# Patient Record
Sex: FEMALE | Race: WHITE | HISPANIC OR LATINO | Employment: FULL TIME | URBAN - METROPOLITAN AREA
[De-identification: names, ages, dates, MRNs, and addresses within clinical notes are randomized per-mention and may not be internally consistent; named-entity substitution may affect disease eponyms.]

---

## 2017-03-04 ENCOUNTER — APPOINTMENT (OUTPATIENT)
Dept: CT IMAGING | Facility: HOSPITAL | Age: 32
End: 2017-03-04

## 2017-03-04 ENCOUNTER — HOSPITAL ENCOUNTER (INPATIENT)
Facility: HOSPITAL | Age: 32
LOS: 4 days | Discharge: HOME | End: 2017-03-08
Attending: EMERGENCY MEDICINE | Admitting: FAMILY MEDICINE

## 2017-03-04 ENCOUNTER — APPOINTMENT (OUTPATIENT)
Dept: ULTRASOUND IMAGING | Facility: HOSPITAL | Age: 32
End: 2017-03-04

## 2017-03-04 DIAGNOSIS — R11.2 NON-INTRACTABLE VOMITING WITH NAUSEA, UNSPECIFIED VOMITING TYPE: ICD-10-CM

## 2017-03-04 DIAGNOSIS — N12 PYELONEPHRITIS: Primary | ICD-10-CM

## 2017-03-04 PROBLEM — D72.825 BANDEMIA: Status: ACTIVE | Noted: 2017-03-04

## 2017-03-04 PROBLEM — R10.9 ACUTE FLANK PAIN: Status: ACTIVE | Noted: 2017-03-04

## 2017-03-04 LAB
ALBUMIN SERPL-MCNC: 4.1 G/DL (ref 3.2–4.8)
ALBUMIN/GLOB SERPL: 1.5 G/DL (ref 1.5–2.5)
ALP SERPL-CCNC: 69 U/L (ref 25–100)
ALT SERPL W P-5'-P-CCNC: 19 U/L (ref 7–40)
ANION GAP SERPL CALCULATED.3IONS-SCNC: 5 MMOL/L (ref 3–11)
AST SERPL-CCNC: 15 U/L (ref 0–33)
B-HCG UR QL: NEGATIVE
BACTERIA UR QL AUTO: ABNORMAL /HPF
BASOPHILS # BLD AUTO: 0.01 10*3/MM3 (ref 0–0.2)
BASOPHILS NFR BLD AUTO: 0.1 % (ref 0–1)
BILIRUB SERPL-MCNC: 0.8 MG/DL (ref 0.3–1.2)
BILIRUB UR QL STRIP: ABNORMAL
BUN BLD-MCNC: 9 MG/DL (ref 9–23)
BUN/CREAT SERPL: 15 (ref 7–25)
CALCIUM SPEC-SCNC: 8.9 MG/DL (ref 8.7–10.4)
CHLORIDE SERPL-SCNC: 104 MMOL/L (ref 99–109)
CLARITY UR: CLEAR
CO2 SERPL-SCNC: 26 MMOL/L (ref 20–31)
COLOR UR: YELLOW
CREAT BLD-MCNC: 0.6 MG/DL (ref 0.6–1.3)
DEPRECATED RDW RBC AUTO: 42.3 FL (ref 37–54)
EOSINOPHIL # BLD AUTO: 0.03 10*3/MM3 (ref 0.1–0.3)
EOSINOPHIL NFR BLD AUTO: 0.3 % (ref 0–3)
ERYTHROCYTE [DISTWIDTH] IN BLOOD BY AUTOMATED COUNT: 12.5 % (ref 11.3–14.5)
GFR SERPL CREATININE-BSD FRML MDRD: 117 ML/MIN/1.73
GLOBULIN UR ELPH-MCNC: 2.7 GM/DL
GLUCOSE BLD-MCNC: 120 MG/DL (ref 70–100)
GLUCOSE UR STRIP-MCNC: NEGATIVE MG/DL
HCT VFR BLD AUTO: 36.5 % (ref 34.5–44)
HGB BLD-MCNC: 12.3 G/DL (ref 11.5–15.5)
HGB UR QL STRIP.AUTO: ABNORMAL
HOLD SPECIMEN: NORMAL
HOLD SPECIMEN: NORMAL
HYALINE CASTS UR QL AUTO: ABNORMAL /LPF
IMM GRANULOCYTES # BLD: 0.02 10*3/MM3 (ref 0–0.03)
IMM GRANULOCYTES NFR BLD: 0.2 % (ref 0–0.6)
INTERNAL NEGATIVE CONTROL: NORMAL
INTERNAL POSITIVE CONTROL: POSITIVE
KETONES UR QL STRIP: ABNORMAL
LEUKOCYTE ESTERASE UR QL STRIP.AUTO: NEGATIVE
LIPASE SERPL-CCNC: 22 U/L (ref 6–51)
LYMPHOCYTES # BLD AUTO: 1.9 10*3/MM3 (ref 0.6–4.8)
LYMPHOCYTES NFR BLD AUTO: 17.6 % (ref 24–44)
Lab: NORMAL
MCH RBC QN AUTO: 31.1 PG (ref 27–31)
MCHC RBC AUTO-ENTMCNC: 33.7 G/DL (ref 32–36)
MCV RBC AUTO: 92.2 FL (ref 80–99)
MONOCYTES # BLD AUTO: 0.64 10*3/MM3 (ref 0–1)
MONOCYTES NFR BLD AUTO: 5.9 % (ref 0–12)
NEUTROPHILS # BLD AUTO: 8.22 10*3/MM3 (ref 1.5–8.3)
NEUTROPHILS NFR BLD AUTO: 75.9 % (ref 41–71)
NITRITE UR QL STRIP: NEGATIVE
PH UR STRIP.AUTO: 6.5 [PH] (ref 5–8)
PLATELET # BLD AUTO: 171 10*3/MM3 (ref 150–450)
PMV BLD AUTO: 10.9 FL (ref 6–12)
POTASSIUM BLD-SCNC: 3.4 MMOL/L (ref 3.5–5.5)
PROT SERPL-MCNC: 6.8 G/DL (ref 5.7–8.2)
PROT UR QL STRIP: ABNORMAL
RBC # BLD AUTO: 3.96 10*6/MM3 (ref 3.89–5.14)
RBC # UR: ABNORMAL /HPF
REF LAB TEST METHOD: ABNORMAL
SODIUM BLD-SCNC: 135 MMOL/L (ref 132–146)
SP GR UR STRIP: 1.02 (ref 1–1.03)
SQUAMOUS #/AREA URNS HPF: ABNORMAL /HPF
UROBILINOGEN UR QL STRIP: ABNORMAL
WBC NRBC COR # BLD: 10.82 10*3/MM3 (ref 3.5–10.8)
WBC UR QL AUTO: ABNORMAL /HPF
WHOLE BLOOD HOLD SPECIMEN: NORMAL
WHOLE BLOOD HOLD SPECIMEN: NORMAL

## 2017-03-04 PROCEDURE — 83690 ASSAY OF LIPASE: CPT | Performed by: EMERGENCY MEDICINE

## 2017-03-04 PROCEDURE — 87077 CULTURE AEROBIC IDENTIFY: CPT | Performed by: EMERGENCY MEDICINE

## 2017-03-04 PROCEDURE — 87086 URINE CULTURE/COLONY COUNT: CPT | Performed by: EMERGENCY MEDICINE

## 2017-03-04 PROCEDURE — 81001 URINALYSIS AUTO W/SCOPE: CPT | Performed by: EMERGENCY MEDICINE

## 2017-03-04 PROCEDURE — 87186 SC STD MICRODIL/AGAR DIL: CPT | Performed by: EMERGENCY MEDICINE

## 2017-03-04 PROCEDURE — G0378 HOSPITAL OBSERVATION PER HR: HCPCS

## 2017-03-04 PROCEDURE — 76705 ECHO EXAM OF ABDOMEN: CPT

## 2017-03-04 PROCEDURE — 80053 COMPREHEN METABOLIC PANEL: CPT | Performed by: EMERGENCY MEDICINE

## 2017-03-04 PROCEDURE — 25010000003 CEFTRIAXONE PER 250 MG: Performed by: PHYSICIAN ASSISTANT

## 2017-03-04 PROCEDURE — 96375 TX/PRO/DX INJ NEW DRUG ADDON: CPT

## 2017-03-04 PROCEDURE — 99285 EMERGENCY DEPT VISIT HI MDM: CPT

## 2017-03-04 PROCEDURE — 96376 TX/PRO/DX INJ SAME DRUG ADON: CPT

## 2017-03-04 PROCEDURE — 96365 THER/PROPH/DIAG IV INF INIT: CPT

## 2017-03-04 PROCEDURE — 85025 COMPLETE CBC W/AUTO DIFF WBC: CPT | Performed by: EMERGENCY MEDICINE

## 2017-03-04 PROCEDURE — 96361 HYDRATE IV INFUSION ADD-ON: CPT

## 2017-03-04 PROCEDURE — 25010000002 ONDANSETRON PER 1 MG: Performed by: EMERGENCY MEDICINE

## 2017-03-04 PROCEDURE — 25010000002 HYDROMORPHONE PER 4 MG: Performed by: EMERGENCY MEDICINE

## 2017-03-04 PROCEDURE — 99223 1ST HOSP IP/OBS HIGH 75: CPT | Performed by: FAMILY MEDICINE

## 2017-03-04 PROCEDURE — 25010000002 MORPHINE PER 10 MG: Performed by: EMERGENCY MEDICINE

## 2017-03-04 PROCEDURE — 74176 CT ABD & PELVIS W/O CONTRAST: CPT

## 2017-03-04 PROCEDURE — 25010000002 KETOROLAC TROMETHAMINE PER 15 MG: Performed by: EMERGENCY MEDICINE

## 2017-03-04 RX ORDER — ACETAMINOPHEN 325 MG/1
650 TABLET ORAL EVERY 4 HOURS PRN
Status: DISCONTINUED | OUTPATIENT
Start: 2017-03-04 | End: 2017-03-08 | Stop reason: HOSPADM

## 2017-03-04 RX ORDER — SODIUM CHLORIDE AND POTASSIUM CHLORIDE 150; 900 MG/100ML; MG/100ML
125 INJECTION, SOLUTION INTRAVENOUS CONTINUOUS
Status: DISCONTINUED | OUTPATIENT
Start: 2017-03-05 | End: 2017-03-08 | Stop reason: HOSPADM

## 2017-03-04 RX ORDER — LORAZEPAM 2 MG/ML
0.5 INJECTION INTRAMUSCULAR EVERY 6 HOURS PRN
Status: DISCONTINUED | OUTPATIENT
Start: 2017-03-04 | End: 2017-03-08 | Stop reason: HOSPADM

## 2017-03-04 RX ORDER — SODIUM CHLORIDE 0.9 % (FLUSH) 0.9 %
10 SYRINGE (ML) INJECTION AS NEEDED
Status: DISCONTINUED | OUTPATIENT
Start: 2017-03-04 | End: 2017-03-08 | Stop reason: HOSPADM

## 2017-03-04 RX ORDER — ONDANSETRON 4 MG/1
4 TABLET, FILM COATED ORAL EVERY 8 HOURS PRN
COMMUNITY
End: 2017-05-25

## 2017-03-04 RX ORDER — HYDROMORPHONE HYDROCHLORIDE 1 MG/ML
0.5 INJECTION, SOLUTION INTRAMUSCULAR; INTRAVENOUS; SUBCUTANEOUS
Status: DISCONTINUED | OUTPATIENT
Start: 2017-03-04 | End: 2017-03-05

## 2017-03-04 RX ORDER — HYDROCODONE BITARTRATE AND ACETAMINOPHEN 5; 325 MG/1; MG/1
1 TABLET ORAL EVERY 6 HOURS PRN
Status: ON HOLD | COMMUNITY
End: 2017-03-08

## 2017-03-04 RX ORDER — NALOXONE HCL 0.4 MG/ML
0.4 VIAL (ML) INJECTION
Status: DISCONTINUED | OUTPATIENT
Start: 2017-03-04 | End: 2017-03-08 | Stop reason: HOSPADM

## 2017-03-04 RX ORDER — KETOROLAC TROMETHAMINE 30 MG/ML
30 INJECTION, SOLUTION INTRAMUSCULAR; INTRAVENOUS ONCE
Status: COMPLETED | OUTPATIENT
Start: 2017-03-04 | End: 2017-03-04

## 2017-03-04 RX ORDER — CEFTRIAXONE SODIUM 1 G/50ML
1 INJECTION, SOLUTION INTRAVENOUS EVERY 24 HOURS
Status: DISCONTINUED | OUTPATIENT
Start: 2017-03-05 | End: 2017-03-07

## 2017-03-04 RX ORDER — MORPHINE SULFATE 4 MG/ML
4 INJECTION, SOLUTION INTRAMUSCULAR; INTRAVENOUS ONCE
Status: COMPLETED | OUTPATIENT
Start: 2017-03-04 | End: 2017-03-04

## 2017-03-04 RX ORDER — CEFTRIAXONE SODIUM 1 G/50ML
1 INJECTION, SOLUTION INTRAVENOUS ONCE
Status: COMPLETED | OUTPATIENT
Start: 2017-03-04 | End: 2017-03-04

## 2017-03-04 RX ORDER — SODIUM CHLORIDE 0.9 % (FLUSH) 0.9 %
1-10 SYRINGE (ML) INJECTION AS NEEDED
Status: DISCONTINUED | OUTPATIENT
Start: 2017-03-04 | End: 2017-03-08 | Stop reason: HOSPADM

## 2017-03-04 RX ORDER — ONDANSETRON 2 MG/ML
4 INJECTION INTRAMUSCULAR; INTRAVENOUS EVERY 6 HOURS PRN
Status: DISCONTINUED | OUTPATIENT
Start: 2017-03-04 | End: 2017-03-08 | Stop reason: HOSPADM

## 2017-03-04 RX ORDER — PANTOPRAZOLE SODIUM 40 MG/10ML
40 INJECTION, POWDER, LYOPHILIZED, FOR SOLUTION INTRAVENOUS ONCE
Status: COMPLETED | OUTPATIENT
Start: 2017-03-04 | End: 2017-03-04

## 2017-03-04 RX ORDER — IBUPROFEN 400 MG/1
800 TABLET ORAL ONCE
Status: COMPLETED | OUTPATIENT
Start: 2017-03-04 | End: 2017-03-04

## 2017-03-04 RX ORDER — ONDANSETRON 2 MG/ML
4 INJECTION INTRAMUSCULAR; INTRAVENOUS ONCE
Status: COMPLETED | OUTPATIENT
Start: 2017-03-04 | End: 2017-03-04

## 2017-03-04 RX ORDER — HYDROCODONE BITARTRATE AND ACETAMINOPHEN 5; 325 MG/1; MG/1
1 TABLET ORAL EVERY 4 HOURS PRN
Status: DISCONTINUED | OUTPATIENT
Start: 2017-03-04 | End: 2017-03-07

## 2017-03-04 RX ADMIN — IBUPROFEN 800 MG: 400 TABLET ORAL at 21:19

## 2017-03-04 RX ADMIN — PANTOPRAZOLE SODIUM 40 MG: 40 INJECTION, POWDER, FOR SOLUTION INTRAVENOUS at 16:26

## 2017-03-04 RX ADMIN — MORPHINE SULFATE 4 MG: 4 INJECTION, SOLUTION INTRAMUSCULAR; INTRAVENOUS at 16:30

## 2017-03-04 RX ADMIN — SODIUM CHLORIDE 1000 ML: 9 INJECTION, SOLUTION INTRAVENOUS at 18:30

## 2017-03-04 RX ADMIN — SODIUM CHLORIDE 1000 ML: 9 INJECTION, SOLUTION INTRAVENOUS at 16:25

## 2017-03-04 RX ADMIN — KETOROLAC TROMETHAMINE 30 MG: 30 INJECTION, SOLUTION INTRAMUSCULAR at 20:46

## 2017-03-04 RX ADMIN — CEFTRIAXONE SODIUM 1 G: 1 INJECTION, SOLUTION INTRAVENOUS at 17:50

## 2017-03-04 RX ADMIN — MORPHINE SULFATE 4 MG: 4 INJECTION, SOLUTION INTRAMUSCULAR; INTRAVENOUS at 20:44

## 2017-03-04 RX ADMIN — HYDROMORPHONE HYDROCHLORIDE 1 MG: 1 INJECTION, SOLUTION INTRAMUSCULAR; INTRAVENOUS; SUBCUTANEOUS at 18:26

## 2017-03-04 RX ADMIN — ONDANSETRON 4 MG: 2 INJECTION INTRAMUSCULAR; INTRAVENOUS at 16:28

## 2017-03-05 PROBLEM — E87.6 HYPOKALEMIA: Status: ACTIVE | Noted: 2017-03-05

## 2017-03-05 PROBLEM — D72.825 BANDEMIA: Status: RESOLVED | Noted: 2017-03-04 | Resolved: 2017-03-05

## 2017-03-05 PROBLEM — N13.30 HYDRONEPHROSIS, LEFT: Status: ACTIVE | Noted: 2017-03-05

## 2017-03-05 LAB
ANION GAP SERPL CALCULATED.3IONS-SCNC: 2 MMOL/L (ref 3–11)
BASOPHILS # BLD AUTO: 0 10*3/MM3 (ref 0–0.2)
BASOPHILS NFR BLD AUTO: 0 % (ref 0–1)
BUN BLD-MCNC: 7 MG/DL (ref 9–23)
BUN/CREAT SERPL: 14 (ref 7–25)
CALCIUM SPEC-SCNC: 7.9 MG/DL (ref 8.7–10.4)
CHLORIDE SERPL-SCNC: 110 MMOL/L (ref 99–109)
CO2 SERPL-SCNC: 27 MMOL/L (ref 20–31)
CREAT BLD-MCNC: 0.5 MG/DL (ref 0.6–1.3)
DEPRECATED RDW RBC AUTO: 45.3 FL (ref 37–54)
EOSINOPHIL # BLD AUTO: 0.1 10*3/MM3 (ref 0.1–0.3)
EOSINOPHIL NFR BLD AUTO: 1.4 % (ref 0–3)
ERYTHROCYTE [DISTWIDTH] IN BLOOD BY AUTOMATED COUNT: 13 % (ref 11.3–14.5)
GFR SERPL CREATININE-BSD FRML MDRD: 145 ML/MIN/1.73
GLUCOSE BLD-MCNC: 85 MG/DL (ref 70–100)
HCT VFR BLD AUTO: 31.7 % (ref 34.5–44)
HGB BLD-MCNC: 10.3 G/DL (ref 11.5–15.5)
IMM GRANULOCYTES # BLD: 0.01 10*3/MM3 (ref 0–0.03)
IMM GRANULOCYTES NFR BLD: 0.1 % (ref 0–0.6)
LYMPHOCYTES # BLD AUTO: 2.63 10*3/MM3 (ref 0.6–4.8)
LYMPHOCYTES NFR BLD AUTO: 36.8 % (ref 24–44)
MCH RBC QN AUTO: 30.9 PG (ref 27–31)
MCHC RBC AUTO-ENTMCNC: 32.5 G/DL (ref 32–36)
MCV RBC AUTO: 95.2 FL (ref 80–99)
MONOCYTES # BLD AUTO: 0.66 10*3/MM3 (ref 0–1)
MONOCYTES NFR BLD AUTO: 9.2 % (ref 0–12)
NEUTROPHILS # BLD AUTO: 3.75 10*3/MM3 (ref 1.5–8.3)
NEUTROPHILS NFR BLD AUTO: 52.5 % (ref 41–71)
PLATELET # BLD AUTO: 160 10*3/MM3 (ref 150–450)
PMV BLD AUTO: 11.2 FL (ref 6–12)
POTASSIUM BLD-SCNC: 4 MMOL/L (ref 3.5–5.5)
RBC # BLD AUTO: 3.33 10*6/MM3 (ref 3.89–5.14)
SODIUM BLD-SCNC: 139 MMOL/L (ref 132–146)
WBC NRBC COR # BLD: 7.15 10*3/MM3 (ref 3.5–10.8)

## 2017-03-05 PROCEDURE — 80048 BASIC METABOLIC PNL TOTAL CA: CPT | Performed by: FAMILY MEDICINE

## 2017-03-05 PROCEDURE — 25010000002 ONDANSETRON PER 1 MG: Performed by: FAMILY MEDICINE

## 2017-03-05 PROCEDURE — 85025 COMPLETE CBC W/AUTO DIFF WBC: CPT | Performed by: FAMILY MEDICINE

## 2017-03-05 PROCEDURE — G0378 HOSPITAL OBSERVATION PER HR: HCPCS

## 2017-03-05 PROCEDURE — 25010000002 MORPHINE PER 10 MG: Performed by: INTERNAL MEDICINE

## 2017-03-05 PROCEDURE — 25010000003 CEFTRIAXONE PER 250 MG: Performed by: FAMILY MEDICINE

## 2017-03-05 PROCEDURE — 99233 SBSQ HOSP IP/OBS HIGH 50: CPT | Performed by: INTERNAL MEDICINE

## 2017-03-05 PROCEDURE — 25010000002 ENOXAPARIN PER 10 MG: Performed by: FAMILY MEDICINE

## 2017-03-05 PROCEDURE — 25810000003 SODIUM CHLORIDE 0.9 % WITH KCL 20 MEQ 20-0.9 MEQ/L-% SOLUTION: Performed by: FAMILY MEDICINE

## 2017-03-05 PROCEDURE — 25010000002 HYDROMORPHONE PER 4 MG: Performed by: FAMILY MEDICINE

## 2017-03-05 PROCEDURE — 25010000002 KETOROLAC TROMETHAMINE PER 15 MG: Performed by: INTERNAL MEDICINE

## 2017-03-05 RX ORDER — KETOROLAC TROMETHAMINE 30 MG/ML
30 INJECTION, SOLUTION INTRAMUSCULAR; INTRAVENOUS EVERY 6 HOURS PRN
Status: DISPENSED | OUTPATIENT
Start: 2017-03-05 | End: 2017-03-06

## 2017-03-05 RX ORDER — MORPHINE SULFATE 4 MG/ML
4 INJECTION, SOLUTION INTRAMUSCULAR; INTRAVENOUS EVERY 4 HOURS PRN
Status: DISCONTINUED | OUTPATIENT
Start: 2017-03-05 | End: 2017-03-07

## 2017-03-05 RX ADMIN — HYDROMORPHONE HYDROCHLORIDE 0.5 MG: 1 INJECTION, SOLUTION INTRAMUSCULAR; INTRAVENOUS; SUBCUTANEOUS at 05:34

## 2017-03-05 RX ADMIN — CEFTRIAXONE SODIUM 1 G: 1 INJECTION, SOLUTION INTRAVENOUS at 17:11

## 2017-03-05 RX ADMIN — MORPHINE SULFATE 4 MG: 4 INJECTION, SOLUTION INTRAMUSCULAR; INTRAVENOUS at 13:07

## 2017-03-05 RX ADMIN — HYDROCODONE BITARTRATE AND ACETAMINOPHEN 1 TABLET: 5; 325 TABLET ORAL at 12:08

## 2017-03-05 RX ADMIN — ACETAMINOPHEN 650 MG: 325 TABLET, FILM COATED ORAL at 00:32

## 2017-03-05 RX ADMIN — KETOROLAC TROMETHAMINE 30 MG: 30 INJECTION, SOLUTION INTRAMUSCULAR at 23:21

## 2017-03-05 RX ADMIN — MORPHINE SULFATE 4 MG: 4 INJECTION, SOLUTION INTRAMUSCULAR; INTRAVENOUS at 21:57

## 2017-03-05 RX ADMIN — KETOROLAC TROMETHAMINE 30 MG: 30 INJECTION, SOLUTION INTRAMUSCULAR at 10:02

## 2017-03-05 RX ADMIN — MORPHINE SULFATE 4 MG: 4 INJECTION, SOLUTION INTRAMUSCULAR; INTRAVENOUS at 09:00

## 2017-03-05 RX ADMIN — ONDANSETRON 4 MG: 2 INJECTION INTRAMUSCULAR; INTRAVENOUS at 08:56

## 2017-03-05 RX ADMIN — MORPHINE SULFATE 4 MG: 4 INJECTION, SOLUTION INTRAMUSCULAR; INTRAVENOUS at 17:11

## 2017-03-05 RX ADMIN — POTASSIUM CHLORIDE AND SODIUM CHLORIDE 125 ML/HR: 900; 150 INJECTION, SOLUTION INTRAVENOUS at 00:41

## 2017-03-05 RX ADMIN — ENOXAPARIN SODIUM 40 MG: 40 INJECTION SUBCUTANEOUS at 08:57

## 2017-03-05 RX ADMIN — HYDROCODONE BITARTRATE AND ACETAMINOPHEN 1 TABLET: 5; 325 TABLET ORAL at 16:05

## 2017-03-05 RX ADMIN — POTASSIUM CHLORIDE AND SODIUM CHLORIDE 125 ML/HR: 900; 150 INJECTION, SOLUTION INTRAVENOUS at 09:01

## 2017-03-05 RX ADMIN — KETOROLAC TROMETHAMINE 30 MG: 30 INJECTION, SOLUTION INTRAMUSCULAR at 16:01

## 2017-03-05 RX ADMIN — ACETAMINOPHEN 650 MG: 325 TABLET, FILM COATED ORAL at 21:57

## 2017-03-05 RX ADMIN — HYDROCODONE BITARTRATE AND ACETAMINOPHEN 1 TABLET: 5; 325 TABLET ORAL at 20:24

## 2017-03-05 RX ADMIN — HYDROMORPHONE HYDROCHLORIDE 0.5 MG: 1 INJECTION, SOLUTION INTRAMUSCULAR; INTRAVENOUS; SUBCUTANEOUS at 00:32

## 2017-03-05 NOTE — PLAN OF CARE
Problem: Patient Care Overview (Adult)  Goal: Plan of Care Review  Outcome: Ongoing (interventions implemented as appropriate)    03/05/17 0429   Coping/Psychosocial Response Interventions   Plan Of Care Reviewed With patient   Patient Care Overview   Progress no change   Outcome Evaluation   Outcome Summary/Follow up Plan pt admitted to unit, vss, pain controlled       Goal: Adult Individualization and Mutuality  Outcome: Ongoing (interventions implemented as appropriate)  Goal: Discharge Needs Assessment  Outcome: Ongoing (interventions implemented as appropriate)    Problem: Pain, Acute (Adult)  Goal: Identify Related Risk Factors and Signs and Symptoms  Outcome: Ongoing (interventions implemented as appropriate)  Goal: Acceptable Pain Control/Comfort Level  Outcome: Ongoing (interventions implemented as appropriate)

## 2017-03-05 NOTE — H&P
"    [facility] Medicine Services  HISTORY AND PHYSICAL    Primary Care Physician: Paolo Bhandari MD    Subjective     Chief Complaint:  Fever and Flank Pain    History of Present Illness:  This is a pleasant 31 year old  female that is accompanied by her  to [facility] ED for fever and flank pain.  She describes a visit at [facility] ED yesterday for similar symptoms.  She reports a few days of lower abdominal pain that radiates to her flank with associated dysuria, frequency and urgency.  She describes associated fever, chills, nausea and vomiting.  She has not identified gross hematuria, pelvic pain, vaginal discharge or rashes.  She describes a history of problems with \"UTI's.\"    Review of Systems   Constitutional: Positive for activity change, chills, fatigue and fever. Negative for diaphoresis.   HENT: Negative.  Negative for nosebleeds and trouble swallowing.    Eyes: Negative.  Negative for discharge and visual disturbance.   Respiratory: Negative.  Negative for shortness of breath.    Cardiovascular: Negative.  Negative for chest pain, palpitations and leg swelling.   Gastrointestinal: Positive for abdominal pain, nausea and vomiting. Negative for blood in stool, constipation and diarrhea.   Endocrine: Negative.  Negative for polydipsia, polyphagia and polyuria.   Genitourinary: Positive for difficulty urinating, dysuria, flank pain, frequency and urgency. Negative for hematuria, pelvic pain and vaginal discharge.   Musculoskeletal: Positive for myalgias.   Skin: Negative.  Negative for rash.   Allergic/Immunologic: Negative.    Neurological: Negative.  Negative for syncope.   Hematological: Negative.  Does not bruise/bleed easily.   Psychiatric/Behavioral: Negative.    Otherwise complete ROS performed and negative except as mentioned in the HPI.    Past Medical History   Diagnosis Date   • Kidney stones    • Obesity      Past Surgical History   Procedure Laterality Date   • " "Appendectomy     • Tubal abdominal ligation     • Tonsillectomy       Family History   Problem Relation Age of Onset   • No Known Problems Mother    • Hypertension Father      Social History   • Marital status:      Occupational History   •  facility      Social History Main Topics   • Smoking status: Former Smoker     Years: 10.00     Types: Cigarettes     Quit date: 2016   • Smokeless tobacco: Never Used   • Alcohol use No   • Drug use: No   • Sexual activity: Yes     Partners: Male     Birth control/ protection: Surgical      Comment:      Medications:  Prescriptions Prior to Admission   Medication Sig Dispense Refill Last Dose   • HYDROcodone-acetaminophen (NORCO) 5-325 MG per tablet Take 1 tablet by mouth Every 6 (Six) Hours As Needed.      • ondansetron (ZOFRAN) 4 MG tablet Take 4 mg by mouth Every 8 (Eight) Hours As Needed for nausea or vomiting.      • amoxicillin-clavulanate (AUGMENTIN) 875-125 MG per tablet Take 1 tablet by mouth 2 (two) times a day. 14 tablet 0    • Chlorcyclizine-Pseudoephed 25-60 MG tablet 1/2-1 po q 8 hours PRN 20 tablet 0    • predniSONE (DELTASONE) 20 MG tablet Take 2 tablets by mouth daily. 10 tablet 0        Allergies:  No Known Allergies    Objective     Physical Exam:  Vital Signs:   Visit Vitals   • /72 (BP Location: Right arm, Patient Position: Lying)   • Pulse 89   • Temp 98.9 °F (37.2 °C) (Oral)   • Resp 18   • Ht 64\" (162.6 cm)   • Wt 200 lb (90.7 kg)   • LMP 03/01/2017   • SpO2 97%   • Breastfeeding No   • BMI 34.33 kg/m2     Physical Exam   Constitutional: She is oriented to person, place, and time. She appears well-developed and well-nourished. She is cooperative.   HENT:   Head: Normocephalic and atraumatic.   Right Ear: Hearing and external ear normal.   Left Ear: Hearing and external ear normal.   Nose: Nose normal.   Mouth/Throat: Uvula is midline, oropharynx is clear and moist and mucous membranes are normal.   Eyes: Conjunctivae and EOM " are normal. Pupils are equal, round, and reactive to light. No scleral icterus.   Neck: Trachea normal and normal range of motion. Neck supple. No JVD present. Carotid bruit is not present. No thyromegaly present.   Cardiovascular: Normal rate, regular rhythm, normal heart sounds and intact distal pulses.    Pulmonary/Chest: Effort normal and breath sounds normal.   Abdominal: Soft. Bowel sounds are normal. There is no hepatosplenomegaly. There is tenderness in the periumbilical area. There is CVA tenderness. There is no rebound and no guarding.   Musculoskeletal: Normal range of motion.   Lymphadenopathy:     She has no cervical adenopathy.   Neurological: She is alert and oriented to person, place, and time. She has normal strength and normal reflexes. No sensory deficit.   Skin: Skin is warm and dry.   Psychiatric: She has a normal mood and affect. Her speech is normal and behavior is normal. Judgment and thought content normal. Cognition and memory are normal.   Nursing note and vitals reviewed.    Results Reviewed:    Results from last 7 days  Lab Units 03/04/17  1524   WBC 10*3/mm3 10.82*   HEMOGLOBIN g/dL 12.3   PLATELETS 10*3/mm3 171       Results from last 7 days  Lab Units 03/04/17  1524   SODIUM mmol/L 135   POTASSIUM mmol/L 3.4*   TOTAL CO2 mmol/L 26.0   CREATININE mg/dL 0.60   GLUCOSE mg/dL 120*   CALCIUM mg/dL 8.9     I have personally reviewed and interpreted available lab data, radiology studies and ECG obtained at time of admission.     Assessment / Plan     Assessment/Problem List:   Principal Problem:    Pyelonephritis  Active Problems:    Acute flank pain    Bandemia    Kidney stones    Plan:  We will admit as observation and continue with IVF's.  Urine cultures have been sent.  We will continue with Rocephin and await culture results.  We will continue with anti-emetics, pain relief and other comfort measures.  The plan has been discussed with the patient and her  who is at the  bedside.    DVT prophylaxis: Lovenox  Code Status: Full  Admission Status: Patient will be admitted to [facility].     Mann Emerson MD 03/04/17 11:19 PM

## 2017-03-05 NOTE — PLAN OF CARE
Problem: Patient Care Overview (Adult)  Goal: Plan of Care Review  Outcome: Ongoing (interventions implemented as appropriate)  Goal: Adult Individualization and Mutuality  Outcome: Ongoing (interventions implemented as appropriate)  Goal: Discharge Needs Assessment  Outcome: Ongoing (interventions implemented as appropriate)    Problem: Pain, Acute (Adult)  Goal: Identify Related Risk Factors and Signs and Symptoms  Outcome: Ongoing (interventions implemented as appropriate)  Goal: Acceptable Pain Control/Comfort Level  Outcome: Ongoing (interventions implemented as appropriate)    Problem: Infection, Risk/Actual (Adult)  Goal: Identify Related Risk Factors and Signs and Symptoms  Outcome: Ongoing (interventions implemented as appropriate)  Goal: Infection Prevention/Resolution  Outcome: Ongoing (interventions implemented as appropriate)

## 2017-03-05 NOTE — PROGRESS NOTES
"    Psychiatric Medicine Services  INPATIENT PROGRESS NOTE    Date of Admission: 3/4/2017  Length of Stay: 0  Primary Care Physician: Paolo Bhandari MD    Subjective   CC: right flank pain    HPI:  Continues to have significant right flank pain.  Wants to change to morphine from dilaudid as that seems to be making her HA worse.  Per , did have temp 101.8 yesterday morning.  Had not yet taken cipro prescribed on Friday.  + \"strong smelling urine and dark\".  Reported negative flu swab as well.    Review Of Systems:   Review of Systems   Constitutional: Positive for fever.   Respiratory: Negative for cough.    Cardiovascular: Negative for chest pain.   Gastrointestinal: Positive for abdominal pain.   Genitourinary: Positive for dysuria.   Neurological: Positive for headaches.   All other systems reviewed and are negative.        Objective      Temp:  [97.3 °F (36.3 °C)-100.1 °F (37.8 °C)] 97.7 °F (36.5 °C)  Heart Rate:  [] 65  Resp:  [16-18] 18  BP: ()/(52-72) 103/59  Physical Exam   Constitutional: She is oriented to person, place, and time. She appears well-developed and well-nourished.   Lying in bed with wet washcloth on forehead   Eyes: Pupils are equal, round, and reactive to light.   Cardiovascular: Normal rate, regular rhythm and normal heart sounds.    No murmur heard.  Pulmonary/Chest: Effort normal and breath sounds normal. No respiratory distress.   Abdominal:   Mild right sided abd pain.  Moderate right CVA tenderness   Musculoskeletal: She exhibits no edema.   Neurological: She is alert and oriented to person, place, and time.   Skin: Skin is warm and dry. No rash noted.   Psychiatric: She has a normal mood and affect.   Vitals reviewed.      Results Review:    I have reviewed the labs, radiology results and diagnostic studies.      Results from last 7 days  Lab Units 03/05/17  0447   WBC 10*3/mm3 7.15   HEMOGLOBIN g/dL 10.3*   PLATELETS 10*3/mm3 160       Results " from last 7 days  Lab Units 03/05/17  0447   SODIUM mmol/L 139   POTASSIUM mmol/L 4.0   CHLORIDE mmol/L 110*   TOTAL CO2 mmol/L 27.0   BUN mg/dL 7*   CREATININE mg/dL 0.50*   GLUCOSE mg/dL 85   CALCIUM mg/dL 7.9*       Culture Data:     URINE CULTURE   Date Value Ref Range Status   03/04/2017 Culture in progress  Preliminary       Radiology Data:   Abd CT - mild left hydro and perinephric stranding    I have reviewed the medications.    Assessment/Plan     Problem List  Principal Problem:    Pyelonephritis  Active Problems:    Acute flank pain    Kidney stones    Hydronephrosis, left    Hypokalemia    Assessment/Plan:  - has persistent right flank pain.  Interestingly, abnormalities on the CT are on the left side.  No evidence of stone or obstruction on right.  - Will continue rocephin, await urine culture results.  - will change narcs to morphine and also continue toradol which she feels is hoping the most.  I reviewed [facility] and she has no controlled substances prescribed in last year.  - flu negative at OSH  - continue IVF, K+ better  - home TBD based on symptoms, could be tomorrow is she is improved.    High complexity.    Wander Aguilar MD   03/05/17   10:03 AM    Please note that portions of this note may have been completed with a voice recognition program. Efforts were made to edit the dictations, but occasionally words are mistranscribed.

## 2017-03-05 NOTE — ED PROVIDER NOTES
Subjective   Patient is a 31 y.o. female presenting with abdominal pain.   History provided by:  Patient and spouse  Abdominal Pain   Pain location:  R flank, RUQ and RLQ  Pain quality: pressure and shooting    Progression:  Worsening (Initially began as lower back pain on Thursday. Pt thought UTI and dx at [facility] but didn't  Rx. Pt then seen at [facility] ED yesterday due to worsening pain going into abdomen. )  Context: not alcohol use, not diet changes, not recent travel, not sick contacts and not suspicious food intake    Context comment:  Pt states was told at [facility] labs, urine and CT were normal. She states they told her she didn't need abx and needed to f/u with her PCP to check her gallbladder.    Exacerbated by: Pain in abdomen and right lower back worse with movement, especially right leg.   Associated symptoms: chills, dysuria, fatigue, fever (101), nausea and vomiting    Associated symptoms: no chest pain, no constipation, no cough, no diarrhea, no hematuria, no shortness of breath, no sore throat and no vaginal bleeding    Risk factors: no alcohol abuse and no NSAID use    No numbness, tingling or weakness to LE. Pt states occasionally left hip with go numb if she lays on it for too long but none currently. No bowel or bladder dysfunction.       Review of Systems   Constitutional: Positive for chills, fatigue and fever (101).   HENT: Negative for congestion, ear pain, sore throat and trouble swallowing.    Eyes: Negative for pain, redness and visual disturbance.   Respiratory: Negative for cough, chest tightness and shortness of breath.    Cardiovascular: Negative for chest pain and leg swelling.   Gastrointestinal: Positive for abdominal pain, nausea and vomiting. Negative for constipation and diarrhea.   Genitourinary: Positive for dysuria, flank pain and frequency. Negative for difficulty urinating, hematuria and vaginal bleeding.   Musculoskeletal: Negative for arthralgias, back pain and  joint swelling.   Skin: Negative for rash and wound.   Neurological: Negative for dizziness, syncope, speech difficulty, weakness, numbness and headaches.   Psychiatric/Behavioral: Negative for confusion.   All other systems reviewed and are negative.      History reviewed. No pertinent past medical history.    No Known Allergies    Past Surgical History   Procedure Laterality Date   • Appendectomy     • Tubal abdominal ligation     • Tonsillectomy         Family History   Problem Relation Age of Onset   • No Known Problems Mother    • Hypertension Father        Social History     Social History   • Marital status:      Spouse name: N/A   • Number of children: N/A   • Years of education: N/A     Occupational History   •  facility      Social History Main Topics   • Smoking status: Former Smoker     Quit date: 2016   • Smokeless tobacco: None   • Alcohol use No   • Drug use: No   • Sexual activity: Yes     Partners: Male     Birth control/ protection: None      Comment:      Other Topics Concern   • None     Social History Narrative   • None           Objective   Physical Exam   Constitutional: She is oriented to person, place, and time. Vital signs are normal. She appears well-developed.   HENT:   Head: Atraumatic.   Nose: Nose normal.   Mouth/Throat: Mucous membranes are normal.   Eyes: Conjunctivae, EOM and lids are normal. Pupils are equal, round, and reactive to light.   Neck: Normal range of motion. Neck supple.   Cardiovascular: Regular rhythm and normal heart sounds.  Tachycardia present.    Pulmonary/Chest: Effort normal and breath sounds normal. She has no wheezes.   Abdominal: Soft. She exhibits no distension. There is generalized tenderness (Right greater than left ). There is CVA tenderness (Bilateral, R>L ). There is no rebound and no guarding.   Musculoskeletal: Normal range of motion. She exhibits no edema or tenderness.   Neurological: She is alert and oriented to person,  place, and time. She has normal strength. No sensory deficit.   Reflex Scores:       Patellar reflexes are 2+ on the right side and 2+ on the left side.  Skin: Skin is warm and dry. No rash noted. No erythema.   Psychiatric: She has a normal mood and affect. Her speech is normal and behavior is normal.   Nursing note and vitals reviewed.      Procedures         ED Course  ED Course   Re-examined several times in ED. Pt initially felt slightly better after the Morphine but then pain returned and requested more. Pt states the Dilaudid did not help as much as the Morphine. Discussed results and watched in ED for awhile. She explains she does not feel like she can go home with the recurrent pain in her lower back and abdomen and feeling ill. Her mother and  are agreeable with the plan.     Discussed patient and admission with Dr. Thomas who is agreeable.     Discussed admission with Dr. Emerson.        Recent Results (from the past 24 hour(s))   Comprehensive Metabolic Panel    Collection Time: 03/04/17  3:24 PM   Result Value Ref Range    Glucose 120 (H) 70 - 100 mg/dL    BUN 9 9 - 23 mg/dL    Creatinine 0.60 0.60 - 1.30 mg/dL    Sodium 135 132 - 146 mmol/L    Potassium 3.4 (L) 3.5 - 5.5 mmol/L    Chloride 104 99 - 109 mmol/L    CO2 26.0 20.0 - 31.0 mmol/L    Calcium 8.9 8.7 - 10.4 mg/dL    Total Protein 6.8 5.7 - 8.2 g/dL    Albumin 4.10 3.20 - 4.80 g/dL    ALT (SGPT) 19 7 - 40 U/L    AST (SGOT) 15 0 - 33 U/L    Alkaline Phosphatase 69 25 - 100 U/L    Total Bilirubin 0.8 0.3 - 1.2 mg/dL    eGFR Non African Amer 117 >60 mL/min/1.73    Globulin 2.7 gm/dL    A/G Ratio 1.5 1.5 - 2.5 g/dL    BUN/Creatinine Ratio 15.0 7.0 - 25.0    Anion Gap 5.0 3.0 - 11.0 mmol/L   Lipase    Collection Time: 03/04/17  3:24 PM   Result Value Ref Range    Lipase 22 6 - 51 U/L   Light Blue Top    Collection Time: 03/04/17  3:24 PM   Result Value Ref Range    Extra Tube hold for add-on    Green Top (Gel)    Collection Time: 03/04/17   3:24 PM   Result Value Ref Range    Extra Tube Hold for add-ons.    Lavender Top    Collection Time: 03/04/17  3:24 PM   Result Value Ref Range    Extra Tube hold for add-on    Gold Top - SST    Collection Time: 03/04/17  3:24 PM   Result Value Ref Range    Extra Tube Hold for add-ons.    CBC Auto Differential    Collection Time: 03/04/17  3:24 PM   Result Value Ref Range    WBC 10.82 (H) 3.50 - 10.80 10*3/mm3    RBC 3.96 3.89 - 5.14 10*6/mm3    Hemoglobin 12.3 11.5 - 15.5 g/dL    Hematocrit 36.5 34.5 - 44.0 %    MCV 92.2 80.0 - 99.0 fL    MCH 31.1 (H) 27.0 - 31.0 pg    MCHC 33.7 32.0 - 36.0 g/dL    RDW 12.5 11.3 - 14.5 %    RDW-SD 42.3 37.0 - 54.0 fl    MPV 10.9 6.0 - 12.0 fL    Platelets 171 150 - 450 10*3/mm3    Neutrophil % 75.9 (H) 41.0 - 71.0 %    Lymphocyte % 17.6 (L) 24.0 - 44.0 %    Monocyte % 5.9 0.0 - 12.0 %    Eosinophil % 0.3 0.0 - 3.0 %    Basophil % 0.1 0.0 - 1.0 %    Immature Grans % 0.2 0.0 - 0.6 %    Neutrophils, Absolute 8.22 1.50 - 8.30 10*3/mm3    Lymphocytes, Absolute 1.90 0.60 - 4.80 10*3/mm3    Monocytes, Absolute 0.64 0.00 - 1.00 10*3/mm3    Eosinophils, Absolute 0.03 (L) 0.10 - 0.30 10*3/mm3    Basophils, Absolute 0.01 0.00 - 0.20 10*3/mm3    Immature Grans, Absolute 0.02 0.00 - 0.03 10*3/mm3   Urinalysis With / Culture If Indicated    Collection Time: 03/04/17  3:56 PM   Result Value Ref Range    Color, UA Yellow Yellow, Straw    Appearance, UA Clear Clear    pH, UA 6.5 5.0 - 8.0    Specific Gravity, UA 1.025 1.005 - 1.030    Glucose, UA Negative Negative    Ketones, UA >=80 mg/dL (3+) (A) Negative    Bilirubin, UA Small (1+) (A) Negative    Blood, UA Small (1+) (A) Negative    Protein, UA Trace (A) Negative    Leuk Esterase, UA Negative Negative    Nitrite, UA Negative Negative    Urobilinogen, UA 1.0 E.U./dL 0.2 - 1.0 E.U./dL   Urinalysis, Microscopic Only    Collection Time: 03/04/17  3:56 PM   Result Value Ref Range    RBC, UA 7-12 (A) None Seen, 0-2 /HPF    WBC, UA 6-12 (A) None Seen  "/HPF    Bacteria, UA 4+ (A) None Seen, Trace /HPF    Squamous Epithelial Cells, UA 7-12 (A) None Seen, 0-2 /HPF    Hyaline Casts, UA 7-12 0 - 6 /LPF    Methodology Automated Microscopy    POCT pregnancy, urine    Collection Time: 03/04/17  4:03 PM   Result Value Ref Range    HCG, Urine, QL Negative Negative    Lot Number [id number]     Internal Positive Control Positive     Internal Negative Control NA      Note: In addition to lab results from this visit, the labs listed above may include labs taken at another facility or during a different encounter within the last 24 hours. Please correlate lab times with ED admission and discharge times for further clarification of the services performed during this visit.    CT Abdomen Pelvis Without Contrast   Final Result   Abnormal      1.  Minimal left hydroureteronephrosis, with minimal left perinephric and    periureteral fat stranding, without obstructive etiology identified.  Findings    may be secondary to recently passed calculus. Concomitant urinary tract    infection is possible.      2.  Incidental/non-acute findings are described above.         THIS DOCUMENT HAS BEEN ELECTRONICALLY SIGNED BY GURJIT BLAS MD      US Gallbladder   Preliminary Result   Possible small nonobstructing stones within the kidney. The   remainder of the right upper quadrant ultrasound is unremarkable.        DICTATED:     03/04/2017   EDITED:         03/04/2017            Vitals:    03/04/17 1504 03/04/17 1634 03/04/17 2048   BP: 106/59 112/66 103/70   BP Location: Left arm Right arm Right arm   Patient Position: Sitting Lying Lying   Pulse: 106 90 91   Resp: 16 18 18   Temp: 98.7 °F (37.1 °C)  100.1 °F (37.8 °C)   TempSrc: Oral     SpO2: 99% 99% 96%   Weight: 200 lb (90.7 kg)     Height: 64\" (162.6 cm)       Medications   sodium chloride 0.9 % flush 10 mL (not administered)   sodium chloride 0.9 % bolus 1,000 mL (0 mL Intravenous Stopped 3/4/17 1649)   Morphine sulfate (PF) injection 4 mg " (4 mg Intravenous Given 3/4/17 1630)   ondansetron (ZOFRAN) injection 4 mg (4 mg Intravenous Given 3/4/17 1628)   pantoprazole (PROTONIX) injection 40 mg (40 mg Intravenous Given 3/4/17 1626)   cefTRIAXone (ROCEPHIN) IVPB 1 g (0 g Intravenous Stopped 3/4/17 1820)   HYDROmorphone (DILAUDID) injection 1 mg (1 mg Intravenous Given 3/4/17 1826)   sodium chloride 0.9 % bolus 1,000 mL (1,000 mL Intravenous New Bag 3/4/17 1830)   ketorolac (TORADOL) injection 30 mg (30 mg Intravenous Given 3/4/17 2046)   Morphine sulfate (PF) injection 4 mg (4 mg Intravenous Given 3/4/17 2044)   ibuprofen (ADVIL,MOTRIN) tablet 800 mg (800 mg Oral Given 3/4/17 2119)                         MDM    Final diagnoses:   Pyelonephritis   Non-intractable vomiting with nausea, unspecified vomiting type            RAUDEL Trevizo  03/04/17 3538

## 2017-03-06 PROCEDURE — 25010000003 CEFTRIAXONE PER 250 MG: Performed by: FAMILY MEDICINE

## 2017-03-06 PROCEDURE — 25010000002 KETOROLAC TROMETHAMINE PER 15 MG: Performed by: INTERNAL MEDICINE

## 2017-03-06 PROCEDURE — 99233 SBSQ HOSP IP/OBS HIGH 50: CPT | Performed by: NURSE PRACTITIONER

## 2017-03-06 PROCEDURE — G0378 HOSPITAL OBSERVATION PER HR: HCPCS

## 2017-03-06 PROCEDURE — 25010000002 ENOXAPARIN PER 10 MG: Performed by: FAMILY MEDICINE

## 2017-03-06 PROCEDURE — 25810000003 SODIUM CHLORIDE 0.9 % WITH KCL 20 MEQ 20-0.9 MEQ/L-% SOLUTION: Performed by: INTERNAL MEDICINE

## 2017-03-06 PROCEDURE — 25010000002 MORPHINE PER 10 MG: Performed by: INTERNAL MEDICINE

## 2017-03-06 PROCEDURE — 25810000003 SODIUM CHLORIDE 0.9 % WITH KCL 20 MEQ 20-0.9 MEQ/L-% SOLUTION: Performed by: PHYSICIAN ASSISTANT

## 2017-03-06 PROCEDURE — 25010000002 ONDANSETRON PER 1 MG: Performed by: FAMILY MEDICINE

## 2017-03-06 RX ORDER — CALCIUM CARBONATE 200(500)MG
2 TABLET,CHEWABLE ORAL ONCE
Status: COMPLETED | OUTPATIENT
Start: 2017-03-06 | End: 2017-03-06

## 2017-03-06 RX ADMIN — KETOROLAC TROMETHAMINE 30 MG: 30 INJECTION, SOLUTION INTRAMUSCULAR at 08:00

## 2017-03-06 RX ADMIN — Medication 2 TABLET: at 01:12

## 2017-03-06 RX ADMIN — HYDROCODONE BITARTRATE AND ACETAMINOPHEN 1 TABLET: 5; 325 TABLET ORAL at 22:57

## 2017-03-06 RX ADMIN — MORPHINE SULFATE 4 MG: 4 INJECTION, SOLUTION INTRAMUSCULAR; INTRAVENOUS at 16:26

## 2017-03-06 RX ADMIN — ONDANSETRON 4 MG: 2 INJECTION INTRAMUSCULAR; INTRAVENOUS at 18:27

## 2017-03-06 RX ADMIN — CEFTRIAXONE SODIUM 1 G: 1 INJECTION, SOLUTION INTRAVENOUS at 17:34

## 2017-03-06 RX ADMIN — SODIUM CHLORIDE 500 ML: 9 INJECTION, SOLUTION INTRAVENOUS at 10:52

## 2017-03-06 RX ADMIN — MORPHINE SULFATE 4 MG: 4 INJECTION, SOLUTION INTRAMUSCULAR; INTRAVENOUS at 12:01

## 2017-03-06 RX ADMIN — HYDROCODONE BITARTRATE AND ACETAMINOPHEN 1 TABLET: 5; 325 TABLET ORAL at 13:51

## 2017-03-06 RX ADMIN — POTASSIUM CHLORIDE AND SODIUM CHLORIDE 125 ML/HR: 900; 150 INJECTION, SOLUTION INTRAVENOUS at 22:59

## 2017-03-06 RX ADMIN — POTASSIUM CHLORIDE AND SODIUM CHLORIDE 125 ML/HR: 900; 150 INJECTION, SOLUTION INTRAVENOUS at 15:09

## 2017-03-06 RX ADMIN — MORPHINE SULFATE 4 MG: 4 INJECTION, SOLUTION INTRAMUSCULAR; INTRAVENOUS at 02:18

## 2017-03-06 RX ADMIN — MORPHINE SULFATE 4 MG: 4 INJECTION, SOLUTION INTRAMUSCULAR; INTRAVENOUS at 20:06

## 2017-03-06 RX ADMIN — ENOXAPARIN SODIUM 40 MG: 40 INJECTION SUBCUTANEOUS at 08:00

## 2017-03-06 RX ADMIN — POTASSIUM CHLORIDE AND SODIUM CHLORIDE 100 ML/HR: 900; 150 INJECTION, SOLUTION INTRAVENOUS at 04:41

## 2017-03-06 RX ADMIN — MORPHINE SULFATE 4 MG: 4 INJECTION, SOLUTION INTRAMUSCULAR; INTRAVENOUS at 08:00

## 2017-03-06 RX ADMIN — HYDROCODONE BITARTRATE AND ACETAMINOPHEN 1 TABLET: 5; 325 TABLET ORAL at 01:16

## 2017-03-06 RX ADMIN — HYDROCODONE BITARTRATE AND ACETAMINOPHEN 1 TABLET: 5; 325 TABLET ORAL at 06:14

## 2017-03-06 RX ADMIN — HYDROCODONE BITARTRATE AND ACETAMINOPHEN 1 TABLET: 5; 325 TABLET ORAL at 09:59

## 2017-03-06 NOTE — PROGRESS NOTES
Psychiatric Medicine Services  INPATIENT PROGRESS NOTE    Date of Admission: 3/4/2017  Length of Stay: 0  Primary Care Physician: Paolo Bhandari MD    Subjective   CC: right flank pain    HPI:  Patient continues to have fairly significant right flank pain that does improve with pain meds for a while.  Denies any fevers but she states that she broke out into a sweat last night.  Still having a significant amount of burning when she urinates.  Per nursing staff, she has had very little urine output in the past 24 hours.      Review Of Systems:   Review of Systems   Constitutional: Positive for fever.   Respiratory: Negative for cough.    Cardiovascular: Negative for chest pain.   Gastrointestinal: Positive for abdominal pain.   Genitourinary: Positive for dysuria.   Neurological: Positive for headaches.   All other systems reviewed and are negative.        Objective      Temp:  [98 °F (36.7 °C)-98.2 °F (36.8 °C)] 98 °F (36.7 °C)  Heart Rate:  [57-71] 57  Resp:  [12-18] 12  BP: ()/(54-63) 92/56  Physical Exam   Constitutional: She is oriented to person, place, and time. She appears well-developed and well-nourished.   Lying in bed on her right side with  at bedside.  Appears uncomfortable.   Eyes: Pupils are equal, round, and reactive to light.   Cardiovascular: Normal rate, regular rhythm and normal heart sounds.    No murmur heard.  Pulmonary/Chest: Effort normal and breath sounds normal. No respiratory distress.   Abdominal:   Mild right sided abd pain, specifically when she gets up to urinate.  Moderate right CVA tenderness   Musculoskeletal: She exhibits no edema.   Neurological: She is alert and oriented to person, place, and time.   Skin: Skin is warm and dry. No rash noted.   Psychiatric: She has a normal mood and affect.   Vitals reviewed.      Results Review:    I have reviewed the labs, radiology results and diagnostic studies.      Results from last 7 days  Lab Units  03/05/17  0447   WBC 10*3/mm3 7.15   HEMOGLOBIN g/dL 10.3*   PLATELETS 10*3/mm3 160       Results from last 7 days  Lab Units 03/05/17  0447   SODIUM mmol/L 139   POTASSIUM mmol/L 4.0   CHLORIDE mmol/L 110*   TOTAL CO2 mmol/L 27.0   BUN mg/dL 7*   CREATININE mg/dL 0.50*   GLUCOSE mg/dL 85   CALCIUM mg/dL 7.9*       Culture Data:     URINE CULTURE   Date Value Ref Range Status   03/04/2017 Culture in progress  Preliminary       Radiology Data:   Abd CT - mild left hydro and perinephric stranding    I have reviewed the medications.    Assessment/Plan     Problem List  Principal Problem:    Pyelonephritis  Active Problems:    Acute flank pain    Kidney stones    Hydronephrosis, left    Hypokalemia    Assessment/Plan:  - has persistent right flank pain. - Will continue rocephin, await urine culture results.  - continue morphine and toradol  -mildly hypotensive this am with some decreased urine output.  Will give a 500 ml fluid bolus and monitor.  -CBC, CMP in am  - flu negative at OSH  - continue IVF, K+ better  - home TBD based on symptoms    High complexity.    Kenisha Langford, APRN   03/06/17   11:22 AM    Please note that portions of this note may have been completed with a voice recognition program. Efforts were made to edit the dictations, but occasionally words are mistranscribed.

## 2017-03-06 NOTE — PROGRESS NOTES
Discharge Planning Assessment  [facility]     Patient Name: Marta Amaya  MRN: [id number]  Today's Date: 3/6/2017    Admit Date: 3/4/2017          Discharge Needs Assessment       03/06/17 1027    Living Environment    Lives With spouse;child(aroldo), dependent    Living Arrangements house   One level home    Provides Primary Care For child(aroldo)    Primary Care Provided By spouse/significant other    Quality Of Family Relationships supportive    Able to Return to Prior Living Arrangements yes    Discharge Needs Assessment    Concerns To Be Addressed adjustment to diagnosis/illness concerns;basic needs concerns    Readmission Within The Last 30 Days no previous admission in last 30 days    Anticipated Changes Related to Illness none    Equipment Currently Used at Home none    Equipment Needed After Discharge none    Transportation Available car    Discharge Disposition home or self-care    Discharge Contact Information if Applicable [phone]            Discharge Plan       03/06/17 1028    Case Management/Social Work Plan    Plan Home    Patient/Family In Agreement With Plan yes    Additional Comments anticipates discharge to home. Will follow for any discharge planning needs.         Discharge Placement     No information found                Demographic Summary       03/06/17 1025    Referral Information    Admission Type observation    Referral Source admission list    Record Reviewed history and physical    Contact Information    Permission Granted to Share Information With     Primary Care Physician Information    Name Paolo Bhandari            Functional Status       03/06/17 1025    Functional Status Current    Ambulation 0-->independent    Transferring 0-->independent    Toileting 0-->independent    Bathing 0-->independent    Dressing 0-->independent    Eating 0-->independent    Communication 0-->understands/communicates without difficulty    Change in Functional Status Since Onset of Current  Illness/Injury no    Functional Status Prior    Ambulation 0-->independent    Transferring 0-->independent    Toileting 0-->independent    Bathing 0-->independent    Dressing 0-->independent    Eating 0-->independent    Communication 0-->understands/communicates without difficulty    IADL    Medications independent    Meal Preparation independent   Shared household responsibilities with     Housekeeping independent    Laundry independent    Shopping independent    Oral Care independent    Activity Tolerance    Current Activity Limitations none    Usual Activity Tolerance excellent    Current Activity Tolerance excellent    Employment/Financial    Employment/Finance Comments Tells me she has [employer] primary. [employer] was suppose to have been cancelled.             Psychosocial     None            Abuse/Neglect     None            Legal     None            Substance Abuse     None            Patient Forms     None          Rochelle Baumann, RN

## 2017-03-06 NOTE — PLAN OF CARE
Problem: Patient Care Overview (Adult)  Goal: Plan of Care Review  Outcome: Ongoing (interventions implemented as appropriate)    03/06/17 0522 03/06/17 1743   Coping/Psychosocial Response Interventions   Plan Of Care Reviewed With patient --    Patient Care Overview   Progress no change --    Outcome Evaluation   Outcome Summary/Follow up Plan --  VSS. Pain better controlled than yesterday. Urine output remains low. MD notified. 500 ml NS bolus given. VSS. Denies further needs.        Goal: Adult Individualization and Mutuality  Outcome: Ongoing (interventions implemented as appropriate)  Goal: Discharge Needs Assessment  Outcome: Ongoing (interventions implemented as appropriate)    Problem: Pain, Acute (Adult)  Goal: Identify Related Risk Factors and Signs and Symptoms  Outcome: Ongoing (interventions implemented as appropriate)  Goal: Acceptable Pain Control/Comfort Level  Outcome: Ongoing (interventions implemented as appropriate)    Problem: Infection, Risk/Actual (Adult)  Goal: Identify Related Risk Factors and Signs and Symptoms  Outcome: Ongoing (interventions implemented as appropriate)  Goal: Infection Prevention/Resolution  Outcome: Ongoing (interventions implemented as appropriate)

## 2017-03-06 NOTE — NURSING NOTE
Patient complained of feeling like she couldn't get a deep breath, and being nauseated. RR WNL at 16. VSS. Oxygen saturation 99% on room air. Zofran IV given for nausea. Patient encouraged to ambulate and/or be up in the chair to help with potential gas pain. Encouraged to notify nursing staff for any changes in symptomology.

## 2017-03-06 NOTE — PLAN OF CARE
Problem: Patient Care Overview (Adult)  Goal: Plan of Care Review  Outcome: Ongoing (interventions implemented as appropriate)    03/06/17 0522   Coping/Psychosocial Response Interventions   Plan Of Care Reviewed With patient   Patient Care Overview   Progress no change   Outcome Evaluation   Outcome Summary/Follow up Plan vss, pt required frequent prn medications for pain control, pt tolerating reg diet.       Goal: Adult Individualization and Mutuality  Outcome: Ongoing (interventions implemented as appropriate)  Goal: Discharge Needs Assessment  Outcome: Ongoing (interventions implemented as appropriate)    Problem: Pain, Acute (Adult)  Goal: Identify Related Risk Factors and Signs and Symptoms  Outcome: Ongoing (interventions implemented as appropriate)  Goal: Acceptable Pain Control/Comfort Level  Outcome: Ongoing (interventions implemented as appropriate)    Problem: Infection, Risk/Actual (Adult)  Goal: Identify Related Risk Factors and Signs and Symptoms  Outcome: Ongoing (interventions implemented as appropriate)  Goal: Infection Prevention/Resolution  Outcome: Ongoing (interventions implemented as appropriate)

## 2017-03-07 LAB
ALBUMIN SERPL-MCNC: 3.2 G/DL (ref 3.2–4.8)
ALBUMIN/GLOB SERPL: 1.8 G/DL (ref 1.5–2.5)
ALP SERPL-CCNC: 92 U/L (ref 25–100)
ALT SERPL W P-5'-P-CCNC: 150 U/L (ref 7–40)
ANION GAP SERPL CALCULATED.3IONS-SCNC: 3 MMOL/L (ref 3–11)
AST SERPL-CCNC: 81 U/L (ref 0–33)
BACTERIA SPEC AEROBE CULT: ABNORMAL
BASOPHILS # BLD AUTO: 0.01 10*3/MM3 (ref 0–0.2)
BASOPHILS NFR BLD AUTO: 0.2 % (ref 0–1)
BILIRUB SERPL-MCNC: 0.3 MG/DL (ref 0.3–1.2)
BUN BLD-MCNC: 11 MG/DL (ref 9–23)
BUN/CREAT SERPL: 18.3 (ref 7–25)
CALCIUM SPEC-SCNC: 8.3 MG/DL (ref 8.7–10.4)
CHLORIDE SERPL-SCNC: 109 MMOL/L (ref 99–109)
CO2 SERPL-SCNC: 27 MMOL/L (ref 20–31)
CREAT BLD-MCNC: 0.6 MG/DL (ref 0.6–1.3)
DEPRECATED RDW RBC AUTO: 43.1 FL (ref 37–54)
EOSINOPHIL # BLD AUTO: 0.08 10*3/MM3 (ref 0.1–0.3)
EOSINOPHIL NFR BLD AUTO: 1.5 % (ref 0–3)
ERYTHROCYTE [DISTWIDTH] IN BLOOD BY AUTOMATED COUNT: 12.5 % (ref 11.3–14.5)
GFR SERPL CREATININE-BSD FRML MDRD: 117 ML/MIN/1.73
GLOBULIN UR ELPH-MCNC: 1.8 GM/DL
GLUCOSE BLD-MCNC: 92 MG/DL (ref 70–100)
HCT VFR BLD AUTO: 34.2 % (ref 34.5–44)
HGB BLD-MCNC: 11.2 G/DL (ref 11.5–15.5)
IMM GRANULOCYTES # BLD: 0 10*3/MM3 (ref 0–0.03)
IMM GRANULOCYTES NFR BLD: 0 % (ref 0–0.6)
LYMPHOCYTES # BLD AUTO: 1.49 10*3/MM3 (ref 0.6–4.8)
LYMPHOCYTES NFR BLD AUTO: 28.1 % (ref 24–44)
MCH RBC QN AUTO: 30.9 PG (ref 27–31)
MCHC RBC AUTO-ENTMCNC: 32.7 G/DL (ref 32–36)
MCV RBC AUTO: 94.5 FL (ref 80–99)
MONOCYTES # BLD AUTO: 0.37 10*3/MM3 (ref 0–1)
MONOCYTES NFR BLD AUTO: 7 % (ref 0–12)
NEUTROPHILS # BLD AUTO: 3.36 10*3/MM3 (ref 1.5–8.3)
NEUTROPHILS NFR BLD AUTO: 63.2 % (ref 41–71)
PLATELET # BLD AUTO: 177 10*3/MM3 (ref 150–450)
PMV BLD AUTO: 11.3 FL (ref 6–12)
POTASSIUM BLD-SCNC: 4.6 MMOL/L (ref 3.5–5.5)
PROT SERPL-MCNC: 5 G/DL (ref 5.7–8.2)
RBC # BLD AUTO: 3.62 10*6/MM3 (ref 3.89–5.14)
SODIUM BLD-SCNC: 139 MMOL/L (ref 132–146)
WBC NRBC COR # BLD: 5.31 10*3/MM3 (ref 3.5–10.8)

## 2017-03-07 PROCEDURE — 80053 COMPREHEN METABOLIC PANEL: CPT | Performed by: NURSE PRACTITIONER

## 2017-03-07 PROCEDURE — 25010000002 LEVOFLOXACIN PER 250 MG: Performed by: NURSE PRACTITIONER

## 2017-03-07 PROCEDURE — 25010000002 ONDANSETRON PER 1 MG: Performed by: FAMILY MEDICINE

## 2017-03-07 PROCEDURE — 25810000003 SODIUM CHLORIDE 0.9 % WITH KCL 20 MEQ 20-0.9 MEQ/L-% SOLUTION: Performed by: PHYSICIAN ASSISTANT

## 2017-03-07 PROCEDURE — 25010000002 ENOXAPARIN PER 10 MG: Performed by: FAMILY MEDICINE

## 2017-03-07 PROCEDURE — 85025 COMPLETE CBC W/AUTO DIFF WBC: CPT | Performed by: NURSE PRACTITIONER

## 2017-03-07 PROCEDURE — 25010000002 LORAZEPAM PER 2 MG: Performed by: FAMILY MEDICINE

## 2017-03-07 PROCEDURE — 25010000002 MORPHINE PER 10 MG: Performed by: INTERNAL MEDICINE

## 2017-03-07 PROCEDURE — 25010000002 KETOROLAC TROMETHAMINE PER 15 MG: Performed by: NURSE PRACTITIONER

## 2017-03-07 PROCEDURE — 99232 SBSQ HOSP IP/OBS MODERATE 35: CPT | Performed by: NURSE PRACTITIONER

## 2017-03-07 RX ORDER — KETOROLAC TROMETHAMINE 30 MG/ML
30 INJECTION, SOLUTION INTRAMUSCULAR; INTRAVENOUS EVERY 6 HOURS PRN
Status: DISCONTINUED | OUTPATIENT
Start: 2017-03-07 | End: 2017-03-08 | Stop reason: HOSPADM

## 2017-03-07 RX ORDER — KETOROLAC TROMETHAMINE 30 MG/ML
30 INJECTION, SOLUTION INTRAMUSCULAR; INTRAVENOUS ONCE
Status: DISCONTINUED | OUTPATIENT
Start: 2017-03-07 | End: 2017-03-07

## 2017-03-07 RX ORDER — KETOROLAC TROMETHAMINE 30 MG/ML
30 INJECTION, SOLUTION INTRAMUSCULAR; INTRAVENOUS EVERY 6 HOURS PRN
Status: DISCONTINUED | OUTPATIENT
Start: 2017-03-07 | End: 2017-03-07

## 2017-03-07 RX ORDER — ACETAMINOPHEN, ASPIRIN AND CAFFEINE 250; 250; 65 MG/1; MG/1; MG/1
2 TABLET, FILM COATED ORAL EVERY 6 HOURS PRN
Status: DISCONTINUED | OUTPATIENT
Start: 2017-03-07 | End: 2017-03-08 | Stop reason: HOSPADM

## 2017-03-07 RX ORDER — LEVOFLOXACIN 5 MG/ML
750 INJECTION, SOLUTION INTRAVENOUS EVERY 24 HOURS
Status: DISCONTINUED | OUTPATIENT
Start: 2017-03-07 | End: 2017-03-08 | Stop reason: HOSPADM

## 2017-03-07 RX ADMIN — POTASSIUM CHLORIDE AND SODIUM CHLORIDE 125 ML/HR: 900; 150 INJECTION, SOLUTION INTRAVENOUS at 14:55

## 2017-03-07 RX ADMIN — KETOROLAC TROMETHAMINE 30 MG: 30 INJECTION, SOLUTION INTRAMUSCULAR at 20:26

## 2017-03-07 RX ADMIN — LORAZEPAM 0.5 MG: 2 INJECTION, SOLUTION INTRAMUSCULAR; INTRAVENOUS at 01:29

## 2017-03-07 RX ADMIN — ACETAMINOPHEN 650 MG: 325 TABLET, FILM COATED ORAL at 01:29

## 2017-03-07 RX ADMIN — MORPHINE SULFATE 4 MG: 4 INJECTION, SOLUTION INTRAMUSCULAR; INTRAVENOUS at 00:25

## 2017-03-07 RX ADMIN — POTASSIUM CHLORIDE AND SODIUM CHLORIDE 125 ML/HR: 900; 150 INJECTION, SOLUTION INTRAVENOUS at 06:30

## 2017-03-07 RX ADMIN — HYDROCODONE BITARTRATE AND ACETAMINOPHEN 1 TABLET: 5; 325 TABLET ORAL at 08:38

## 2017-03-07 RX ADMIN — ACETAMINOPHEN, ASPIRIN AND CAFFEINE 2 TABLET: 250; 250; 65 TABLET, FILM COATED ORAL at 17:31

## 2017-03-07 RX ADMIN — MORPHINE SULFATE 4 MG: 4 INJECTION, SOLUTION INTRAMUSCULAR; INTRAVENOUS at 06:30

## 2017-03-07 RX ADMIN — MORPHINE SULFATE 4 MG: 4 INJECTION, SOLUTION INTRAMUSCULAR; INTRAVENOUS at 14:54

## 2017-03-07 RX ADMIN — ONDANSETRON 4 MG: 2 INJECTION INTRAMUSCULAR; INTRAVENOUS at 13:37

## 2017-03-07 RX ADMIN — LEVOFLOXACIN 750 MG: 750 INJECTION, SOLUTION INTRAVENOUS at 19:47

## 2017-03-07 RX ADMIN — ENOXAPARIN SODIUM 40 MG: 40 INJECTION SUBCUTANEOUS at 08:33

## 2017-03-07 RX ADMIN — MORPHINE SULFATE 4 MG: 4 INJECTION, SOLUTION INTRAMUSCULAR; INTRAVENOUS at 10:43

## 2017-03-07 NOTE — PAYOR COMM NOTE
"Marta Emery (31 y.o. Female) INITIAL NOTIFICATION AND CLINICALS.PT WAS OBS.NOW INPT.    Date of Birth Social Security Number Address Home Phone MRN    [id number]  [address] [place] [id number] [phone] [id number]    Mormon Marital Status          None        Admission Date Admission Type Admitting Provider Attending Provider Department, Room/Bed    3/4/17 Emergency Mann Emerson MD Tovar, True Lee MD [facility] 5B GYN, N550/1    Discharge Date Discharge Disposition Discharge Destination                      Attending Provider: True Montalvo MD     Allergies:  No Known Allergies    Isolation:  None   Infection:  None   Code Status:  FULL    Ht:  64\" (162.6 cm)   Wt:  201 lb 14.4 oz (91.6 kg)    Admission Cmt:  None   Principal Problem:  Pyelonephritis [N12]                 Active Insurance as of 3/4/2017     Primary Coverage     Payor Plan Insurance Group Employer/Plan Group    CIGNA MISC CIGNA [id number]     Coverage Address Coverage Phone Number Effective From Effective To    [address] [phone] 3/3/2017     [place] [id number]       Subscriber Name Subscriber Birth Date Member ID       ABBEY EMERY [id number]           Secondary Coverage     Payor Plan Insurance Group Employer/Plan Group    [facility] [place] [facility] [place]      Payor Plan Address Payor Plan Phone Number Effective From Effective To    [address] [address]  6/1/2016     [place] [address]       Subscriber Name Subscriber Birth Date Member ID       MARTA EMERY 1985 [id number]                 Emergency Contacts      (Rel.) Home Phone Work Phone Mobile Phone    Abbey Emery (Spouse) [phone] -- [phone]               History & Physical      Mann Emerson MD at 3/4/2017 11:19 PM              [facility] Medicine Services  HISTORY AND PHYSICAL    Primary Care Physician: Paolo Bhandari MD    Subjective     Chief " "Complaint:  Fever and Flank Pain    History of Present Illness:  This is a pleasant 31 year old  female that is accompanied by her  to [facility] ED for fever and flank pain.  She describes a visit at [place] ED yesterday for similar symptoms.  She reports a few days of lower abdominal pain that radiates to her flank with associated dysuria, frequency and urgency.  She describes associated fever, chills, nausea and vomiting.  She has not identified gross hematuria, pelvic pain, vaginal discharge or rashes.  She describes a history of problems with \"UTI's.\"    Review of Systems   Constitutional: Positive for activity change, chills, fatigue and fever. Negative for diaphoresis.   HENT: Negative.  Negative for nosebleeds and trouble swallowing.    Eyes: Negative.  Negative for discharge and visual disturbance.   Respiratory: Negative.  Negative for shortness of breath.    Cardiovascular: Negative.  Negative for chest pain, palpitations and leg swelling.   Gastrointestinal: Positive for abdominal pain, nausea and vomiting. Negative for blood in stool, constipation and diarrhea.   Endocrine: Negative.  Negative for polydipsia, polyphagia and polyuria.   Genitourinary: Positive for difficulty urinating, dysuria, flank pain, frequency and urgency. Negative for hematuria, pelvic pain and vaginal discharge.   Musculoskeletal: Positive for myalgias.   Skin: Negative.  Negative for rash.   Allergic/Immunologic: Negative.    Neurological: Negative.  Negative for syncope.   Hematological: Negative.  Does not bruise/bleed easily.   Psychiatric/Behavioral: Negative.    Otherwise complete ROS performed and negative except as mentioned in the HPI.    Past Medical History   Diagnosis Date   • Kidney stones    • Obesity      Past Surgical History   Procedure Laterality Date   • Appendectomy     • Tubal abdominal ligation     • Tonsillectomy       Family History   Problem Relation Age of Onset   • No Known Problems Mother  " "  • Hypertension Father      Social History   • Marital status:      Occupational History   •  facility      Social History Main Topics   • Smoking status: Former Smoker     Years: 10.00     Types: Cigarettes     Quit date: 2016   • Smokeless tobacco: Never Used   • Alcohol use No   • Drug use: No   • Sexual activity: Yes     Partners: Male     Birth control/ protection: Surgical      Comment:      Medications:  Prescriptions Prior to Admission   Medication Sig Dispense Refill Last Dose   • HYDROcodone-acetaminophen (NORCO) 5-325 MG per tablet Take 1 tablet by mouth Every 6 (Six) Hours As Needed.      • ondansetron (ZOFRAN) 4 MG tablet Take 4 mg by mouth Every 8 (Eight) Hours As Needed for nausea or vomiting.      • amoxicillin-clavulanate (AUGMENTIN) 875-125 MG per tablet Take 1 tablet by mouth 2 (two) times a day. 14 tablet 0    • Chlorcyclizine-Pseudoephed 25-60 MG tablet 1/2-1 po q 8 hours PRN 20 tablet 0    • predniSONE (DELTASONE) 20 MG tablet Take 2 tablets by mouth daily. 10 tablet 0        Allergies:  No Known Allergies    Objective     Physical Exam:  Vital Signs:   Visit Vitals   • /72 (BP Location: Right arm, Patient Position: Lying)   • Pulse 89   • Temp 98.9 °F (37.2 °C) (Oral)   • Resp 18   • Ht 64\" (162.6 cm)   • Wt 200 lb (90.7 kg)   • LMP 03/01/2017   • SpO2 97%   • Breastfeeding No   • BMI 34.33 kg/m2     Physical Exam   Constitutional: She is oriented to person, place, and time. She appears well-developed and well-nourished. She is cooperative.   HENT:   Head: Normocephalic and atraumatic.   Right Ear: Hearing and external ear normal.   Left Ear: Hearing and external ear normal.   Nose: Nose normal.   Mouth/Throat: Uvula is midline, oropharynx is clear and moist and mucous membranes are normal.   Eyes: Conjunctivae and EOM are normal. Pupils are equal, round, and reactive to light. No scleral icterus.   Neck: Trachea normal and normal range of motion. Neck supple. No " JVD present. Carotid bruit is not present. No thyromegaly present.   Cardiovascular: Normal rate, regular rhythm, normal heart sounds and intact distal pulses.    Pulmonary/Chest: Effort normal and breath sounds normal.   Abdominal: Soft. Bowel sounds are normal. There is no hepatosplenomegaly. There is tenderness in the periumbilical area. There is CVA tenderness. There is no rebound and no guarding.   Musculoskeletal: Normal range of motion.   Lymphadenopathy:     She has no cervical adenopathy.   Neurological: She is alert and oriented to person, place, and time. She has normal strength and normal reflexes. No sensory deficit.   Skin: Skin is warm and dry.   Psychiatric: She has a normal mood and affect. Her speech is normal and behavior is normal. Judgment and thought content normal. Cognition and memory are normal.   Nursing note and vitals reviewed.    Results Reviewed:    Results from last 7 days  Lab Units 03/04/17  1524   WBC 10*3/mm3 10.82*   HEMOGLOBIN g/dL 12.3   PLATELETS 10*3/mm3 171       Results from last 7 days  Lab Units 03/04/17  1524   SODIUM mmol/L 135   POTASSIUM mmol/L 3.4*   TOTAL CO2 mmol/L 26.0   CREATININE mg/dL 0.60   GLUCOSE mg/dL 120*   CALCIUM mg/dL 8.9     I have personally reviewed and interpreted available lab data, radiology studies and ECG obtained at time of admission.     Assessment / Plan     Assessment/Problem List:   Principal Problem:    Pyelonephritis  Active Problems:    Acute flank pain    Bandemia    Kidney stones    Plan:  We will admit as observation and continue with IVF's.  Urine cultures have been sent.  We will continue with Rocephin and await culture results.  We will continue with anti-emetics, pain relief and other comfort measures.  The plan has been discussed with the patient and her  who is at the bedside.    DVT prophylaxis: Lovenox  Code Status: Full  Admission Status: Patient [facility] be [facility] to [facility].     Mann Emerson MD 03/04/17 11:19  PM           Electronically signed by Mann Emerson MD at 3/4/2017 11:29 PM           Emergency Department Notes      RAUDEL Trevizo at 3/4/2017  7:33 PM     Attestation signed by Edmar Thomas MD at 3/7/2017  5:34 AM              For this patient encounter, I reviewed the NP or PA documentation, treatment plan, and medical decision making. Edmar Thomas MD 3/7/2017 5:34 AM                               Subjective   Patient is a 31 y.o. female presenting with abdominal pain.   History provided by:  Patient and spouse  Abdominal Pain   Pain location:  R flank, RUQ and RLQ  Pain quality: pressure and shooting    Progression:  Worsening (Initially began as lower back pain on Thursday. Pt thought UTI and dx at [facility] but didn't  Rx. Pt then seen at [facility] ED yesterday due to worsening pain going into abdomen. )  Context: not alcohol use, not diet changes, not recent travel, not sick contacts and not suspicious food intake    Context comment:  Pt states was told at [facility] labs, urine and CT were normal. She states they told her she didn't need abx and needed to f/u with her PCP to check her gallbladder.  by: Pain in abdomen and right lower back worse with movement, especially right leg.   Associated symptoms: chills, dysuria, fatigue, fever (101), nausea and vomiting    Associated symptoms: no chest pain, no constipation, no cough, no diarrhea, no hematuria, no shortness of breath, no sore throat and no vaginal bleeding    Risk factors: no alcohol abuse and no NSAID use    No numbness, tingling or weakness to LE. Pt states occasionally left hip with go numb if she lays on it for too long but none currently. No bowel or bladder dysfunction.       Review of Systems   Constitutional: Positive for chills, fatigue and fever (101).   HENT: Negative for congestion, ear pain, sore throat and trouble swallowing.    Eyes: Negative for pain, redness and visual disturbance.   Respiratory: Negative for cough,  chest tightness and shortness of breath.    Cardiovascular: Negative for chest pain and leg swelling.   Gastrointestinal: Positive for abdominal pain, nausea and vomiting. Negative for constipation and diarrhea.   Genitourinary: Positive for dysuria, flank pain and frequency. Negative for difficulty urinating, hematuria and vaginal bleeding.   Musculoskeletal: Negative for arthralgias, back pain and joint swelling.   Skin: Negative for rash and wound.   Neurological: Negative for dizziness, syncope, speech difficulty, weakness, numbness and headaches.   Psychiatric/Behavioral: Negative for confusion. other systems reviewed and are negative.      History reviewed. No pertinent past medical history.    No Known Allergies    Past Surgical History   Procedure Laterality Date   • Appendectomy     • Tubal abdominal ligation     • Tonsillectomy         Family History   Problem Relation Age of Onset   • No Known Problems Mother    • Hypertension Father        Social History     Social History   • Marital status:      Spouse name: N/A   • Number of children: N/A   • Years of education: N/A     Occupational History   •  facility      Social History Main Topics   • Smoking status: Former Smoker     Quit date: 2016   • Smokeless tobacco: None   • Alcohol use No   • Drug use: No   • Sexual activity: Yes     Partners: Male     Birth control/ protection: None      Comment:      Other Topics Concern   • None     Social History Narrative   • None           Objective   Physical Exam   Constitutional: She is oriented to person, place, and time. Vital signs are normal. She appears well-developed.   HENT:   Head: Atraumatic.   Nose: Nose normal.   Mouth/Throat: Mucous membranes are normal.   Eyes: Conjunctivae, EOM and lids are normal. Pupils are equal, round, and reactive to light.   Neck: Normal range of motion. Neck supple.   Cardiovascular: Regular rhythm and normal heart sounds.  Tachycardia present.     Pulmonary/Chest: Effort normal and breath sounds normal. She has no wheezes.   Abdominal: Soft. She exhibits no distension. There is generalized tenderness (Right greater than left ). There is CVA tenderness (Bilateral, R>L ). There is no rebound and no guarding.   Musculoskeletal: Normal range of motion. She exhibits no edema or tenderness.   Neurological: She is alert and oriented to person, place, and time. She has normal strength. No sensory deficit.   Reflex Scores:       Patellar reflexes are 2+ on the right side and 2+ on the left side.  Skin: Skin is warm and dry. No rash noted. No erythema.   Psychiatric: She has a normal mood and affect. Her speech is normal and behavior is normal. note and vitals reviewed.      Procedures        ED Course  ED Course   Re-examined several times in ED. Pt initially felt slightly better after the Morphine but then pain returned and requested more. Pt states the Dilaudid did not help as much as the Morphine. Discussed results and watched in ED for awhile. She explains she does not feel like she can go home with the recurrent pain in her lower back and abdomen and feeling ill. Her mother and  are agreeable with the plan.     Discussed patient and admission with Dr. Thomas who is agreeable.     Discussed admission with Dr. Emerson.        Recent Results (from the past 24 hour(s))   Comprehensive Metabolic Panel    Collection Time: 03/04/17  3:24 PM   Result Value Ref Range    Glucose 120 (H) 70 - 100 mg/dL    BUN 9 9 - 23 mg/dL    Creatinine 0.60 0.60 - 1.30 mg/dL    Sodium 135 132 - 146 mmol/L    Potassium 3.4 (L) 3.5 - 5.5 mmol/L    Chloride 104 99 - 109 mmol/L    CO2 26.0 20.0 - 31.0 mmol/L    Calcium 8.9 8.7 - 10.4 mg/dL    Total Protein 6.8 5.7 - 8.2 g/dL    Albumin 4.10 3.20 - 4.80 g/dL    ALT (SGPT) 19 7 - 40 U/L    AST (SGOT) 15 0 - 33 U/L    Alkaline Phosphatase 69 25 - 100 U/L    Total Bilirubin 0.8 0.3 - 1.2 mg/dL    eGFR Non  Amer 117 >60  mL/min/1.73    Globulin 2.7 gm/dL    A/G Ratio 1.5 1.5 - 2.5 g/dL    BUN/Creatinine Ratio 15.0 7.0 - 25.0    Anion Gap 5.0 3.0 - 11.0 mmol/L   Lipase    Collection Time: 03/04/17  3:24 PM   Result Value Ref Range    Lipase 22 6 - 51 U/L   Light Blue Top    Collection Time: 03/04/17  3:24 PM   Result Value Ref Range    Extra Tube hold for add-on    Green Top (Gel)    Collection Time: 03/04/17  3:24 PM   Result Value Ref Range    Extra Tube Hold for add-ons.    Lavender Top    Collection Time: 03/04/17  3:24 PM   Result Value Ref Range    Extra Tube hold for add-on    Gold Top - SST    Collection Time: 03/04/17  3:24 PM   Result Value Ref Range    Extra Tube Hold for add-ons.    CBC Auto Differential    Collection Time: 03/04/17  3:24 PM   Result Value Ref Range    WBC 10.82 (H) 3.50 - 10.80 10*3/mm3    RBC 3.96 3.89 - 5.14 10*6/mm3    Hemoglobin 12.3 11.5 - 15.5 g/dL    Hematocrit 36.5 34.5 - 44.0 %    MCV 92.2 80.0 - 99.0 fL    MCH 31.1 (H) 27.0 - 31.0 pg    MCHC 33.7 32.0 - 36.0 g/dL    RDW 12.5 11.3 - 14.5 %    RDW-SD 42.3 37.0 - 54.0 fl    MPV 10.9 6.0 - 12.0 fL    Platelets 171 150 - 450 10*3/mm3    Neutrophil % 75.9 (H) 41.0 - 71.0 %    Lymphocyte % 17.6 (L) 24.0 - 44.0 %    Monocyte % 5.9 0.0 - 12.0 %    Eosinophil % 0.3 0.0 - 3.0 %    Basophil % 0.1 0.0 - 1.0 %    Immature Grans % 0.2 0.0 - 0.6 %    Neutrophils, Absolute 8.22 1.50 - 8.30 10*3/mm3    Lymphocytes, Absolute 1.90 0.60 - 4.80 10*3/mm3    Monocytes, Absolute 0.64 0.00 - 1.00 10*3/mm3    Eosinophils, Absolute 0.03 (L) 0.10 - 0.30 10*3/mm3    Basophils, Absolute 0.01 0.00 - 0.20 10*3/mm3    Immature Grans, Absolute 0.02 0.00 - 0.03 10*3/mm3   Urinalysis With / Culture If Indicated    Collection Time: 03/04/17  3:56 PM   Result Value Ref Range    Color, UA Yellow Yellow, Straw    Appearance, UA Clear Clear    pH, UA 6.5 5.0 - 8.0    Specific Gravity, UA 1.025 1.005 - 1.030    Glucose, UA Negative Negative    Ketones, UA >=80 mg/dL (3+) (A) Negative     Bilirubin, UA Small (1+) (A) Negative    Blood, UA Small (1+) (A) Negative    Protein, UA Trace (A) Negative    Leuk Esterase, UA Negative Negative    Nitrite, UA Negative Negative    Urobilinogen, UA 1.0 E.U./dL 0.2 - 1.0 E.U./dL   Urinalysis, Microscopic Only    Collection Time: 03/04/17  3:56 PM   Result Value Ref Range    RBC, UA 7-12 (A) None Seen, 0-2 /HPF    WBC, UA 6-12 (A) None Seen /HPF    Bacteria, UA 4+ (A) None Seen, Trace /HPF    Squamous Epithelial Cells, UA 7-12 (A) None Seen, 0-2 /HPF    Hyaline Casts, UA 7-12 0 - 6 /LPF    Methodology Automated Microscopy    POCT pregnancy, urine    Collection Time: 03/04/17  4:03 PM   Result Value Ref Range    HCG, Urine, QL Negative Negative    Lot Number [id number]     Internal Positive Control Positive     Internal Negative Control NA      Note: In addition to lab results from this visit, the labs listed above may include labs taken at another facility or during a different encounter within the last 24 hours. Please correlate lab times with ED admission and discharge times for further clarification of the services performed during this visit.    CT Abdomen Pelvis Without Contrast   Final Result   Abnormal      1.  Minimal left hydroureteronephrosis, with minimal left perinephric and    periureteral fat stranding, without obstructive etiology identified.  Findings    may be secondary to recently passed calculus. Concomitant urinary tract    infection is possible.      2.  Incidental/non-acute findings are described above.         THIS DOCUMENT HAS BEEN ELECTRONICALLY SIGNED BY GURJIT BLAS MD      US Gallbladder   Preliminary Result   Possible small nonobstructing stones within the kidney. The   remainder of the right upper quadrant ultrasound is unremarkable.        DICTATED:     03/04/2017   EDITED:         03/04/2017            Vitals:    03/04/17 1504 03/04/17 1634 03/04/17 2048   BP: 106/59 112/66 103/70   BP Location: Left arm Right arm Right arm   Patient  "Position: Sitting Lying Lying   Pulse: 106 90 91   Resp: 16 18 18   Temp: 98.7 °F (37.1 °C)  100.1 °F (37.8 °C)   TempSrc: Oral     SpO2: 99% 99% 96%   Weight: 200 lb (90.7 kg)     Height: 64\" (162.6 cm)       Medications   sodium chloride 0.9 % flush 10 mL (not administered)   sodium chloride 0.9 % bolus 1,000 mL (0 mL Intravenous Stopped 3/4/17 1749)   Morphine sulfate (PF) injection 4 mg (4 mg Intravenous Given 3/4/17 1630)   ondansetron (ZOFRAN) injection 4 mg (4 mg Intravenous Given 3/4/17 1628)   pantoprazole (PROTONIX) injection 40 mg (40 mg Intravenous Given 3/4/17 1626)   cefTRIAXone (ROCEPHIN) IVPB 1 g (0 g Intravenous Stopped 3/4/17 1820)   HYDROmorphone (DILAUDID) injection 1 mg (1 mg Intravenous Given 3/4/17 1826)   sodium chloride 0.9 % bolus 1,000 mL (1,000 mL Intravenous New Bag 3/4/17 1830)   ketorolac (TORADOL) injection 30 mg (30 mg Intravenous Given 3/4/17 2046)   Morphine sulfate (PF) injection 4 mg (4 mg Intravenous Given 3/4/17 2044)   ibuprofen (ADVIL,MOTRIN) tablet 800 mg (800 mg Oral Given 3/4/17 2119)                         MDM    Final diagnoses:   Pyelonephritis   Non-intractable vomiting with nausea, unspecified vomiting type           RAUDEL Trevizo  03/04/17 2089       Electronically signed by Edmar Thomas MD at 3/7/2017  5:34 AM        Hospital Medications (active)       Dose Frequency Start End    acetaminophen (TYLENOL) tablet 650 mg 650 mg Every 4 Hours PRN 3/4/2017     Sig - Route: Take 2 tablets by mouth Every 4 (Four) Hours As Needed for mild pain (1-3). - Oral    cefTRIAXone (ROCEPHIN) IVPB 1 g 1 g Every 24 Hours 3/5/2017     Sig - Route: Infuse 50 mL into a venous catheter Daily. - Intravenous    enoxaparin (LOVENOX) syringe 40 mg 40 mg Daily 3/5/2017     Sig - Route: Inject 0.4 mL under the skin Daily. - Subcutaneous    HYDROcodone-acetaminophen (NORCO) 5-325 MG per tablet 1 tablet 1 tablet Every 4 Hours PRN 3/4/2017 3/15/2017    Sig - Route: Take 1 tablet by mouth " "Every 4 (Four) Hours As Needed for moderate pain (4-6). - Oral    LORazepam (ATIVAN) injection 0.5 mg 0.5 mg Every 6 Hours PRN 3/4/2017 3/15/2017    Sig - Route: Infuse 0.25 mL into a venous catheter Every 6 (Six) Hours As Needed for anxiety. - Intravenous    Morphine sulfate (PF) injection 4 mg 4 mg Every 4 Hours PRN 3/5/2017 3/15/2017    Sig - Route: Infuse 1 mL into a venous catheter Every 4 (Four) Hours As Needed for severe pain (7-10). - Intravenous    naloxone (NARCAN) injection 0.4 mg 0.4 mg Every 5 Minutes PRN 3/4/2017     Sig - Route: Infuse 1 mL into a venous catheter Every 5 (Five) Minutes As Needed for respiratory depression. - Intravenous    Linked Group 1:  \"And\" Linked Group Details        ondansetron (ZOFRAN) injection 4 mg 4 mg Every 6 Hours PRN 3/4/2017     Sig - Route: Infuse 2 mL into a venous catheter Every 6 (Six) Hours As Needed for Nausea or Vomiting. - Intravenous    sodium chloride 0.9 % flush 1-10 mL 1-10 mL As Needed 3/4/2017     Sig - Route: Infuse 1-10 mL into a venous catheter As Needed for line care. - Intravenous    sodium chloride 0.9 % flush 10 mL 10 mL As Needed 3/4/2017     Sig - Route: Infuse 10 mL into a venous catheter As Needed for line care. - Intravenous    Cosign for Ordering: Accepted by Edmar Thomas MD on 3/4/2017  7:48 PM    sodium chloride 0.9 % with KCl 20 mEq/L infusion 125 mL/hr Continuous 3/5/2017     Sig - Route: Infuse 125 mL/hr into a venous catheter Continuous. - Intravenous          Lab Results (last 72 hours)     Procedure Component Value Units Date/Time    CBC & Differential [[id number]] Collected:  03/04/17 1524    Specimen:  Blood Updated:  03/04/17 1536    Narrative:       The following orders were created for panel order CBC & Differential.  Procedure                               Abnormality         Status                     ---------                               -----------         ------                     CBC Auto Differential[[id number]]         " Abnormal            Final result                 Please view results for these tests on the individual orders.    CBC Auto Differential [[id number]]  (Abnormal) Collected:  03/04/17 1524    Specimen:  Blood Updated:  03/04/17 1536     WBC 10.82 (H) 10*3/mm3      RBC 3.96 10*6/mm3      Hemoglobin 12.3 g/dL      Hematocrit 36.5 %      MCV 92.2 fL      MCH 31.1 (H) pg      MCHC 33.7 g/dL      RDW 12.5 %      RDW-SD 42.3 fl      MPV 10.9 fL      Platelets 171 10*3/mm3      Neutrophil % 75.9 (H) %      Lymphocyte % 17.6 (L) %      Monocyte % 5.9 %      Eosinophil % 0.3 %      Basophil % 0.1 %      Immature Grans % 0.2 %      Neutrophils, Absolute 8.22 10*3/mm3      Lymphocytes, Absolute 1.90 10*3/mm3      Monocytes, Absolute 0.64 10*3/mm3      Eosinophils, Absolute 0.03 (L) 10*3/mm3      Basophils, Absolute 0.01 10*3/mm3      Immature Grans, Absolute 0.02 10*3/mm3     Comprehensive Metabolic Panel [[id number]]  (Abnormal) Collected:  03/04/17 1524    Specimen:  Blood Updated:  03/04/17 1553     Glucose 120 (H) mg/dL      BUN 9 mg/dL      Creatinine 0.60 mg/dL      Sodium 135 mmol/L      Potassium 3.4 (L) mmol/L      Chloride 104 mmol/L      CO2 26.0 mmol/L      Calcium 8.9 mg/dL      Total Protein 6.8 g/dL      Albumin 4.10 g/dL      ALT (SGPT) 19 U/L      AST (SGOT) 15 U/L      Alkaline Phosphatase 69 U/L      Total Bilirubin 0.8 mg/dL      eGFR Non African Amer 117 mL/min/1.73      Globulin 2.7 gm/dL      A/G Ratio 1.5 g/dL      BUN/Creatinine Ratio 15.0      Anion Gap 5.0 mmol/L     Narrative:       [employer] Guidelines    Stage                           Description                             GFR                      1                               Normal or High                          90+  2                               Mild decrease                            60-89  3                               Moderate decrease                   30-59  4                               Severe decrease                        15-29  5                               Kidney failure                             <15    Lipase [[id number]]  (Normal) Collected:  03/04/17 1524    Specimen:  Blood Updated:  03/04/17 1553     Lipase 22 U/L     POCT pregnancy, urine [[id number]]  (Normal) Collected:  03/04/17 1603    Specimen:  Urine Updated:  03/04/17 1605     HCG, Urine, QL Negative      Lot Number [id number]      Internal Positive Control Positive      Internal Negative Control NA     Urinalysis With / Culture If Indicated [[id number]]  (Abnormal) Collected:  03/04/17 1556    Specimen:  Urine from Urine, Clean Catch Updated:  03/04/17 1609     Color, UA Yellow      Appearance, UA Clear      pH, UA 6.5      Specific Gravity, UA 1.025      Glucose, UA Negative      Ketones, UA >=80 mg/dL (3+) (A)      Bilirubin, UA Small (1+) (A)      Blood, UA Small (1+) (A)      Protein, UA Trace (A)      Leuk Esterase, UA Negative      Nitrite, UA Negative      Urobilinogen, UA 1.0 E.U./dL     Urinalysis, Microscopic Only [[id number]]  (Abnormal) Collected:  03/04/17 1556    Specimen:  Urine from Urine, Clean Catch Updated:  03/04/17 1609     RBC, UA 7-12 (A) /HPF      WBC, UA 6-12 (A) /HPF      Bacteria, UA 4+ (A) /HPF      Squamous Epithelial Cells, UA 7-12 (A) /HPF      Hyaline Casts, UA 7-12 /LPF      Methodology Automated Microscopy     Light Blue Top [[id number]] Collected:  03/04/17 1524    Specimen:  Blood Updated:  03/04/17 2001     Extra Tube hold for add-on       Auto resulted       Lavender Top [[id number]] Collected:  03/04/17 1524    Specimen:  Blood Updated:  03/04/17 2001     Extra Tube hold for add-on       Auto resulted       Green Top (Gel) [[id number]] Collected:  03/04/17 1524    Specimen:  Blood Updated:  03/04/17 2001     Extra Tube Hold for add-ons.       Auto resulted.       Gold Top - SST [[id number]] Collected:  03/04/17 1524    Specimen:  Blood Updated:  03/04/17 2001     Extra Tube Hold for add-ons.       Auto resulted.       [place] Draw  [[id number]] Collected:  03/04/17 1524    Specimen:  Blood Updated:  03/05/17 0138    Narrative:       The following orders were created for panel order [place] Draw.  Procedure                               Abnormality         Status                     ---------                               -----------         ------                     Light Blue Top[[id number]]                                    Final result               Green Top (Gel)[[id number]]                                   Final result               Lavender Top[[id number]]                                      Final result               Gold Top - SST[[id number]]                                    Final result               Green Top (No Gel)[[id number]]                                                             Please view results for these tests on the individual orders.    CBC Auto Differential [[id number]]  (Abnormal) Collected:  03/05/17 0447    Specimen:  Blood Updated:  03/05/17 0627     WBC 7.15 10*3/mm3      RBC 3.33 (L) 10*6/mm3      Hemoglobin 10.3 (L) g/dL      Hematocrit 31.7 (L) %      MCV 95.2 fL      MCH 30.9 pg      MCHC 32.5 g/dL      RDW 13.0 %      RDW-SD 45.3 fl      MPV 11.2 fL      Platelets 160 10*3/mm3      Neutrophil % 52.5 %      Lymphocyte % 36.8 %      Monocyte % 9.2 %      Eosinophil % 1.4 %      Basophil % 0.0 %      Immature Grans % 0.1 %      Neutrophils, Absolute 3.75 10*3/mm3      Lymphocytes, Absolute 2.63 10*3/mm3      Monocytes, Absolute 0.66 10*3/mm3      Eosinophils, Absolute 0.10 10*3/mm3      Basophils, Absolute 0.00 10*3/mm3      Immature Grans, Absolute 0.01 10*3/mm3     Basic Metabolic Panel [[id number]]  (Abnormal) Collected:  03/05/17 0447    Specimen:  Blood Updated:  03/05/17 0644     Glucose 85 mg/dL      BUN 7 (L) mg/dL      Creatinine 0.50 (L) mg/dL      Sodium 139 mmol/L      Potassium 4.0 mmol/L      Chloride 110 (H) mmol/L      CO2 27.0 mmol/L      Calcium 7.9 (L) mg/dL      eGFR Non African Amer 145 mL/min/1.73       BUN/Creatinine Ratio 14.0      Anion Gap 2.0 (L) mmol/L     Narrative:       National Kidney Foundation Guidelines    Stage                           Description                             GFR                      1                               Normal or High                          90+  2                               Mild decrease                            60-89  3                               Moderate decrease                   30-59  4                               Severe decrease                       15-29  5                               Kidney failure                             <15    Urine Culture [[id number]]  (Abnormal) Collected:  03/04/17 1556    Specimen:  Urine from Urine, Clean Catch Updated:  03/06/17 1033     Urine Culture >100,000 CFU/mL Enterococcus species (A)     CBC & Differential [[id number]] Collected:  03/07/17 0425    Specimen:  Blood Updated:  03/07/17 0523    Narrative:       The following orders were created for panel order CBC & Differential.  Procedure                               Abnormality         Status                     ---------                               -----------         ------                     CBC Auto Differential[[id number]]         Abnormal            Final result                 Please view results for these tests on the individual orders.    CBC Auto Differential [[id number]]  (Abnormal) Collected:  03/07/17 0425    Specimen:  Blood Updated:  03/07/17 0523     WBC 5.31 10*3/mm3      RBC 3.62 (L) 10*6/mm3      Hemoglobin 11.2 (L) g/dL      Hematocrit 34.2 (L) %      MCV 94.5 fL      MCH 30.9 pg      MCHC 32.7 g/dL      RDW 12.5 %      RDW-SD 43.1 fl      MPV 11.3 fL      Platelets 177 10*3/mm3      Neutrophil % 63.2 %      Lymphocyte % 28.1 %      Monocyte % 7.0 %      Eosinophil % 1.5 %      Basophil % 0.2 %      Immature Grans % 0.0 %      Neutrophils, Absolute 3.36 10*3/mm3      Lymphocytes, Absolute 1.49 10*3/mm3      Monocytes, Absolute 0.37 10*3/mm3       Eosinophils, Absolute 0.08 (L) 10*3/mm3      Basophils, Absolute 0.01 10*3/mm3      Immature Grans, Absolute 0.00 10*3/mm3     Comprehensive Metabolic Panel [[id number]]  (Abnormal) Collected:  03/07/17 0425    Specimen:  Blood Updated:  03/07/17 0538     Glucose 92 mg/dL      BUN 11 mg/dL      Creatinine 0.60 mg/dL      Sodium 139 mmol/L      Potassium 4.6 mmol/L      Chloride 109 mmol/L      CO2 27.0 mmol/L      Calcium 8.3 (L) mg/dL      Total Protein 5.0 (L) g/dL      Albumin 3.20 g/dL      ALT (SGPT) 150 (H) U/L      AST (SGOT) 81 (H) U/L      Alkaline Phosphatase 92 U/L      Total Bilirubin 0.3 mg/dL      eGFR Non African Amer 117 mL/min/1.73      Globulin 1.8 gm/dL      A/G Ratio 1.8 g/dL      BUN/Creatinine Ratio 18.3      Anion Gap 3.0 mmol/L     Narrative:       National Kidney Foundation Guidelines    Stage                           Description                             GFR                      1                               Normal or High                          90+  2                               Mild decrease                            60-89  3                               Moderate decrease                   30-59  4                               Severe decrease                       15-29  5                               Kidney failure                             <15          Imaging Results (last 72 hours)     Procedure Component Value Units Date/Time    CT Abdomen Pelvis Without Contrast [[id number]]  (Abnormal) Collected:  03/04/17 1842     Updated:  03/04/17 1958    Narrative:       EXAM:  CT Abdomen and Pelvis Without Intravenous Contrast.    CLINICAL HISTORY:  31 years old, female; Pain; Abdominal pain; Flank; Right; Prior surgery;   Surgery date: 6+ months; Surgery type: Appy, tubal; Additional info: Right side   abd / flank pain    TECHNIQUE:  Axial computed tomography images of the abdomen and pelvis without intravenous   contrast.  This CT exam was performed using one or more of the  following dose   reduction techniques:  automated exposure control, adjustment of the mA and/or   kV according to patient size, and/or use of iterative reconstruction technique.  Coronal reformatted images were created and reviewed.    COMPARISON:  No relevant prior studies available.    FINDINGS:  Lower thorax: No acute findings.    ABDOMEN:  Liver:  Normal.  Gallbladder and bile ducts:  Normal.  Pancreas:  Normal.  Spleen:  Normal.  Adrenals:  Normal.  Kidneys and ureters:  Minimal left hydroureteronephrosis, with minimal left   perinephric and periureteral fat stranding, without obstructive etiology   identified.  Bilateral nonobstructive nephrolithiasis.  Stomach and bowel:  Normal.  Appendix:  No findings to suggest acute appendicitis.    PELVIS:  Bladder:  Normal.  Reproductive:  Bilateral tubal ligation clips in place.    ABDOMEN and PELVIS:  Intraperitoneal space:  Some of the pelvic free fluid, likely physiologic.  No   free air.  Bones/joints:  No acute fracture.  No dislocation.  Soft tissues:  Normal.  Vasculature:  Phleboliths within the pelvis.  No abdominal aortic aneurysm.  Lymph nodes:  Normal.      Impression:         1.  Minimal left hydroureteronephrosis, with minimal left perinephric and   periureteral fat stranding, without obstructive etiology identified.  Findings   may be secondary to recently passed calculus. Concomitant urinary tract   infection is possible.    2.  Incidental/non-acute findings are described above.      THIS DOCUMENT HAS BEEN ELECTRONICALLY SIGNED BY GURJIT BLAS MD    US Gallbladder [[id number]] Collected:  03/04/17 1849     Updated:  03/06/17 1044    Narrative:       EXAMINATION: US GALLBLADDER - 03/04/2017     INDICATION: Right upper quadrant pain, fever.     TECHNIQUE: Sonographic imaging is obtained of the right upper quadrant  in both the sagittal and transverse planes.     COMPARISON: None.     FINDINGS: The pancreas is homogeneous in its visualized portions.  The  liver is homogeneous and normal in echotexture and echogenicity with no  evidence of focal mass or intrahepatic biliary ductal dilatation. The  gallbladder reveals no evidence of stones, gallbladder wall thickening  or pericholecystic fluid. The common bile duct is within normal limits  measuring 3 mm. The right kidney is normal in size, configuration and  texture measuring in length from pole to pole 14.8 cm. There are small  nonshadowing echogenic foci throughout suggesting possible small  nonobstructing stones within the kidney. There is no evidence of  hydronephrosis. No solid mass or renal cortical cyst identified.       Impression:       Possible small nonobstructing stones within the kidney. The  remainder of the right upper quadrant ultrasound is unremarkable.      DICTATED:     03/04/2017  EDITED:         03/04/2017     This report was finalized on 3/6/2017 10:42 AM by Dr. Christelle You MD.             ECG/EMG Results (last 72 hours)     ** No results found for the last 72 hours. **        Orders (last 72 hrs)     Start     Ordered    03/07/17 1106  Inpatient Admission  Once      03/07/17 1105    03/07/17 0636  Stone Analysis  Once,   Status:  Canceled      03/07/17 0636    03/07/17 0600  CBC & Differential  Morning Draw      03/06/17 1128    03/07/17 0600  Comprehensive Metabolic Panel  Morning Draw      03/06/17 1128    03/07/17 0600  CBC Auto Differential  PROCEDURE ONCE      03/07/17 0001    03/06/17 1100  sodium chloride 0.9 % bolus 500 mL  Once      03/06/17 1024    03/06/17 1026  Strict Intake & Output  Every Shift      03/06/17 1025    03/06/17 0145  calcium carbonate (TUMS) chewable tablet 500 mg (200 mg elemental)  Once      03/06/17 0104    03/05/17 1800  cefTRIAXone (ROCEPHIN) IVPB 1 g  Every 24 Hours      03/04/17 2318    03/05/17 0916  ketorolac (TORADOL) injection 30 mg  Every 6 Hours PRN      03/05/17 0917    03/05/17 0900  enoxaparin (LOVENOX) syringe 40 mg  Daily      03/04/17  2318 03/05/17 0854  Morphine sulfate (PF) injection 4 mg  Every 4 Hours PRN      03/05/17 0854    03/05/17 0600  Basic Metabolic Panel  Morning Draw      03/04/17 2318 03/05/17 0600  CBC Auto Differential  Morning Draw      03/04/17 2318 03/05/17 0000  Vital Signs  Every 4 Hours      03/04/17 2318 03/05/17 0000  Strict Intake and Output  Every Hour      03/04/17 2318 03/05/17 0000  sodium chloride 0.9 % with KCl 20 mEq/L infusion  Continuous      03/04/17 2318 03/04/17 2319  Remove & Replace Compression Stockings (TEDS) Daily  Daily      03/04/17 2318 03/04/17 2318  ondansetron (ZOFRAN) injection 4 mg  Every 6 Hours PRN      03/04/17 2318 03/04/17 2318  LORazepam (ATIVAN) injection 0.5 mg  Every 6 Hours PRN      03/04/17 2318 03/04/17 2318  HYDROmorphone (DILAUDID) injection 0.5 mg  Every 2 Hours PRN,   Status:  Discontinued      03/04/17 2318 03/04/17 2318  naloxone (NARCAN) injection 0.4 mg  Every 5 Minutes PRN      03/04/17 2318 03/04/17 2318  Diet Regular; Cardiac, Consistent Carbohydrate  Diet Effective Now      03/04/17 2318 03/04/17 2317  HYDROcodone-acetaminophen (NORCO) 5-325 MG per tablet 1 tablet  Every 4 Hours PRN      03/04/17 2318 03/04/17 2317  acetaminophen (TYLENOL) tablet 650 mg  Every 4 Hours PRN      03/04/17 2318 03/04/17 2313  VTE Risk Assessment - Moderate Risk  Once      03/04/17 2318 03/04/17 2313  Place Compression Stockings (TEDs)  Once      03/04/17 2318 03/04/17 2313  Place Sequential Compression Device  Once      03/04/17 2318 03/04/17 2313  Maintain Sequential Compression Device  Continuous      03/04/17 2318 03/04/17 2313  Notify Physician (With Default Parameters)  Until Discontinued      03/04/17 2318 03/04/17 2312  Weigh patient  Once      03/04/17 2318    03/04/17 2312  Oxygen Therapy-  Continuous      03/04/17 2318    03/04/17 2312  Insert Peripheral IV  Once      03/04/17 2318    03/04/17 2312  Saline Lock & Maintain IV  Access  Continuous      03/04/17 2318 03/04/17 2312  Initiate Observation Status  Once      03/04/17 2318 03/04/17 2312  Full Code  Continuous      03/04/17 2318    03/04/17 2311  sodium chloride 0.9 % flush 1-10 mL  As Needed      03/04/17 2318    03/04/17 2136  Med / Surg Bed Request  Once      03/04/17 2136    03/04/17 2107  ibuprofen (ADVIL,MOTRIN) tablet 800 mg  Once      03/04/17 2105 03/04/17 2023  ketorolac (TORADOL) injection 30 mg  Once      03/04/17 2021 03/04/17 2023  Morphine sulfate (PF) injection 4 mg  Once      03/04/17 2021 03/04/17 1843  sodium chloride 0.9 % bolus 1,000 mL  Once      03/04/17 1841    03/04/17 1842  CT Abdomen Pelvis Without Contrast  1 Time Imaging     Comments:  NON-CONTRASTED STUDY      03/04/17 1841    03/04/17 1818  HYDROmorphone (DILAUDID) injection 1 mg  Once      03/04/17 1816    03/04/17 1730  cefTRIAXone (ROCEPHIN) IVPB 1 g  Once      03/04/17 1728    03/04/17 1609  Urine Culture  Once      03/04/17 1608    03/04/17 1608  Urinalysis, Microscopic Only  Once      03/04/17 1607    03/04/17 1558  sodium chloride 0.9 % bolus 1,000 mL  Once      03/04/17 1556    03/04/17 1558  Morphine sulfate (PF) injection 4 mg  Once      03/04/17 1556    03/04/17 1558  ondansetron (ZOFRAN) injection 4 mg  Once      03/04/17 1556    03/04/17 1558  pantoprazole (PROTONIX) injection 40 mg  Once      03/04/17 1556    03/04/17 1556  US Gallbladder  1 Time Imaging      03/04/17 1556    03/04/17 1524  NPO Diet  Diet Effective Now,   Status:  Canceled      03/04/17 1523    03/04/17 1524  Undress and Gown  Once      03/04/17 1523    03/04/17 1524  Insert peripheral IV  Once      03/04/17 1523    03/04/17 1524  [place] Draw  Once      03/04/17 1523    03/04/17 1524  CBC & Differential  Once      03/04/17 1523    03/04/17 1524  Comprehensive Metabolic Panel  Once      03/04/17 1523    03/04/17 1524  Lipase  Once      03/04/17 1523    03/04/17 1524  Urinalysis With / Culture If Indicated   "Once      03/04/17 1523    03/04/17 1524  POCT pregnancy, urine  Once      03/04/17 1523    03/04/17 1524  Light Blue Top  PROCEDURE ONCE      03/04/17 1523    03/04/17 1524  Green Top (Gel)  PROCEDURE ONCE      03/04/17 1523    03/04/17 1524  Lavender Top  PROCEDURE ONCE      03/04/17 1523    03/04/17 1524  Gold Top - SST  PROCEDURE ONCE      03/04/17 1523    03/04/17 1524  Green Top (No Gel)  PROCEDURE ONCE,   Status:  Canceled      03/04/17 1523    03/04/17 1524  CBC Auto Differential  PROCEDURE ONCE      03/04/17 1523    03/04/17 1524  Obtain medical records  Once     Comments:  [facility] VISIT LAST NIGHT    03/04/17 1523    03/04/17 1523  sodium chloride 0.9 % flush 10 mL  As Needed      03/04/17 1523    --  HYDROcodone-acetaminophen (NORCO) 5-325 MG per tablet  Every 6 Hours PRN      03/04/17 1521    --  ondansetron (ZOFRAN) 4 MG tablet  Every 8 Hours PRN      03/04/17 1521             Physician Progress Notes (last 72 hours) (Notes from 3/4/2017 11:24 AM through 3/7/2017 11:24 AM)      Wander Aguilar MD at 3/5/2017 10:03 AM  Version 1 of 1             [facility] Medicine Services  INPATIENT PROGRESS NOTE    Date of Admission: 3/4/2017  Length of Stay: 0  Primary Care Physician: Paolo Bhandari MD    Subjective   CC: right flank pain    HPI:  Continues to have significant right flank pain.  Wants to change to morphine from dilaudid as that seems to be making her HA worse.  Per , did have temp 101.8 yesterday morning.  Had not yet taken cipro prescribed on Friday.  + \"strong smelling urine and dark\".  Reported negative flu swab as well.    Review Of Systems:   Review of Systems   Constitutional: Positive for fever.   Respiratory: Negative for cough.    Cardiovascular: Negative for chest pain.   Gastrointestinal: Positive for abdominal pain.   Genitourinary: Positive for dysuria.   Neurological: Positive for headaches.   All other systems reviewed and are negative.        Objective  "     Temp:  [97.3 °F (36.3 °C)-100.1 °F (37.8 °C)] 97.7 °F (36.5 °C)  Heart Rate:  [] 65  Resp:  [16-18] 18  BP: ()/(52-72) 103/59  Physical Exam   Constitutional: She is oriented to person, place, and time. She appears well-developed and well-nourished.   Lying in bed with wet washcloth on forehead   Eyes: Pupils are equal, round, and reactive to light.   Cardiovascular: Normal rate, regular rhythm and normal heart sounds.    No murmur heard.  Pulmonary/Chest: Effort normal and breath sounds normal. No respiratory distress.   Abdominal:   Mild right sided abd pain.  Moderate right CVA tenderness   Musculoskeletal: She exhibits no edema.   Neurological: She is alert and oriented to person, place, and time.   Skin: Skin is warm and dry. No rash noted.   Psychiatric: She has a normal mood and affect.   Vitals reviewed.      Results Review:    I have reviewed the labs, radiology results and diagnostic studies.      Results from last 7 days  Lab Units 03/05/17  0447   WBC 10*3/mm3 7.15   HEMOGLOBIN g/dL 10.3*   PLATELETS 10*3/mm3 160       Results from last 7 days  Lab Units 03/05/17  0447   SODIUM mmol/L 139   POTASSIUM mmol/L 4.0   CHLORIDE mmol/L 110*   TOTAL CO2 mmol/L 27.0   BUN mg/dL 7*   CREATININE mg/dL 0.50*   GLUCOSE mg/dL 85   CALCIUM mg/dL 7.9*       Culture Data:     URINE CULTURE   Date Value Ref Range Status   03/04/2017 Culture in progress  Preliminary       Radiology Data:   Abd CT - mild left hydro and perinephric stranding    I have reviewed the medications.    Assessment/Plan     Problem List  Principal Problem:    Pyelonephritis  Active Problems:    Acute flank pain    Kidney stones    Hydronephrosis, left    Hypokalemia    Assessment/Plan:  - has persistent right flank pain.  Interestingly, abnormalities on the CT are on the left side.  No evidence of stone or obstruction on right.  - Will continue rocephin, await urine culture results.  - will change narcs to morphine and also continue  toradol which she feels is hoping the most.  I reviewed [facility] and she has no controlled substances prescribed in last year.  - flu negative at OSH  - continue IVF, K+ better  - home TBD based on symptoms, could be tomorrow is she is improved.    High complexity.    Wander Aguilar MD   03/05/17   10:03 AM    Please note that portions of this note may have been completed with a voice recognition program. Efforts were made to edit the dictations, but occasionally words are mistranscribed.       Electronically signed by Wander Aguilar MD at 3/5/2017 10:10 AM      ALETHEA Morales at 3/6/2017 11:22 AM  Version 1 of 1             [facility] Medicine Services  INPATIENT PROGRESS NOTE    Date of Admission: 3/4/2017  Length of Stay: 0  Primary Care Physician: Paolo Bhandari MD    Subjective   CC: right flank pain    HPI:  Patient continues to have fairly significant right flank pain that does improve with pain meds for a while.  Denies any fevers but she states that she broke out into a sweat last night.  Still having a significant amount of burning when she urinates.  Per nursing staff, she has had very little urine output in the past 24 hours.      Review Of Systems:   Review of Systems   Constitutional: Positive for fever.   Respiratory: Negative for cough.    Cardiovascular: Negative for chest pain.   Gastrointestinal: Positive for abdominal pain.   Genitourinary: Positive for dysuria.   Neurological: Positive for headaches.   All other systems reviewed and are negative.        Objective      Temp:  [98 °F (36.7 °C)-98.2 °F (36.8 °C)] 98 °F (36.7 °C)  Heart Rate:  [57-71] 57  Resp:  [12-18] 12  BP: ()/(54-63) 92/56  Physical Exam   Constitutional: She is oriented to person, place, and time. She appears well-developed and well-nourished.   Lying in bed on her right side with  at bedside.  Appears uncomfortable.   Eyes: Pupils are equal, round, and reactive to light.    Cardiovascular: Normal rate, regular rhythm and normal heart sounds.    No murmur heard.  Pulmonary/Chest: Effort normal and breath sounds normal. No respiratory distress.   Abdominal:   Mild right sided abd pain, specifically when she gets up to urinate.  Moderate right CVA tenderness   Musculoskeletal: She exhibits no edema.   Neurological: She is alert and oriented to person, place, and time.   Skin: Skin is warm and dry. No rash noted.   Psychiatric: She has a normal mood and affect.   Vitals reviewed.      Results Review:    I have reviewed the labs, radiology results and diagnostic studies.      Results from last 7 days  Lab Units 03/05/17  0447   WBC 10*3/mm3 7.15   HEMOGLOBIN g/dL 10.3*   PLATELETS 10*3/mm3 160       Results from last 7 days  Lab Units 03/05/17  0447   SODIUM mmol/L 139   POTASSIUM mmol/L 4.0   CHLORIDE mmol/L 110*   TOTAL CO2 mmol/L 27.0   BUN mg/dL 7*   CREATININE mg/dL 0.50*   GLUCOSE mg/dL 85   CALCIUM mg/dL 7.9*       Culture Data:     URINE CULTURE   Date Value Ref Range Status   03/04/2017 Culture in progress  Preliminary       Radiology Data:   Abd CT - mild left hydro and perinephric stranding    I have reviewed the medications.    Assessment/Plan     Problem List  Principal Problem:    Pyelonephritis  Active Problems:    Acute flank pain    Kidney stones    Hydronephrosis, left    Hypokalemia    Assessment/Plan:  - has persistent right flank pain. - Will continue rocephin, await urine culture results.  - continue morphine and toradol  -mildly hypotensive this am with some decreased urine output.  Will give a 500 ml fluid bolus and monitor.  -CBC, CMP in am  - flu negative at OSH  - continue IVF, K+ better  - home TBD based on symptoms    High complexity.    Kenisha Langford, ALETHEA   03/06/17   11:22 AM    Please note that portions of this note may have been completed with a voice recognition program. Efforts were made to edit the dictations, but occasionally words are  mistranscribed.       Electronically signed by ALETHEA Morales at 3/6/2017 11:27 AM        Consult Notes (last 72 hours) (Notes from 3/4/2017 11:24 AM through 3/7/2017 11:24 AM)     No notes of this type exist for this encounter.

## 2017-03-07 NOTE — PLAN OF CARE
Problem: Pain, Acute (Adult)  Goal: Acceptable Pain Control/Comfort Level  Outcome: Ongoing (interventions implemented as appropriate)    03/06/17 0588   Pain, Acute (Adult)   Acceptable Pain Control/Comfort Level making progress toward outcome

## 2017-03-07 NOTE — PROGRESS NOTES
[facility] Medicine Services  INPATIENT PROGRESS NOTE    Date of Admission: 3/4/2017  Length of Stay: 0  Primary Care Physician: Paolo Bhandari MD  Subjective   CC: right flank pain    HPI:  Patient complaining of right flank pain and a terrible headache.  Apparently whenever patient gets narcotics she gets a severe headache and nausea.  Patient has a washcloth over her face due to the photophobia.    Review Of Systems:   Review of Systems   Constitutional: Positive for fever.   Respiratory: Negative for cough.    Cardiovascular: Negative for chest pain.   Gastrointestinal: Positive for abdominal pain.   Genitourinary: Positive for dysuria.   Neurological: Positive for headaches.   All other systems reviewed and are negative.    Objective      Temp:  [98.3 °F (36.8 °C)-98.7 °F (37.1 °C)] 98.5 °F (36.9 °C)  Heart Rate:  [62-65] 65  Resp:  [16-18] 18  BP: (111-117)/(55-73) 117/67  Physical Exam   Constitutional: She appears well-developed and well-nourished. She appears distressed.   Lying in bed with  at bedside.  Appears uncomfortable with washcloth over her face and complaining of a headache.   HENT:   Head: Normocephalic and atraumatic.   Mouth/Throat: Oropharynx is clear and moist.   Eyes: Pupils are equal, round, and reactive to light.   Photophobic   Neck: Normal range of motion. Neck supple. No JVD present. No tracheal deviation present.   Cardiovascular: Normal rate, regular rhythm, normal heart sounds and intact distal pulses.  Exam reveals no gallop and no friction rub.    No murmur heard.  Pulmonary/Chest: Effort normal and breath sounds normal. No stridor. No respiratory distress. She has no wheezes. She has no rales.   Abdominal: Soft. Bowel sounds are normal. She exhibits no distension. There is tenderness.   Mild right sided abd pain.  Moderate right CVA tenderness, obese   Musculoskeletal: Normal range of motion. She exhibits no edema, tenderness or deformity.    Neurological: She is alert.   Oriented to person, place, time and situation.  Speech is clear, follows all commands.  Recent remote memory intact.   Skin: Skin is warm and dry. No rash noted. She is not diaphoretic.   Psychiatric: She has a normal mood and affect. Her behavior is normal. Judgment and thought content normal.   Vitals reviewed.    Results Review:    I have reviewed the labs, radiology results and diagnostic studies.    Results from last 7 days  Lab Units 03/07/17  0425   WBC 10*3/mm3 5.31   HEMOGLOBIN g/dL 11.2*   PLATELETS 10*3/mm3 177       Results from last 7 days  Lab Units 03/07/17  0425   SODIUM mmol/L 139   POTASSIUM mmol/L 4.6   CHLORIDE mmol/L 109   TOTAL CO2 mmol/L 27.0   BUN mg/dL 11   CREATININE mg/dL 0.60   GLUCOSE mg/dL 92   CALCIUM mg/dL 8.3*     Culture Data:   Cultures:    URINE CULTURE   Date Value Ref Range Status   03/04/2017 >100,000 CFU/mL Enterococcus faecalis (A)  Final     Radiology Data:   Abd CT - mild left hydro and perinephric stranding    I have reviewed the medications.  Assessment/Plan   Problem List  Principal Problem:    Pyelonephritis  Active Problems:    Acute flank pain    Kidney stones    Hydronephrosis, left    Hypokalemia    Assessment/Plan:  - has persistent right flank pain.   - Ceftriaxone not on susceptibilities, so antibiotics will be changed to Levaquin  - continue toradol  - DC narcotics since they appear to be giving patient severe headaches and nausea every time she receives them  -mildly hypotensive yesterday, but normotensive today  -CBC, CMP in am; much improved from yesterday  - flu negative at OSH  - continue IVF, K+ better    Dispo: Tomorrow    High complexity.    Evi Subramanian, ALETHEA   03/07/17   5:39 PM    Please note that portions of this note may have been completed with a voice recognition program. Efforts were made to edit the dictations, but occasionally words are mistranscribed.

## 2017-03-08 VITALS
HEIGHT: 64 IN | SYSTOLIC BLOOD PRESSURE: 105 MMHG | DIASTOLIC BLOOD PRESSURE: 70 MMHG | OXYGEN SATURATION: 96 % | BODY MASS INDEX: 34.47 KG/M2 | TEMPERATURE: 98.2 F | WEIGHT: 201.9 LBS | HEART RATE: 57 BPM | RESPIRATION RATE: 16 BRPM

## 2017-03-08 PROCEDURE — 25010000002 KETOROLAC TROMETHAMINE PER 15 MG: Performed by: NURSE PRACTITIONER

## 2017-03-08 PROCEDURE — 99238 HOSP IP/OBS DSCHRG MGMT 30/<: CPT | Performed by: FAMILY MEDICINE

## 2017-03-08 PROCEDURE — 25810000003 SODIUM CHLORIDE 0.9 % WITH KCL 20 MEQ 20-0.9 MEQ/L-% SOLUTION: Performed by: PHYSICIAN ASSISTANT

## 2017-03-08 PROCEDURE — 25010000002 ENOXAPARIN PER 10 MG: Performed by: FAMILY MEDICINE

## 2017-03-08 PROCEDURE — 25010000002 LORAZEPAM PER 2 MG: Performed by: FAMILY MEDICINE

## 2017-03-08 RX ORDER — HYDROCODONE BITARTRATE AND ACETAMINOPHEN 5; 325 MG/1; MG/1
1 TABLET ORAL EVERY 8 HOURS PRN
Qty: 10 TABLET | Refills: 0 | Status: SHIPPED | OUTPATIENT
Start: 2017-03-08 | End: 2017-03-13

## 2017-03-08 RX ORDER — LEVOFLOXACIN 750 MG/1
750 TABLET ORAL DAILY
Qty: 7 TABLET | Refills: 0 | Status: SHIPPED | OUTPATIENT
Start: 2017-03-08 | End: 2017-03-15

## 2017-03-08 RX ORDER — SACCHAROMYCES BOULARDII 250 MG
250 CAPSULE ORAL 2 TIMES DAILY
Qty: 14 CAPSULE | Refills: 0 | Status: SHIPPED | OUTPATIENT
Start: 2017-03-08 | End: 2017-03-15

## 2017-03-08 RX ADMIN — LORAZEPAM 0.5 MG: 2 INJECTION, SOLUTION INTRAMUSCULAR; INTRAVENOUS at 00:41

## 2017-03-08 RX ADMIN — ENOXAPARIN SODIUM 40 MG: 40 INJECTION SUBCUTANEOUS at 09:21

## 2017-03-08 RX ADMIN — KETOROLAC TROMETHAMINE 30 MG: 30 INJECTION, SOLUTION INTRAMUSCULAR at 04:09

## 2017-03-08 RX ADMIN — POTASSIUM CHLORIDE AND SODIUM CHLORIDE 125 ML/HR: 900; 150 INJECTION, SOLUTION INTRAVENOUS at 09:21

## 2017-03-08 RX ADMIN — POTASSIUM CHLORIDE AND SODIUM CHLORIDE 125 ML/HR: 900; 150 INJECTION, SOLUTION INTRAVENOUS at 00:36

## 2017-03-08 NOTE — PLAN OF CARE
Problem: Patient Care Overview (Adult)  Goal: Plan of Care Review    03/08/17 0339   Outcome Evaluation   Outcome Summary/Follow up Plan Pain control with toradol. Continue to strain all urine. UOP improved. T/S. No BM since admission.

## 2017-03-08 NOTE — PAYOR COMM NOTE
"Marta Eemry (31 y.o. Female) Auth # [id number] Discharge notification    Date of Birth Social Security Number Address Home Phone MRN    1985  [address] [place], [place] [address] [phone] [id number]    [employer] Status          None        Admission Date Admission Type Admitting Provider Attending Provider Department, Room/Bed    3/4/17 Emergency Mann Emerson MD  [address] GYN, N550/1    Discharge Date Discharge Disposition Discharge Destination        3/8/2017 Home or Self Care             Attending Provider: (none)    Allergies:  No Known Allergies    Isolation:  None   Infection:  None   Code Status:  FULL    Ht:  64\" (162.6 cm)   Wt:  201 lb 14.4 oz (91.6 kg)    Admission Cmt:  None   Principal Problem:  Pyelonephritis [N12]                 Active Insurance as of 3/4/2017     Primary Coverage     Payor Plan Insurance Group Employer/Plan Group    CIGNA MISC CIGNA [id number]     Coverage Address Coverage Phone Number Effective From Effective To    [address] [phone] 3/3/2017     [place] [address]       Subscriber Name Subscriber Birth Date Member ID       ABBEY EMERY [id number]           Secondary Coverage     Payor Plan Insurance Group Employer/Plan Group    [place]      Payor Plan Address Payor Plan Phone Number Effective From Effective To    [address] [address]  6/1/2016     [place] [address]       Subscriber Name Subscriber Birth Date Member ID       MARTA EMERY 1985 [id number]                 Emergency Contacts      (Rel.) Home Phone Work Phone Mobile Phone    Abbey Emery (Spouse) [phone] -- [phone]            Insurance Information                CIGNA/MISC CIGNA Phone: [phone]    Subscriber: Abbey Emery Subscriber#: [id number]    Group#: [id number] Precert#: Pending ref# [id number]        [place] Phone:     Subscriber: " Marta Amaya Subscriber#: [id number]    Group#:  Precert#:

## 2017-03-08 NOTE — DISCHARGE SUMMARY
"    [facility] Medicine Services  DISCHARGE SUMMARY       Date of Admission: 3/4/2017  Date of Discharge:  3/8/2017  Primary Care Physician: Paolo Bhandari MD    Discharge Diagnoses:  Active Hospital Problems (** Indicates Principal Problem)    Diagnosis Date Noted   • **Pyelonephritis [N12] 03/04/2017   • Hydronephrosis, left [N13.30] 03/05/2017   • Hypokalemia [E87.6] 03/05/2017   • Acute flank pain [R10.9] 03/04/2017   • Kidney stones [N20.0]       Resolved Hospital Problems    Diagnosis Date Noted Date Resolved   • Bandemia [D72.825] 03/04/2017 03/05/2017       Presenting Problem/History of Present Illness  Pyelonephritis [N12]  Pyelonephritis [N12]     Hospital Course  Patient is a 31 y.o. female presented with fever and flank pain, went to [facility] ED day prior to admission at [facility] for similar symptoms. She reported a few days of lower abdominal pain that radiates to her right flank with associated dysuria, frequency and urgency. She described associated fever, chills, nausea and vomiting, denied gross hematuria, pelvic pain, vaginal discharge or rashes. She described a history of problems with \"UTI's.\"  Workup in ED revealed a UTI, CMP showed normal kidney function.  WBC on admission was 10.  UPT was negative.  Abd Us showed possible small nonobstructing stones within the kidney. The remainder of the right upper quadrant ultrasound was unremarkable.  CT abd/pelvis showed Minimal left hydroureteronephrosis, with minimal left   perinephric and periureteral fat stranding, without obstructive etiology identified. Bilateral nonobstructive nephrolithiasis.  Urine Cx grew Enterococcus faecalis.   Initially patient was treated with ceftriaxone, however, ceftriaxone not on susceptibilities, although Enterococcus was susceptible to PCN so ceftriaxone may have provided some coverage, based on urine cx, antibiotics were changed to Levaquin.  Will need to finish 10 days of abx.  Patient now feels " "better and ready to go home.    Procedures Performed-NONE       Consults:   Consults     No orders found from 2/3/2017 to 3/5/2017.          Pertinent Test Results:  As per above    Condition on Discharge:  stable    Physical Exam on Discharge:  Visit Vitals   • /70 (BP Location: Left arm, Patient Position: Lying)   • Pulse 57   • Temp 98.2 °F (36.8 °C) (Oral)   • Resp 16   • Ht 64\" (162.6 cm)   • Wt 201 lb 14.4 oz (91.6 kg)   • LMP 03/01/2017   • SpO2 96%   • Breastfeeding No   • BMI 34.66 kg/m2     Physical Exam  GEN:  AOX3, NAD  HEENT:  PERRL EOMI  NECK: no LAD, no masses  CV:  RRR, no M/R/G, +2 pulses radially  LUNGS:  CTAB, unlabored breathing  ABD:  Soft, NT, ND, +BS, no masses, no HSM, no rebound  EXT:  No c/c/e  Neuro:  CN2-12 intact, no focal paralysis or weakness noted.  Skin:  No rash, no jaundice  Psych:  Mood and affect wnl    Discharge Disposition  Home or Self Care    Discharge Medications   Marta Amaya   Home Medication Instructions [id number]    Printed on:03/08/17 0906   Medication Information                      HYDROcodone-acetaminophen (NORCO) 5-325 MG per tablet  Take 1 tablet by mouth Every 8 (Eight) Hours As Needed for moderate pain (4-6) for up to 5 days.             levoFLOXacin (LEVAQUIN) 750 MG tablet  Take 1 tablet by mouth Daily for 7 days.             ondansetron (ZOFRAN) 4 MG tablet  Take 4 mg by mouth Every 8 (Eight) Hours As Needed for nausea or vomiting.             saccharomyces boulardii (FLORASTOR) 250 MG capsule  Take 1 capsule by mouth 2 (Two) Times a Day for 7 days.                 Discharge Diet:   Diet Instructions     Diet: Regular; Thin Liquids, No Restrictions       Discharge Diet:  Regular   Fluid Consistency:  Thin Liquids, No Restrictions                 Discharge Care Plan / Instructions:    Activity at Discharge:   Activity Instructions     Activity as Tolerated                     Follow-up Appointments  No future appointments.  Additional " Instructions for the Follow-ups that You Need to Schedule     Discharge Follow-up with PCP    As directed    Follow Up Details:  Dr Bhandari in 1 week                 Test Results Pending at Discharge       True Montalvo MD 03/08/17 9:06 AM    Time: Discharge 30 min    Please note that portions of this note may have been completed with a voice recognition program. Efforts were made to edit the dictations, but occasionally words are mistranscribed.

## 2017-03-08 NOTE — PLAN OF CARE
Problem: Patient Care Overview (Adult)  Goal: Plan of Care Review  Outcome: Ongoing (interventions implemented as appropriate)  Goal: Discharge Needs Assessment  Outcome: Ongoing (interventions implemented as appropriate)    Problem: Pain, Acute (Adult)  Goal: Identify Related Risk Factors and Signs and Symptoms  Outcome: Ongoing (interventions implemented as appropriate)    Problem: Infection, Risk/Actual (Adult)  Goal: Identify Related Risk Factors and Signs and Symptoms  Outcome: Ongoing (interventions implemented as appropriate)  Goal: Infection Prevention/Resolution  Outcome: Ongoing (interventions implemented as appropriate)

## 2017-03-09 ENCOUNTER — TRANSITIONAL CARE MANAGEMENT TELEPHONE ENCOUNTER (OUTPATIENT)
Dept: FAMILY MEDICINE CLINIC | Facility: CLINIC | Age: 32
End: 2017-03-09

## 2017-03-09 PROBLEM — Z72.0 TOBACCO ABUSE: Status: ACTIVE | Noted: 2017-03-09

## 2017-03-15 ENCOUNTER — CONVERSION ENCOUNTER (OUTPATIENT)
Dept: FAMILY MEDICINE CLINIC | Facility: CLINIC | Age: 32
End: 2017-03-15

## 2017-03-15 ENCOUNTER — OFFICE VISIT (OUTPATIENT)
Dept: FAMILY MEDICINE CLINIC | Facility: CLINIC | Age: 32
End: 2017-03-15

## 2017-03-15 VITALS
BODY MASS INDEX: 34.31 KG/M2 | SYSTOLIC BLOOD PRESSURE: 128 MMHG | RESPIRATION RATE: 20 BRPM | WEIGHT: 201 LBS | HEART RATE: 100 BPM | TEMPERATURE: 98 F | DIASTOLIC BLOOD PRESSURE: 98 MMHG | HEIGHT: 64 IN

## 2017-03-15 DIAGNOSIS — N12 PYELONEPHRITIS: Primary | ICD-10-CM

## 2017-03-15 DIAGNOSIS — N20.0 KIDNEY STONES: ICD-10-CM

## 2017-03-15 LAB
BILIRUB BLD-MCNC: NEGATIVE MG/DL
CLARITY, POC: ABNORMAL
COLOR UR: YELLOW
GLUCOSE UR STRIP-MCNC: NEGATIVE MG/DL
KETONES UR QL: NEGATIVE
LEUKOCYTE EST, POC: ABNORMAL
NITRITE UR-MCNC: NEGATIVE MG/ML
PH UR: 5.5 [PH] (ref 5–8)
PROT UR STRIP-MCNC: NEGATIVE MG/DL
RBC # UR STRIP: ABNORMAL /UL
SP GR UR: 1.03 (ref 1–1.03)
UROBILINOGEN UR QL: ABNORMAL

## 2017-03-15 PROCEDURE — 81003 URINALYSIS AUTO W/O SCOPE: CPT | Performed by: FAMILY MEDICINE

## 2017-03-15 PROCEDURE — 99495 TRANSJ CARE MGMT MOD F2F 14D: CPT | Performed by: FAMILY MEDICINE

## 2017-03-15 NOTE — PROGRESS NOTES
Subjective   Marta Amaya is a 31 y.o. female.     History of Present Illness     She was admitted for pyelonephritis to  after being ill for a week  Had sweats, chills, body aches and they diagnosed her with pyelonepohritis  Was discharged on 3/8/17    Our office called her on 3/10/17 and checked up on her    Since arriving at her home she has been doing quite well  Returned to work on 3/13/17  She gets some occasional sharp pain in her gut and was told she had some kidney stones as well  She completed her levaquin yesterday  Was also given pain medicine    No N/V, no sweats either    The following portions of the patient's history were reviewed and updated as appropriate: allergies, current medications, past family history, past medical history, past social history, past surgical history and problem list.    Review of Systems   Constitutional: Negative.    HENT: Negative.    Eyes: Negative.    Respiratory: Negative.    Cardiovascular: Negative.    Gastrointestinal: Negative.    Musculoskeletal: Negative.    Skin: Negative.    Neurological: Negative.    Psychiatric/Behavioral: Negative.    All other systems reviewed and are negative.      Objective   Physical Exam   Constitutional: She is oriented to person, place, and time. She appears well-developed and well-nourished. No distress.   HENT:   Head: Normocephalic and atraumatic.   Right Ear: Tympanic membrane, external ear and ear canal normal.   Left Ear: Tympanic membrane, external ear and ear canal normal.   Nose: Nose normal.   Mouth/Throat: Uvula is midline and oropharynx is clear and moist.   Eyes: Conjunctivae and EOM are normal.   Neck: Normal range of motion. Neck supple. No thyromegaly present.   Cardiovascular: Normal rate, regular rhythm and normal heart sounds.    No murmur heard.  Pulmonary/Chest: Effort normal and breath sounds normal. No respiratory distress.   Abdominal: Soft. Bowel sounds are normal. She exhibits no distension and no mass.  There is no tenderness. There is no CVA tenderness.   Lymphadenopathy:     She has no cervical adenopathy.   Neurological: She is alert and oriented to person, place, and time.   Skin: Skin is warm and dry.   Psychiatric: She has a normal mood and affect. Her behavior is normal. Judgment and thought content normal.   Nursing note and vitals reviewed.      Assessment/Plan   Marta was seen today for hospital follow-up.    Diagnoses and all orders for this visit:    Pyelonephritis  -     POC Urinalysis Dipstick, Automated  -     Urine Culture    Kidney stones    reviewed hospital records with pt  complete resolution of symptoms at this time, will recheck urine culture and f/u as needed.  Released to normal activity.

## 2017-03-17 LAB
BACTERIA UR CULT: NO GROWTH
BACTERIA UR CULT: NORMAL

## 2017-05-25 ENCOUNTER — OFFICE VISIT (OUTPATIENT)
Dept: FAMILY MEDICINE CLINIC | Facility: CLINIC | Age: 32
End: 2017-05-25

## 2017-05-25 VITALS
SYSTOLIC BLOOD PRESSURE: 138 MMHG | TEMPERATURE: 98.9 F | BODY MASS INDEX: 34.67 KG/M2 | DIASTOLIC BLOOD PRESSURE: 82 MMHG | WEIGHT: 202 LBS | HEART RATE: 109 BPM | OXYGEN SATURATION: 99 %

## 2017-05-25 DIAGNOSIS — H66.002 ACUTE SUPPURATIVE OTITIS MEDIA OF LEFT EAR WITHOUT SPONTANEOUS RUPTURE OF TYMPANIC MEMBRANE, RECURRENCE NOT SPECIFIED: ICD-10-CM

## 2017-05-25 DIAGNOSIS — J20.9 ACUTE BRONCHITIS, UNSPECIFIED ORGANISM: Primary | ICD-10-CM

## 2017-05-25 DIAGNOSIS — R52 BODY ACHES: ICD-10-CM

## 2017-05-25 LAB
EXPIRATION DATE: NORMAL
FLUAV AG NPH QL: NORMAL
FLUBV AG NPH QL: NORMAL
INTERNAL CONTROL: NORMAL
Lab: NORMAL

## 2017-05-25 PROCEDURE — 99213 OFFICE O/P EST LOW 20 MIN: CPT | Performed by: NURSE PRACTITIONER

## 2017-05-25 PROCEDURE — 87804 INFLUENZA ASSAY W/OPTIC: CPT | Performed by: NURSE PRACTITIONER

## 2017-05-25 RX ORDER — GUAIFENESIN AND CODEINE PHOSPHATE 100; 10 MG/5ML; MG/5ML
5 SOLUTION ORAL 3 TIMES DAILY PRN
Qty: 120 EACH | Refills: 0 | OUTPATIENT
Start: 2017-05-25 | End: 2020-01-16

## 2017-05-25 RX ORDER — METHYLPREDNISOLONE 4 MG/1
TABLET ORAL
Qty: 21 TABLET | Refills: 0 | OUTPATIENT
Start: 2017-05-25 | End: 2020-01-16

## 2017-05-25 RX ORDER — AZITHROMYCIN 250 MG/1
TABLET, FILM COATED ORAL
Qty: 6 TABLET | Refills: 0 | OUTPATIENT
Start: 2017-05-25 | End: 2020-01-16

## 2020-01-16 ENCOUNTER — HOSPITAL ENCOUNTER (EMERGENCY)
Facility: HOSPITAL | Age: 35
Discharge: HOME | End: 2020-01-16
Attending: EMERGENCY MEDICINE | Admitting: EMERGENCY MEDICINE

## 2020-01-16 ENCOUNTER — APPOINTMENT (OUTPATIENT)
Dept: GENERAL RADIOLOGY | Facility: HOSPITAL | Age: 35
End: 2020-01-16

## 2020-01-16 VITALS
HEART RATE: 87 BPM | OXYGEN SATURATION: 97 % | TEMPERATURE: 98.7 F | BODY MASS INDEX: 37.21 KG/M2 | HEIGHT: 63 IN | RESPIRATION RATE: 18 BRPM | SYSTOLIC BLOOD PRESSURE: 103 MMHG | WEIGHT: 210 LBS | DIASTOLIC BLOOD PRESSURE: 76 MMHG

## 2020-01-16 DIAGNOSIS — J11.1 INFLUENZA: Primary | ICD-10-CM

## 2020-01-16 LAB
ALBUMIN SERPL-MCNC: 3.3 G/DL (ref 3.5–5.2)
ALBUMIN/GLOB SERPL: 1.4 G/DL
ALP SERPL-CCNC: 70 U/L (ref 39–117)
ALT SERPL W P-5'-P-CCNC: 19 U/L (ref 1–33)
ANION GAP SERPL CALCULATED.3IONS-SCNC: 10 MMOL/L (ref 5–15)
AST SERPL-CCNC: 15 U/L (ref 1–32)
BASOPHILS # BLD AUTO: 0.01 10*3/MM3 (ref 0–0.2)
BASOPHILS NFR BLD AUTO: 0.2 % (ref 0–1.5)
BILIRUB SERPL-MCNC: <0.2 MG/DL (ref 0.2–1.2)
BUN BLD-MCNC: 8 MG/DL (ref 6–20)
BUN/CREAT SERPL: 13.3 (ref 7–25)
CALCIUM SPEC-SCNC: 7.8 MG/DL (ref 8.6–10.5)
CHLORIDE SERPL-SCNC: 107 MMOL/L (ref 98–107)
CO2 SERPL-SCNC: 22 MMOL/L (ref 22–29)
CREAT BLD-MCNC: 0.6 MG/DL (ref 0.57–1)
DEPRECATED RDW RBC AUTO: 42.8 FL (ref 37–54)
EOSINOPHIL # BLD AUTO: 0.15 10*3/MM3 (ref 0–0.4)
EOSINOPHIL NFR BLD AUTO: 3.4 % (ref 0.3–6.2)
ERYTHROCYTE [DISTWIDTH] IN BLOOD BY AUTOMATED COUNT: 12.2 % (ref 12.3–15.4)
FLUAV AG NPH QL: NEGATIVE
FLUBV AG NPH QL IA: POSITIVE
GFR SERPL CREATININE-BSD FRML MDRD: 115 ML/MIN/1.73
GLOBULIN UR ELPH-MCNC: 2.4 GM/DL
GLUCOSE BLD-MCNC: 105 MG/DL (ref 65–99)
HCT VFR BLD AUTO: 37.6 % (ref 34–46.6)
HGB BLD-MCNC: 12.7 G/DL (ref 12–15.9)
IMM GRANULOCYTES # BLD AUTO: 0.01 10*3/MM3 (ref 0–0.05)
IMM GRANULOCYTES NFR BLD AUTO: 0.2 % (ref 0–0.5)
LYMPHOCYTES # BLD AUTO: 1.25 10*3/MM3 (ref 0.7–3.1)
LYMPHOCYTES NFR BLD AUTO: 28.7 % (ref 19.6–45.3)
MCH RBC QN AUTO: 32.2 PG (ref 26.6–33)
MCHC RBC AUTO-ENTMCNC: 33.8 G/DL (ref 31.5–35.7)
MCV RBC AUTO: 95.2 FL (ref 79–97)
MONOCYTES # BLD AUTO: 0.33 10*3/MM3 (ref 0.1–0.9)
MONOCYTES NFR BLD AUTO: 7.6 % (ref 5–12)
NEUTROPHILS # BLD AUTO: 2.61 10*3/MM3 (ref 1.7–7)
NEUTROPHILS NFR BLD AUTO: 59.9 % (ref 42.7–76)
NRBC BLD AUTO-RTO: 0 /100 WBC (ref 0–0.2)
PLATELET # BLD AUTO: 160 10*3/MM3 (ref 140–450)
PMV BLD AUTO: 11 FL (ref 6–12)
POTASSIUM BLD-SCNC: 3.5 MMOL/L (ref 3.5–5.2)
PROT SERPL-MCNC: 5.7 G/DL (ref 6–8.5)
RBC # BLD AUTO: 3.95 10*6/MM3 (ref 3.77–5.28)
SODIUM BLD-SCNC: 139 MMOL/L (ref 136–145)
WBC NRBC COR # BLD: 4.36 10*3/MM3 (ref 3.4–10.8)

## 2020-01-16 PROCEDURE — 87804 INFLUENZA ASSAY W/OPTIC: CPT | Performed by: PHYSICIAN ASSISTANT

## 2020-01-16 PROCEDURE — 99283 EMERGENCY DEPT VISIT LOW MDM: CPT

## 2020-01-16 PROCEDURE — 85025 COMPLETE CBC W/AUTO DIFF WBC: CPT | Performed by: PHYSICIAN ASSISTANT

## 2020-01-16 PROCEDURE — 80053 COMPREHEN METABOLIC PANEL: CPT | Performed by: PHYSICIAN ASSISTANT

## 2020-01-16 PROCEDURE — 94640 AIRWAY INHALATION TREATMENT: CPT

## 2020-01-16 PROCEDURE — 71046 X-RAY EXAM CHEST 2 VIEWS: CPT

## 2020-01-16 RX ORDER — ALBUTEROL SULFATE 2.5 MG/3ML
2.5 SOLUTION RESPIRATORY (INHALATION) ONCE
Status: COMPLETED | OUTPATIENT
Start: 2020-01-16 | End: 2020-01-16

## 2020-01-16 RX ORDER — IBUPROFEN 800 MG/1
800 TABLET ORAL EVERY 6 HOURS PRN
COMMUNITY
End: 2020-01-16

## 2020-01-16 RX ORDER — OSELTAMIVIR PHOSPHATE 75 MG/1
75 CAPSULE ORAL 2 TIMES DAILY
COMMUNITY
End: 2020-11-14

## 2020-01-16 RX ORDER — ALBUTEROL SULFATE 90 UG/1
2 AEROSOL, METERED RESPIRATORY (INHALATION) EVERY 6 HOURS PRN
Qty: 8 G | Refills: 0 | Status: SHIPPED | OUTPATIENT
Start: 2020-01-16

## 2020-01-16 RX ORDER — ACETAMINOPHEN 500 MG
1000 TABLET ORAL EVERY 6 HOURS PRN
COMMUNITY

## 2020-01-16 RX ORDER — BENZONATATE 100 MG/1
200 CAPSULE ORAL 3 TIMES DAILY PRN
COMMUNITY

## 2020-01-16 RX ORDER — INHALER, ASSIST DEVICES
SPACER (EA) MISCELLANEOUS
Qty: 1 EACH | Refills: 0 | Status: SHIPPED | OUTPATIENT
Start: 2020-01-16 | End: 2021-01-15

## 2020-01-16 RX ORDER — DEXTROMETHORPHAN HYDROBROMIDE AND PROMETHAZINE HYDROCHLORIDE 15; 6.25 MG/5ML; MG/5ML
5 SYRUP ORAL 4 TIMES DAILY PRN
Qty: 118 ML | Refills: 0 | OUTPATIENT
Start: 2020-01-16 | End: 2020-11-14

## 2020-01-16 RX ADMIN — ALBUTEROL SULFATE 2.5 MG: 2.5 SOLUTION RESPIRATORY (INHALATION) at 09:41

## 2020-01-16 RX ADMIN — SODIUM CHLORIDE 1000 ML: 9 INJECTION, SOLUTION INTRAVENOUS at 09:53

## 2020-01-16 NOTE — ED PROVIDER NOTES
Subjective   Ms. Amaya is a 34-year-old female that presents to the emergency department today with flulike symptoms.  She states that her son was diagnosed with influenza about 4 days ago.  She was diagnosed with influenza about 2 days ago.  She states she is feeling no better she is on Tamiflu.  She is continued to have cough body aches and just general malaise.  She states that she has had no nausea or vomiting associated with this she had no diarrhea associated with this.  She is her cough is been productive with clear to white discharge.  She states that she does not have a history of asthma.  She has felt she has been wheezing just a little bit.  Only thing seems to exacerbate this is when she gets in the coughing fits.  She states nothing seems to alleviate her discomfort she is been trying Tylenol Motrin without any relief.      History provided by:  Patient and significant other   used: No    Influenza   Presenting symptoms: cough, fatigue, headache, myalgias and rhinorrhea    Presenting symptoms: no nausea and no vomiting    Severity:  Severe  Onset quality:  Sudden  Duration:  4 days  Progression:  Worsening  Chronicity:  New  Relieved by:  Nothing  Worsened by:  Nothing  Ineffective treatments: Tamiflu.  Associated symptoms: chills and nasal congestion    Associated symptoms: no ear pain, no neck stiffness and no syncope        Review of Systems   Constitutional: Positive for chills and fatigue.   HENT: Positive for congestion and rhinorrhea. Negative for ear pain.    Respiratory: Positive for cough. Negative for wheezing.    Cardiovascular: Negative for chest pain and palpitations.   Gastrointestinal: Negative for nausea and vomiting.   Musculoskeletal: Positive for myalgias. Negative for back pain and neck stiffness.   Skin: Negative for pallor and rash.   Neurological: Positive for headaches.   Psychiatric/Behavioral: Negative.    All other systems reviewed and are  negative.      Past Medical History:   Diagnosis Date   • Cellulitis    • Kidney stones    • Obesity    • Skin lesion of breast    • URI (upper respiratory infection)        No Known Allergies    Past Surgical History:   Procedure Laterality Date   • APPENDECTOMY     • TONSILLECTOMY     • TUBAL ABDOMINAL LIGATION         Family History   Problem Relation Age of Onset   • No Known Problems Mother    • Hypertension Father    • Diabetes Other    • Heart disease Other    • Hypertension Other        Social History     Socioeconomic History   • Marital status:      Spouse name: Not on file   • Number of children: Not on file   • Years of education: Not on file   • Highest education level: Not on file   Occupational History   • Occupation:  facility   Tobacco Use   • Smoking status: Former Smoker     Years: 10.00     Types: Cigarettes     Last attempt to quit: 2016     Years since quittin.0   • Smokeless tobacco: Never Used   Substance and Sexual Activity   • Alcohol use: No   • Drug use: No   • Sexual activity: Yes     Partners: Male     Birth control/protection: Surgical     Comment:            Objective   Physical Exam   Constitutional: She is oriented to person, place, and time. She appears well-developed and well-nourished. No distress.   Congested coughing appears miserable but nontoxic   HENT:   Head: Normocephalic and atraumatic.   Copious amounts of clear drainage from both nares no foreign body   Eyes: Conjunctivae are normal. No scleral icterus.   Neck: Normal range of motion. Neck supple.   Cardiovascular: Normal rate, regular rhythm, normal heart sounds and intact distal pulses.   No murmur heard.  Pulmonary/Chest: Effort normal. No respiratory distress. She has wheezes.   Abdominal: Soft. Bowel sounds are normal. There is no tenderness.   Musculoskeletal: Normal range of motion.   Neurological: She is alert and oriented to person, place, and time.   Skin: Skin is warm and dry. She  is not diaphoretic.   Psychiatric: She has a normal mood and affect. Her behavior is normal.   Nursing note and vitals reviewed.      Procedures           ED Course                 Recent Results (from the past 24 hour(s))   Influenza Antigen, Rapid - Swab, Nasopharynx    Collection Time: 01/16/20  9:43 AM   Result Value Ref Range    Influenza A Ag, EIA Negative Negative    Influenza B Ag, EIA Positive (A) Negative   CBC Auto Differential    Collection Time: 01/16/20  9:49 AM   Result Value Ref Range    WBC 4.36 3.40 - 10.80 10*3/mm3    RBC 3.95 3.77 - 5.28 10*6/mm3    Hemoglobin 12.7 12.0 - 15.9 g/dL    Hematocrit 37.6 34.0 - 46.6 %    MCV 95.2 79.0 - 97.0 fL    MCH 32.2 26.6 - 33.0 pg    MCHC 33.8 31.5 - 35.7 g/dL    RDW 12.2 (L) 12.3 - 15.4 %    RDW-SD 42.8 37.0 - 54.0 fl    MPV 11.0 6.0 - 12.0 fL    Platelets 160 140 - 450 10*3/mm3    Neutrophil % 59.9 42.7 - 76.0 %    Lymphocyte % 28.7 19.6 - 45.3 %    Monocyte % 7.6 5.0 - 12.0 %    Eosinophil % 3.4 0.3 - 6.2 %    Basophil % 0.2 0.0 - 1.5 %    Immature Grans % 0.2 0.0 - 0.5 %    Neutrophils, Absolute 2.61 1.70 - 7.00 10*3/mm3    Lymphocytes, Absolute 1.25 0.70 - 3.10 10*3/mm3    Monocytes, Absolute 0.33 0.10 - 0.90 10*3/mm3    Eosinophils, Absolute 0.15 0.00 - 0.40 10*3/mm3    Basophils, Absolute 0.01 0.00 - 0.20 10*3/mm3    Immature Grans, Absolute 0.01 0.00 - 0.05 10*3/mm3    nRBC 0.0 0.0 - 0.2 /100 WBC   Comprehensive Metabolic Panel    Collection Time: 01/16/20 10:37 AM   Result Value Ref Range    Glucose 105 (H) 65 - 99 mg/dL    BUN 8 6 - 20 mg/dL    Creatinine 0.60 0.57 - 1.00 mg/dL    Sodium 139 136 - 145 mmol/L    Potassium 3.5 3.5 - 5.2 mmol/L    Chloride 107 98 - 107 mmol/L    CO2 22.0 22.0 - 29.0 mmol/L    Calcium 7.8 (L) 8.6 - 10.5 mg/dL    Total Protein 5.7 (L) 6.0 - 8.5 g/dL    Albumin 3.30 (L) 3.50 - 5.20 g/dL    ALT (SGPT) 19 1 - 33 U/L    AST (SGOT) 15 1 - 32 U/L    Alkaline Phosphatase 70 39 - 117 U/L    Total Bilirubin <0.2 (L) 0.2 - 1.2  "mg/dL    eGFR Non African Amer 115 >60 mL/min/1.73    Globulin 2.4 gm/dL    A/G Ratio 1.4 g/dL    BUN/Creatinine Ratio 13.3 7.0 - 25.0    Anion Gap 10.0 5.0 - 15.0 mmol/L     Note: In addition to lab results from this visit, the labs listed above may include labs taken at another facility or during a different encounter within the last 24 hours. Please correlate lab times with ED admission and discharge times for further clarification of the services performed during this visit.    XR Chest 2 View   Final Result   Mild prominence of the perihilar lung markings may represent   peribronchial inflammatory process such as bronchitis and/or reactive   airways disease without focal consolidation.       D:  01/16/2020   E:  01/16/2020       This report was finalized on 1/16/2020 10:33 AM by Dr. Madi Borja.            Vitals:    01/16/20 0857 01/16/20 0941   BP: 126/72    BP Location: Left arm    Patient Position: Sitting    Pulse: 102 84   Resp: 14 20   Temp: 99.5 °F (37.5 °C)    TempSrc: Oral    SpO2: 96%    Weight: 95.3 kg (210 lb)    Height: 160 cm (63\")      Medications   sodium chloride 0.9 % bolus 1,000 mL (1,000 mL Intravenous New Bag 1/16/20 0953)   albuterol (PROVENTIL) nebulizer solution 0.083% 2.5 mg/3mL (2.5 mg Nebulization Given 1/16/20 0941)     ECG/EMG Results (last 24 hours)     ** No results found for the last 24 hours. **        No orders to display             [place] Coma Scale Score: 15                          MDM      Final diagnoses:   Shiva Acosta PA  01/16/20 1633    "

## 2020-06-15 ENCOUNTER — APPOINTMENT (OUTPATIENT)
Dept: CT IMAGING | Facility: HOSPITAL | Age: 35
End: 2020-06-15

## 2020-06-15 ENCOUNTER — HOSPITAL ENCOUNTER (EMERGENCY)
Facility: HOSPITAL | Age: 35
Discharge: HOME | End: 2020-06-15
Attending: EMERGENCY MEDICINE | Admitting: EMERGENCY MEDICINE

## 2020-06-15 VITALS
WEIGHT: 200 LBS | RESPIRATION RATE: 16 BRPM | TEMPERATURE: 98.6 F | HEIGHT: 63 IN | BODY MASS INDEX: 35.44 KG/M2 | OXYGEN SATURATION: 93 % | SYSTOLIC BLOOD PRESSURE: 109 MMHG | DIASTOLIC BLOOD PRESSURE: 62 MMHG | HEART RATE: 80 BPM

## 2020-06-15 DIAGNOSIS — N12 PYELONEPHRITIS: Primary | ICD-10-CM

## 2020-06-15 LAB
ALBUMIN SERPL-MCNC: 4 G/DL (ref 3.5–5.2)
ALBUMIN/GLOB SERPL: 1.4 G/DL
ALP SERPL-CCNC: 84 U/L (ref 39–117)
ALT SERPL W P-5'-P-CCNC: 19 U/L (ref 1–33)
ANION GAP SERPL CALCULATED.3IONS-SCNC: 12 MMOL/L (ref 5–15)
AST SERPL-CCNC: 19 U/L (ref 1–32)
B-HCG UR QL: NEGATIVE
BACTERIA UR QL AUTO: ABNORMAL /HPF
BASOPHILS # BLD AUTO: 0.05 10*3/MM3 (ref 0–0.2)
BASOPHILS NFR BLD AUTO: 0.3 % (ref 0–1.5)
BILIRUB SERPL-MCNC: 0.6 MG/DL (ref 0.2–1.2)
BILIRUB UR QL STRIP: ABNORMAL
BUN BLD-MCNC: 7 MG/DL (ref 6–20)
BUN/CREAT SERPL: 8.5 (ref 7–25)
CALCIUM SPEC-SCNC: 8.8 MG/DL (ref 8.6–10.5)
CHLORIDE SERPL-SCNC: 102 MMOL/L (ref 98–107)
CLARITY UR: ABNORMAL
CO2 SERPL-SCNC: 21 MMOL/L (ref 22–29)
COLOR UR: ABNORMAL
CREAT BLD-MCNC: 0.82 MG/DL (ref 0.57–1)
DEPRECATED RDW RBC AUTO: 45.9 FL (ref 37–54)
EOSINOPHIL # BLD AUTO: 0.11 10*3/MM3 (ref 0–0.4)
EOSINOPHIL NFR BLD AUTO: 0.7 % (ref 0.3–6.2)
ERYTHROCYTE [DISTWIDTH] IN BLOOD BY AUTOMATED COUNT: 13 % (ref 12.3–15.4)
GFR SERPL CREATININE-BSD FRML MDRD: 80 ML/MIN/1.73
GLOBULIN UR ELPH-MCNC: 2.9 GM/DL
GLUCOSE BLD-MCNC: 126 MG/DL (ref 65–99)
GLUCOSE UR STRIP-MCNC: NEGATIVE MG/DL
HCT VFR BLD AUTO: 38.8 % (ref 34–46.6)
HGB BLD-MCNC: 12.9 G/DL (ref 12–15.9)
HGB UR QL STRIP.AUTO: ABNORMAL
HOLD SPECIMEN: NORMAL
HOLD SPECIMEN: NORMAL
HYALINE CASTS UR QL AUTO: ABNORMAL /LPF
IMM GRANULOCYTES # BLD AUTO: 0.06 10*3/MM3 (ref 0–0.05)
IMM GRANULOCYTES NFR BLD AUTO: 0.4 % (ref 0–0.5)
INTERNAL NEGATIVE CONTROL: NEGATIVE
INTERNAL POSITIVE CONTROL: POSITIVE
KETONES UR QL STRIP: ABNORMAL
LEUKOCYTE ESTERASE UR QL STRIP.AUTO: ABNORMAL
LIPASE SERPL-CCNC: 13 U/L (ref 13–60)
LYMPHOCYTES # BLD AUTO: 1.33 10*3/MM3 (ref 0.7–3.1)
LYMPHOCYTES NFR BLD AUTO: 8.5 % (ref 19.6–45.3)
Lab: NORMAL
MCH RBC QN AUTO: 31.9 PG (ref 26.6–33)
MCHC RBC AUTO-ENTMCNC: 33.2 G/DL (ref 31.5–35.7)
MCV RBC AUTO: 96 FL (ref 79–97)
MONOCYTES # BLD AUTO: 0.84 10*3/MM3 (ref 0.1–0.9)
MONOCYTES NFR BLD AUTO: 5.4 % (ref 5–12)
NEUTROPHILS # BLD AUTO: 13.29 10*3/MM3 (ref 1.7–7)
NEUTROPHILS NFR BLD AUTO: 84.7 % (ref 42.7–76)
NITRITE UR QL STRIP: POSITIVE
NRBC BLD AUTO-RTO: 0 /100 WBC (ref 0–0.2)
PH UR STRIP.AUTO: <=5 [PH] (ref 5–8)
PLATELET # BLD AUTO: 195 10*3/MM3 (ref 140–450)
PMV BLD AUTO: 11.2 FL (ref 6–12)
POTASSIUM BLD-SCNC: 3.7 MMOL/L (ref 3.5–5.2)
PROT SERPL-MCNC: 6.9 G/DL (ref 6–8.5)
PROT UR QL STRIP: ABNORMAL
RBC # BLD AUTO: 4.04 10*6/MM3 (ref 3.77–5.28)
RBC # UR: ABNORMAL /HPF
REF LAB TEST METHOD: ABNORMAL
SODIUM BLD-SCNC: 135 MMOL/L (ref 136–145)
SP GR UR STRIP: 1.02 (ref 1–1.03)
SQUAMOUS #/AREA URNS HPF: ABNORMAL /HPF
UROBILINOGEN UR QL STRIP: ABNORMAL
WBC NRBC COR # BLD: 15.68 10*3/MM3 (ref 3.4–10.8)
WBC UR QL AUTO: ABNORMAL /HPF
WHOLE BLOOD HOLD SPECIMEN: NORMAL
WHOLE BLOOD HOLD SPECIMEN: NORMAL

## 2020-06-15 PROCEDURE — 25010000002 KETOROLAC TROMETHAMINE PER 15 MG: Performed by: EMERGENCY MEDICINE

## 2020-06-15 PROCEDURE — 25010000002 HYDROMORPHONE PER 4 MG: Performed by: EMERGENCY MEDICINE

## 2020-06-15 PROCEDURE — 25010000002 ONDANSETRON PER 1 MG: Performed by: EMERGENCY MEDICINE

## 2020-06-15 PROCEDURE — 80053 COMPREHEN METABOLIC PANEL: CPT

## 2020-06-15 PROCEDURE — 25010000002 CEFTRIAXONE PER 250 MG: Performed by: EMERGENCY MEDICINE

## 2020-06-15 PROCEDURE — 25010000002 IOPAMIDOL 61 % SOLUTION: Performed by: EMERGENCY MEDICINE

## 2020-06-15 PROCEDURE — 81001 URINALYSIS AUTO W/SCOPE: CPT | Performed by: EMERGENCY MEDICINE

## 2020-06-15 PROCEDURE — 96376 TX/PRO/DX INJ SAME DRUG ADON: CPT

## 2020-06-15 PROCEDURE — 85025 COMPLETE CBC W/AUTO DIFF WBC: CPT

## 2020-06-15 PROCEDURE — 74177 CT ABD & PELVIS W/CONTRAST: CPT

## 2020-06-15 PROCEDURE — 87086 URINE CULTURE/COLONY COUNT: CPT | Performed by: PHYSICIAN ASSISTANT

## 2020-06-15 PROCEDURE — 96375 TX/PRO/DX INJ NEW DRUG ADDON: CPT

## 2020-06-15 PROCEDURE — 83690 ASSAY OF LIPASE: CPT

## 2020-06-15 PROCEDURE — 99283 EMERGENCY DEPT VISIT LOW MDM: CPT

## 2020-06-15 PROCEDURE — 87088 URINE BACTERIA CULTURE: CPT | Performed by: PHYSICIAN ASSISTANT

## 2020-06-15 PROCEDURE — 87186 SC STD MICRODIL/AGAR DIL: CPT | Performed by: PHYSICIAN ASSISTANT

## 2020-06-15 PROCEDURE — 96374 THER/PROPH/DIAG INJ IV PUSH: CPT

## 2020-06-15 PROCEDURE — 81025 URINE PREGNANCY TEST: CPT | Performed by: EMERGENCY MEDICINE

## 2020-06-15 RX ORDER — ONDANSETRON 2 MG/ML
4 INJECTION INTRAMUSCULAR; INTRAVENOUS ONCE
Status: COMPLETED | OUTPATIENT
Start: 2020-06-15 | End: 2020-06-15

## 2020-06-15 RX ORDER — PHENAZOPYRIDINE HYDROCHLORIDE 100 MG/1
200 TABLET, FILM COATED ORAL ONCE
Status: COMPLETED | OUTPATIENT
Start: 2020-06-15 | End: 2020-06-15

## 2020-06-15 RX ORDER — HYDROMORPHONE HYDROCHLORIDE 1 MG/ML
0.5 INJECTION, SOLUTION INTRAMUSCULAR; INTRAVENOUS; SUBCUTANEOUS ONCE
Status: COMPLETED | OUTPATIENT
Start: 2020-06-15 | End: 2020-06-15

## 2020-06-15 RX ORDER — CIPROFLOXACIN 500 MG/1
500 TABLET, FILM COATED ORAL 2 TIMES DAILY
Qty: 20 TABLET | Refills: 0 | Status: SHIPPED | OUTPATIENT
Start: 2020-06-15 | End: 2020-06-25

## 2020-06-15 RX ORDER — KETOROLAC TROMETHAMINE 15 MG/ML
15 INJECTION, SOLUTION INTRAMUSCULAR; INTRAVENOUS ONCE
Status: COMPLETED | OUTPATIENT
Start: 2020-06-15 | End: 2020-06-15

## 2020-06-15 RX ORDER — HYDROCODONE BITARTRATE AND ACETAMINOPHEN 7.5; 325 MG/1; MG/1
1 TABLET ORAL EVERY 6 HOURS PRN
Qty: 15 TABLET | Refills: 0 | OUTPATIENT
Start: 2020-06-15 | End: 2020-11-14

## 2020-06-15 RX ORDER — PHENAZOPYRIDINE HYDROCHLORIDE 200 MG/1
200 TABLET, FILM COATED ORAL 3 TIMES DAILY PRN
Qty: 6 TABLET | Refills: 0 | Status: SHIPPED | OUTPATIENT
Start: 2020-06-15 | End: 2020-06-17

## 2020-06-15 RX ORDER — ONDANSETRON 4 MG/1
4 TABLET, ORALLY DISINTEGRATING ORAL EVERY 6 HOURS PRN
Qty: 15 TABLET | Refills: 0 | OUTPATIENT
Start: 2020-06-15 | End: 2020-11-14

## 2020-06-15 RX ORDER — SODIUM CHLORIDE 0.9 % (FLUSH) 0.9 %
10 SYRINGE (ML) INJECTION AS NEEDED
Status: DISCONTINUED | OUTPATIENT
Start: 2020-06-15 | End: 2020-06-16 | Stop reason: HOSPADM

## 2020-06-15 RX ADMIN — ONDANSETRON 4 MG: 2 INJECTION INTRAMUSCULAR; INTRAVENOUS at 19:07

## 2020-06-15 RX ADMIN — SODIUM CHLORIDE 1000 ML: 9 INJECTION, SOLUTION INTRAVENOUS at 19:34

## 2020-06-15 RX ADMIN — PHENAZOPYRIDINE HYDROCHLORIDE 200 MG: 100 TABLET ORAL at 21:44

## 2020-06-15 RX ADMIN — SODIUM CHLORIDE 2 G: 900 INJECTION INTRAVENOUS at 21:45

## 2020-06-15 RX ADMIN — HYDROMORPHONE HYDROCHLORIDE 0.5 MG: 1 INJECTION, SOLUTION INTRAMUSCULAR; INTRAVENOUS; SUBCUTANEOUS at 19:08

## 2020-06-15 RX ADMIN — KETOROLAC TROMETHAMINE 15 MG: 15 INJECTION, SOLUTION INTRAMUSCULAR; INTRAVENOUS at 19:09

## 2020-06-15 RX ADMIN — IOPAMIDOL 95 ML: 612 INJECTION, SOLUTION INTRAVENOUS at 20:56

## 2020-06-15 RX ADMIN — SODIUM CHLORIDE 1000 ML: 9 INJECTION, SOLUTION INTRAVENOUS at 19:06

## 2020-06-15 RX ADMIN — HYDROMORPHONE HYDROCHLORIDE 0.5 MG: 1 INJECTION, SOLUTION INTRAMUSCULAR; INTRAVENOUS; SUBCUTANEOUS at 21:45

## 2020-06-16 NOTE — ED PROVIDER NOTES
Subjective   Pt is a 36 yo female presenting to ED with complaints of right flank pain. Pt reports sharp and aching pain to right flank that radiates around to right lower abdomen that began yesterday morning and has gradually worsened. She complains of increased urinary frequency and burning. She has tried Azo without relief. She complains of chills and nausea but no known fevers. She denies cough, SOB, vomiting or diarrhea. She reports prior hx of frequent UTIs as well as kidney stones. She denies recent antibiotics or known sick contacts. Pt hasn't taken anything else for pain or chills at home today. Past abdominal surgical hx includes Appendectomy and tubal ligation. PMHx significant for Kidney stones and Frequent UTIs. She denies tobacco, drug or ETOH use.       History provided by:  Medical records and patient  Flank Pain   Pain location:  R flank  Pain quality: aching and sharp    Pain radiates to:  RLQ  Pain severity:  Moderate  Onset quality:  Gradual  Duration:  1 day  Timing:  Constant  Progression:  Worsening  Chronicity:  Recurrent  Context: not recent illness, not recent travel, not sick contacts, not suspicious food intake and not trauma    Relieved by:  Nothing  Worsened by:  Nothing  Ineffective treatments: azo.  Associated symptoms: chills, dysuria, fatigue and nausea    Associated symptoms: no chest pain, no constipation, no cough, no diarrhea, no fever, no hematuria, no shortness of breath, no vaginal bleeding, no vaginal discharge and no vomiting    Risk factors: multiple surgeries and obesity    Risk factors: no alcohol abuse and not pregnant        Review of Systems   Constitutional: Positive for chills and fatigue. Negative for fever.   Respiratory: Negative for cough and shortness of breath.    Cardiovascular: Negative for chest pain.   Gastrointestinal: Positive for abdominal pain and nausea. Negative for constipation, diarrhea and vomiting.   Genitourinary: Positive for dysuria, flank  pain, frequency and urgency. Negative for hematuria, vaginal bleeding and vaginal discharge.   Neurological: Negative for dizziness and weakness.   All other systems reviewed and are negative.      Past Medical History:   Diagnosis Date   • Cellulitis    • Kidney stones    • Obesity    • Skin lesion of breast    • URI (upper respiratory infection)        No Known Allergies    Past Surgical History:   Procedure Laterality Date   • APPENDECTOMY     • TONSILLECTOMY     • TUBAL ABDOMINAL LIGATION         Family History   Problem Relation Age of Onset   • No Known Problems Mother    • Hypertension Father    • Diabetes Other    • Heart disease Other    • Hypertension Other        Social History     Socioeconomic History   • Marital status:      Spouse name: Not on file   • Number of children: Not on file   • Years of education: Not on file   • Highest education level: Not on file   Occupational History   • Occupation:  facility   Tobacco Use   • Smoking status: Former Smoker     Years: 10.00     Types: Cigarettes     Last attempt to quit: 2016     Years since quittin.4   • Smokeless tobacco: Never Used   Substance and Sexual Activity   • Alcohol use: No   • Drug use: No   • Sexual activity: Yes     Partners: Male     Birth control/protection: Surgical     Comment:            Objective   Physical Exam   Constitutional: She is oriented to person, place, and time. Vital signs are normal. She appears well-developed.   HENT:   Head: Atraumatic.   Nose: Nose normal.   Mouth/Throat: Mucous membranes are normal.   Eyes: Pupils are equal, round, and reactive to light. Conjunctivae, EOM and lids are normal.   Neck: Normal range of motion. Neck supple.   Cardiovascular: Normal rate, regular rhythm and normal heart sounds.   Pulmonary/Chest: Effort normal and breath sounds normal. She has no wheezes.   Abdominal: Soft. She exhibits no distension. There is tenderness in the right lower quadrant and  suprapubic area. There is CVA tenderness (right). There is no rebound and no guarding.   Musculoskeletal: Normal range of motion. She exhibits no edema or tenderness.   Neurological: She is alert and oriented to person, place, and time. No sensory deficit.   Skin: Skin is warm and dry. No rash noted. No erythema.   Psychiatric: She has a normal mood and affect. Her speech is normal and behavior is normal.   Nursing note and vitals reviewed.      Procedures           ED Course  ED Course as of Edward 15 2130   Mon Edward 15, 2020   1804 WBC(!): 15.68 [RT]   1805 Glucose(!): 126 [RT]   2022 Nitrite, UA(!): Positive [RT]   2022 Leukocytes, UA(!): Moderate (2+) [RT]   2022 WBC, UA(!): Too Numerous to Count [RT]   2023 Bacteria, UA(!): 4+ [RT]   2118 BRAEDEN query complete. Treatment plan to include limited course of prescribed controlled substance. Risks including addiction, benefits, and alternatives presented to patient.     #[id number]    [KP]   2125 Discussed patient with Dr. Burk who is agreeable with ED course and discharge.     [RT]   2127 Re-examined patient several times in ED. Pt resting and feeling better after meds/fluids. Discussed results and tx plan. Pt wanting to go home. Will give dose of Rocephin in ED and discharge home on Cipro. Short course of Norco, Zofran and Pyridium also given. Discussed worsening sx to return to ED. Pt and  agreeable with plan.     [RT]      ED Course User Index  [KP] Iona Mg  [RT] Tashia Burleson PA      Recent Results (from the past 24 hour(s))   Comprehensive Metabolic Panel    Collection Time: 06/15/20  4:01 PM   Result Value Ref Range    Glucose 126 (H) 65 - 99 mg/dL    BUN 7 6 - 20 mg/dL    Creatinine 0.82 0.57 - 1.00 mg/dL    Sodium 135 (L) 136 - 145 mmol/L    Potassium 3.7 3.5 - 5.2 mmol/L    Chloride 102 98 - 107 mmol/L    CO2 21.0 (L) 22.0 - 29.0 mmol/L    Calcium 8.8 8.6 - 10.5 mg/dL    Total Protein 6.9 6.0 - 8.5 g/dL    Albumin 4.00 3.50 - 5.20 g/dL     ALT (SGPT) 19 1 - 33 U/L    AST (SGOT) 19 1 - 32 U/L    Alkaline Phosphatase 84 39 - 117 U/L    Total Bilirubin 0.6 0.2 - 1.2 mg/dL    eGFR Non African Amer 80 >60 mL/min/1.73    Globulin 2.9 gm/dL    A/G Ratio 1.4 g/dL    BUN/Creatinine Ratio 8.5 7.0 - 25.0    Anion Gap 12.0 5.0 - 15.0 mmol/L   Lipase    Collection Time: 06/15/20  4:01 PM   Result Value Ref Range    Lipase 13 13 - 60 U/L   Light Blue Top    Collection Time: 06/15/20  4:01 PM   Result Value Ref Range    Extra Tube hold for add-on    Green Top (Gel)    Collection Time: 06/15/20  4:01 PM   Result Value Ref Range    Extra Tube Hold for add-ons.    Lavender Top    Collection Time: 06/15/20  4:01 PM   Result Value Ref Range    Extra Tube hold for add-on    Gold Top - SST    Collection Time: 06/15/20  4:01 PM   Result Value Ref Range    Extra Tube Hold for add-ons.    CBC Auto Differential    Collection Time: 06/15/20  4:01 PM   Result Value Ref Range    WBC 15.68 (H) 3.40 - 10.80 10*3/mm3    RBC 4.04 3.77 - 5.28 10*6/mm3    Hemoglobin 12.9 12.0 - 15.9 g/dL    Hematocrit 38.8 34.0 - 46.6 %    MCV 96.0 79.0 - 97.0 fL    MCH 31.9 26.6 - 33.0 pg    MCHC 33.2 31.5 - 35.7 g/dL    RDW 13.0 12.3 - 15.4 %    RDW-SD 45.9 37.0 - 54.0 fl    MPV 11.2 6.0 - 12.0 fL    Platelets 195 140 - 450 10*3/mm3    Neutrophil % 84.7 (H) 42.7 - 76.0 %    Lymphocyte % 8.5 (L) 19.6 - 45.3 %    Monocyte % 5.4 5.0 - 12.0 %    Eosinophil % 0.7 0.3 - 6.2 %    Basophil % 0.3 0.0 - 1.5 %    Immature Grans % 0.4 0.0 - 0.5 %    Neutrophils, Absolute 13.29 (H) 1.70 - 7.00 10*3/mm3    Lymphocytes, Absolute 1.33 0.70 - 3.10 10*3/mm3    Monocytes, Absolute 0.84 0.10 - 0.90 10*3/mm3    Eosinophils, Absolute 0.11 0.00 - 0.40 10*3/mm3    Basophils, Absolute 0.05 0.00 - 0.20 10*3/mm3    Immature Grans, Absolute 0.06 (H) 0.00 - 0.05 10*3/mm3    nRBC 0.0 0.0 - 0.2 /100 WBC   Urinalysis With Microscopic If Indicated (No Culture) - Urine, Clean Catch    Collection Time: 06/15/20  7:48 PM   Result Value  "Ref Range    Color, UA Orange (A) Yellow, Straw    Appearance, UA Cloudy (A) Clear    pH, UA <=5.0 5.0 - 8.0    Specific Gravity, UA 1.021 1.001 - 1.030    Glucose, UA Negative Negative    Ketones, UA 40 mg/dL (2+) (A) Negative    Bilirubin, UA Small (1+) (A) Negative    Blood, UA Moderate (2+) (A) Negative    Protein,  mg/dL (2+) (A) Negative    Leuk Esterase, UA Moderate (2+) (A) Negative    Nitrite, UA Positive (A) Negative    Urobilinogen, UA 1.0 E.U./dL 0.2 - 1.0 E.U./dL   Urinalysis, Microscopic Only - Urine, Clean Catch    Collection Time: 06/15/20  7:48 PM   Result Value Ref Range    RBC, UA 3-6 (A) None Seen, 0-2 /HPF    WBC, UA Too Numerous to Count (A) None Seen, 0-2 /HPF    Bacteria, UA 4+ (A) None Seen, Trace /HPF    Squamous Epithelial Cells, UA 0-2 None Seen, 0-2 /HPF    Hyaline Casts, UA 7-12 0 - 6 /LPF    Methodology Manual Light Microscopy    POCT pregnancy, urine    Collection Time: 06/15/20  7:55 PM   Result Value Ref Range    HCG, Urine, QL Negative Negative    Lot Number [id number]     Internal Positive Control Positive     Internal Negative Control Negative      Note: In addition to lab results from this visit, the labs listed above may include labs taken at another facility or during a different encounter within the last 24 hours. Please correlate lab times with ED admission and discharge times for further clarification of the services performed during this visit.    CT Abdomen Pelvis With Contrast   Final Result   There are bilateral nonobstructing kidney stones. No evidence of ureteral stone or hydronephrosis.               Signer Name: Anup Hutchins MD    Signed: 6/15/2020 9:08 PM    Workstation Name: [id number]     [facility]        Vitals:    06/15/20 1518 06/15/20 1523   BP:  118/71   Pulse:  80   Resp:  16   Temp: 97.8 °F (36.6 °C) 98.6 °F (37 °C)   TempSrc: Temporal    SpO2:  98%   Weight: 99.8 kg (220 lb) 90.7 kg (200 lb)   Height: 160 cm (63\") 160 cm " "(63\")     Medications   sodium chloride 0.9 % flush 10 mL (has no administration in time range)   cefTRIAXone (ROCEPHIN) 2 g/50 mL 0.9% NS (MBP) (has no administration in time range)   HYDROmorphone (DILAUDID) injection 0.5 mg (has no administration in time range)   phenazopyridine (PYRIDIUM) tablet 200 mg (has no administration in time range)   sodium chloride 0.9 % bolus 1,000 mL (1,000 mL Intravenous New Bag 6/15/20 1906)   ondansetron (ZOFRAN) injection 4 mg (4 mg Intravenous Given 6/15/20 1907)   HYDROmorphone (DILAUDID) injection 0.5 mg (0.5 mg Intravenous Given 6/15/20 1908)   ketorolac (TORADOL) injection 15 mg (15 mg Intravenous Given 6/15/20 1909)   sodium chloride 0.9 % bolus 1,000 mL (1,000 mL Intravenous New Bag 6/15/20 1934)   iopamidol (ISOVUE-300) 61 % injection 100 mL (95 mL Intravenous Given 6/15/20 2056)     ECG/EMG Results (last 24 hours)     ** No results found for the last 24 hours. **        No orders to display         COVID-19 RISK SCREEN     1. Has the patient had close contact without PPE with a lab confirmed COVID-19 (+) person or a person under investigation (PUI) for COVID-19 infection?  -- No     2. Has the patient had respiratory symptoms, worsened/new cough and/or SOA, unexplained fever, or sudden loss of smell and/or taste in the past 7 days? --  No    3. Does the patient have baseline higher exposure risk such as working in healthcare field, currently residing in healthcare facility, or ongoing hemodialysis?  --  No         DISCHARGE    Patient discharged in stable condition.    Reviewed implications of results, diagnosis, meds, responsibility to follow up, warning signs and symptoms of possible worsening, potential complications and reasons to return to ER.    Patient/Family voiced understanding of above instructions.    Discussed plan for discharge, as there is no emergent indication for admission.  Pt/family is agreeable and understands need for follow up and possible repeat " testing.  Pt/family is aware that discharge does not mean that nothing is wrong but that it indicates no emergency is currently present that requires admission and they must continue care with follow-up as given below or with a physician of their choice.     FOLLOW-UP  Paolo Bhandari MD  [address]  [place] [facility] [address] [phone]    Schedule an appointment as soon as possible for a visit       [facility] Emergency Department  [address] [place] [address] [phone]    If symptoms worsen         Medication List      New Prescriptions    ciprofloxacin 500 MG tablet  Commonly known as:  CIPRO  Take 1 tablet by mouth 2 (Two) Times a Day for 10 days.     HYDROcodone-acetaminophen 7.5-325 MG per tablet  Commonly known as:  NORCO  Take 1 tablet by mouth Every 6 (Six) Hours As Needed for Moderate Pain    for up to 15 doses.     ondansetron ODT 4 MG disintegrating tablet  Commonly known as:  ZOFRAN-ODT  Place 1 tablet on the tongue Every 6 (Six) Hours As Needed for Nausea or   Vomiting for up to 15 doses.     phenazopyridine 200 MG tablet  Commonly known as:  PYRIDIUM  Take 1 tablet by mouth 3 (Three) Times a Day As Needed for Bladder Spasms   for up to 2 days.                                            MDM    Final diagnoses:   Pyelonephritis            Tashia Burleson PA  06/15/20 3151

## 2020-06-17 LAB — BACTERIA SPEC AEROBE CULT: ABNORMAL

## 2020-11-14 ENCOUNTER — APPOINTMENT (OUTPATIENT)
Dept: CT IMAGING | Facility: HOSPITAL | Age: 35
End: 2020-11-14

## 2020-11-14 ENCOUNTER — HOSPITAL ENCOUNTER (EMERGENCY)
Facility: HOSPITAL | Age: 35
Discharge: HOME | End: 2020-11-14
Attending: EMERGENCY MEDICINE | Admitting: EMERGENCY MEDICINE

## 2020-11-14 VITALS
OXYGEN SATURATION: 96 % | HEART RATE: 82 BPM | SYSTOLIC BLOOD PRESSURE: 93 MMHG | WEIGHT: 220 LBS | RESPIRATION RATE: 20 BRPM | HEIGHT: 63 IN | BODY MASS INDEX: 38.98 KG/M2 | DIASTOLIC BLOOD PRESSURE: 54 MMHG | TEMPERATURE: 97.5 F

## 2020-11-14 DIAGNOSIS — N23 RENAL COLIC ON RIGHT SIDE: ICD-10-CM

## 2020-11-14 DIAGNOSIS — N10 ACUTE PYELONEPHRITIS: Primary | ICD-10-CM

## 2020-11-14 LAB
ALBUMIN SERPL-MCNC: 4.4 G/DL (ref 3.5–5.2)
ALBUMIN/GLOB SERPL: 1.7 G/DL
ALP SERPL-CCNC: 86 U/L (ref 39–117)
ALT SERPL W P-5'-P-CCNC: 22 U/L (ref 1–33)
ANION GAP SERPL CALCULATED.3IONS-SCNC: 12 MMOL/L (ref 5–15)
AST SERPL-CCNC: 17 U/L (ref 1–32)
BACTERIA UR QL AUTO: ABNORMAL /HPF
BASOPHILS # BLD AUTO: 0.05 10*3/MM3 (ref 0–0.2)
BASOPHILS NFR BLD AUTO: 0.3 % (ref 0–1.5)
BILIRUB SERPL-MCNC: 0.3 MG/DL (ref 0–1.2)
BILIRUB UR QL STRIP: ABNORMAL
BUN SERPL-MCNC: 18 MG/DL (ref 6–20)
BUN/CREAT SERPL: 22.5 (ref 7–25)
CALCIUM SPEC-SCNC: 9.1 MG/DL (ref 8.6–10.5)
CHLORIDE SERPL-SCNC: 106 MMOL/L (ref 98–107)
CLARITY UR: ABNORMAL
CO2 SERPL-SCNC: 19 MMOL/L (ref 22–29)
COLOR UR: ABNORMAL
CREAT SERPL-MCNC: 0.8 MG/DL (ref 0.57–1)
D-LACTATE SERPL-SCNC: 1.4 MMOL/L (ref 0.5–2)
DEPRECATED RDW RBC AUTO: 43.3 FL (ref 37–54)
EOSINOPHIL # BLD AUTO: 0.02 10*3/MM3 (ref 0–0.4)
EOSINOPHIL NFR BLD AUTO: 0.1 % (ref 0.3–6.2)
ERYTHROCYTE [DISTWIDTH] IN BLOOD BY AUTOMATED COUNT: 12.6 % (ref 12.3–15.4)
GFR SERPL CREATININE-BSD FRML MDRD: 82 ML/MIN/1.73
GLOBULIN UR ELPH-MCNC: 2.6 GM/DL
GLUCOSE SERPL-MCNC: 126 MG/DL (ref 65–99)
GLUCOSE UR STRIP-MCNC: NEGATIVE MG/DL
HCT VFR BLD AUTO: 41.1 % (ref 34–46.6)
HGB BLD-MCNC: 13.6 G/DL (ref 12–15.9)
HGB UR QL STRIP.AUTO: ABNORMAL
HOLD SPECIMEN: NORMAL
HOLD SPECIMEN: NORMAL
HYALINE CASTS UR QL AUTO: ABNORMAL /LPF
IMM GRANULOCYTES # BLD AUTO: 0.06 10*3/MM3 (ref 0–0.05)
IMM GRANULOCYTES NFR BLD AUTO: 0.3 % (ref 0–0.5)
KETONES UR QL STRIP: NEGATIVE
LEUKOCYTE ESTERASE UR QL STRIP.AUTO: ABNORMAL
LIPASE SERPL-CCNC: 19 U/L (ref 13–60)
LYMPHOCYTES # BLD AUTO: 0.93 10*3/MM3 (ref 0.7–3.1)
LYMPHOCYTES NFR BLD AUTO: 4.9 % (ref 19.6–45.3)
MCH RBC QN AUTO: 30.7 PG (ref 26.6–33)
MCHC RBC AUTO-ENTMCNC: 33.1 G/DL (ref 31.5–35.7)
MCV RBC AUTO: 92.8 FL (ref 79–97)
MONOCYTES # BLD AUTO: 0.36 10*3/MM3 (ref 0.1–0.9)
MONOCYTES NFR BLD AUTO: 1.9 % (ref 5–12)
MUCOUS THREADS URNS QL MICRO: ABNORMAL /HPF
NEUTROPHILS NFR BLD AUTO: 17.65 10*3/MM3 (ref 1.7–7)
NEUTROPHILS NFR BLD AUTO: 92.5 % (ref 42.7–76)
NITRITE UR QL STRIP: POSITIVE
NRBC BLD AUTO-RTO: 0 /100 WBC (ref 0–0.2)
PH UR STRIP.AUTO: <=5 [PH] (ref 5–8)
PLATELET # BLD AUTO: 224 10*3/MM3 (ref 140–450)
PMV BLD AUTO: 10.9 FL (ref 6–12)
POTASSIUM SERPL-SCNC: 3.9 MMOL/L (ref 3.5–5.2)
PROT SERPL-MCNC: 7 G/DL (ref 6–8.5)
PROT UR QL STRIP: ABNORMAL
RBC # BLD AUTO: 4.43 10*6/MM3 (ref 3.77–5.28)
RBC # UR: ABNORMAL /HPF
REF LAB TEST METHOD: ABNORMAL
SODIUM SERPL-SCNC: 137 MMOL/L (ref 136–145)
SP GR UR STRIP: 1.02 (ref 1–1.03)
SQUAMOUS #/AREA URNS HPF: ABNORMAL /HPF
UROBILINOGEN UR QL STRIP: ABNORMAL
WBC # BLD AUTO: 19.07 10*3/MM3 (ref 3.4–10.8)
WBC UR QL AUTO: ABNORMAL /HPF
WHOLE BLOOD HOLD SPECIMEN: NORMAL
WHOLE BLOOD HOLD SPECIMEN: NORMAL

## 2020-11-14 PROCEDURE — 25010000002 ONDANSETRON PER 1 MG: Performed by: EMERGENCY MEDICINE

## 2020-11-14 PROCEDURE — 81001 URINALYSIS AUTO W/SCOPE: CPT

## 2020-11-14 PROCEDURE — 83690 ASSAY OF LIPASE: CPT

## 2020-11-14 PROCEDURE — 74176 CT ABD & PELVIS W/O CONTRAST: CPT

## 2020-11-14 PROCEDURE — 83605 ASSAY OF LACTIC ACID: CPT

## 2020-11-14 PROCEDURE — 25010000002 CEFTRIAXONE PER 250 MG: Performed by: EMERGENCY MEDICINE

## 2020-11-14 PROCEDURE — 99284 EMERGENCY DEPT VISIT MOD MDM: CPT

## 2020-11-14 PROCEDURE — 96376 TX/PRO/DX INJ SAME DRUG ADON: CPT

## 2020-11-14 PROCEDURE — 96375 TX/PRO/DX INJ NEW DRUG ADDON: CPT

## 2020-11-14 PROCEDURE — 87186 SC STD MICRODIL/AGAR DIL: CPT | Performed by: EMERGENCY MEDICINE

## 2020-11-14 PROCEDURE — 25010000002 KETOROLAC TROMETHAMINE PER 15 MG: Performed by: EMERGENCY MEDICINE

## 2020-11-14 PROCEDURE — 85025 COMPLETE CBC W/AUTO DIFF WBC: CPT

## 2020-11-14 PROCEDURE — 80053 COMPREHEN METABOLIC PANEL: CPT

## 2020-11-14 PROCEDURE — 96365 THER/PROPH/DIAG IV INF INIT: CPT

## 2020-11-14 PROCEDURE — 87088 URINE BACTERIA CULTURE: CPT | Performed by: EMERGENCY MEDICINE

## 2020-11-14 PROCEDURE — 87086 URINE CULTURE/COLONY COUNT: CPT | Performed by: EMERGENCY MEDICINE

## 2020-11-14 RX ORDER — PHENAZOPYRIDINE HYDROCHLORIDE 200 MG/1
200 TABLET, FILM COATED ORAL 3 TIMES DAILY PRN
Qty: 9 TABLET | Refills: 0 | Status: SHIPPED | OUTPATIENT
Start: 2020-11-14

## 2020-11-14 RX ORDER — ACETAMINOPHEN 500 MG
1000 TABLET ORAL ONCE
Status: COMPLETED | OUTPATIENT
Start: 2020-11-14 | End: 2020-11-14

## 2020-11-14 RX ORDER — SODIUM CHLORIDE 9 MG/ML
10 INJECTION INTRAVENOUS AS NEEDED
Status: DISCONTINUED | OUTPATIENT
Start: 2020-11-14 | End: 2020-11-14 | Stop reason: HOSPADM

## 2020-11-14 RX ORDER — KETOROLAC TROMETHAMINE 30 MG/ML
10 INJECTION, SOLUTION INTRAMUSCULAR; INTRAVENOUS ONCE
Status: COMPLETED | OUTPATIENT
Start: 2020-11-14 | End: 2020-11-14

## 2020-11-14 RX ORDER — KETOROLAC TROMETHAMINE 30 MG/ML
15 INJECTION, SOLUTION INTRAMUSCULAR; INTRAVENOUS ONCE
Status: COMPLETED | OUTPATIENT
Start: 2020-11-14 | End: 2020-11-14

## 2020-11-14 RX ORDER — CEFDINIR 300 MG/1
300 CAPSULE ORAL 2 TIMES DAILY
Qty: 20 CAPSULE | Refills: 0 | Status: SHIPPED | OUTPATIENT
Start: 2020-11-14

## 2020-11-14 RX ORDER — ONDANSETRON 2 MG/ML
4 INJECTION INTRAMUSCULAR; INTRAVENOUS ONCE
Status: COMPLETED | OUTPATIENT
Start: 2020-11-14 | End: 2020-11-14

## 2020-11-14 RX ORDER — KETOROLAC TROMETHAMINE 10 MG/1
10 TABLET, FILM COATED ORAL EVERY 6 HOURS PRN
Qty: 20 TABLET | Refills: 0 | Status: SHIPPED | OUTPATIENT
Start: 2020-11-14

## 2020-11-14 RX ORDER — PHENAZOPYRIDINE HYDROCHLORIDE 100 MG/1
200 TABLET, FILM COATED ORAL ONCE
Status: COMPLETED | OUTPATIENT
Start: 2020-11-14 | End: 2020-11-14

## 2020-11-14 RX ORDER — ONDANSETRON 8 MG/1
8 TABLET, ORALLY DISINTEGRATING ORAL EVERY 8 HOURS PRN
Qty: 15 TABLET | Refills: 0 | Status: SHIPPED | OUTPATIENT
Start: 2020-11-14

## 2020-11-14 RX ADMIN — CEFTRIAXONE SODIUM 1 G: 1 INJECTION, POWDER, FOR SOLUTION INTRAMUSCULAR; INTRAVENOUS at 17:33

## 2020-11-14 RX ADMIN — KETOROLAC TROMETHAMINE 15 MG: 30 INJECTION, SOLUTION INTRAMUSCULAR; INTRAVENOUS at 16:11

## 2020-11-14 RX ADMIN — LIDOCAINE HYDROCHLORIDE 150 MG: 10 INJECTION, SOLUTION EPIDURAL; INFILTRATION; INTRACAUDAL; PERINEURAL at 16:26

## 2020-11-14 RX ADMIN — KETOROLAC TROMETHAMINE 10 MG: 30 INJECTION, SOLUTION INTRAMUSCULAR; INTRAVENOUS at 18:57

## 2020-11-14 RX ADMIN — ONDANSETRON 4 MG: 2 INJECTION INTRAMUSCULAR; INTRAVENOUS at 18:56

## 2020-11-14 RX ADMIN — ACETAMINOPHEN 1000 MG: 500 TABLET, FILM COATED ORAL at 16:11

## 2020-11-14 RX ADMIN — PHENAZOPYRIDINE HYDROCHLORIDE 200 MG: 100 TABLET ORAL at 18:58

## 2020-11-14 RX ADMIN — SODIUM CHLORIDE, POTASSIUM CHLORIDE, SODIUM LACTATE AND CALCIUM CHLORIDE 1000 ML: 600; 310; 30; 20 INJECTION, SOLUTION INTRAVENOUS at 16:12

## 2020-11-14 NOTE — ED PROVIDER NOTES
Subjective   Marta Amaya is a 35 y.o. female who presents to the ED with c/o flank pain. The patient reports she had sudden onset of right flank pain wrapping to her right hip this morning. She describes the sharp and aching pain as 10/10 in severity and it has been constant since onset. The patient has been using Ibuprofen and a heating pad for her symptoms but neither of these have been effective. She reports having recurrent urinary tract infections in the past, as well as an episode of kidney stones and pyelonephritis a few years ago. The patient complains of dysuria, abdominal pain, nausea, and vomiting but denies fever, chest pain, and shortness of breath. There are no other acute complaints at this time.      History provided by:  Patient  Flank Pain  Pain location:  R flank  Pain quality: aching and sharp    Pain radiation: right hip.  Pain severity:  Severe  Onset quality:  Sudden  Duration:  8 hours  Timing:  Constant  Progression:  Waxing and waning  Chronicity:  Recurrent  Context: not eating, not sick contacts, not suspicious food intake and not trauma    Relieved by:  Nothing  Worsened by:  Nothing  Ineffective treatments:  Acetaminophen, heat, not moving, movement, palpation and lying down  Associated symptoms: dysuria, nausea and vomiting    Associated symptoms: no chest pain, no fever and no shortness of breath    Risk factors: no alcohol abuse, not elderly, not obese and not pregnant        Review of Systems   Constitutional: Negative for fever.   Respiratory: Negative for shortness of breath.    Cardiovascular: Negative for chest pain.   Gastrointestinal: Positive for abdominal pain, nausea and vomiting.   Genitourinary: Positive for dysuria and flank pain.   Musculoskeletal:        She complains of pain radiating into her right hip.   All other systems reviewed and are negative.      Past Medical History:   Diagnosis Date   • Cellulitis    • Kidney stones    • Obesity    • Skin lesion of  breast    • URI (upper respiratory infection)        No Known Allergies    Past Surgical History:   Procedure Laterality Date   • APPENDECTOMY     • TONSILLECTOMY     • TUBAL ABDOMINAL LIGATION         Family History   Problem Relation Age of Onset   • No Known Problems Mother    • Hypertension Father    • Diabetes Other    • Heart disease Other    • Hypertension Other        Social History     Socioeconomic History   • Marital status:      Spouse name: Not on file   • Number of children: Not on file   • Years of education: Not on file   • Highest education level: Not on file   Occupational History   • Occupation:  facility   Tobacco Use   • Smoking status: Current Every Day Smoker     Packs/day: 0.50     Years: 0.00     Pack years: 0.00     Types: Cigarettes     Last attempt to quit: 2016     Years since quittin.8   • Smokeless tobacco: Never Used   Substance and Sexual Activity   • Alcohol use: No   • Drug use: No   • Sexual activity: Yes     Partners: Male     Birth control/protection: Surgical     Comment:          Objective   Physical Exam  Vitals signs and nursing note reviewed.   Constitutional:       General: She is in acute distress.      Appearance: She is well-developed.      Comments: The patient appears to be in a significant amount of pain, lying on her right side in her ED bed and she is having a difficult time lying still secondary to her pain.   HENT:      Head: Normocephalic and atraumatic.   Eyes:      General: No scleral icterus.     Conjunctiva/sclera: Conjunctivae normal.   Neck:      Musculoskeletal: Normal range of motion and neck supple.   Cardiovascular:      Rate and Rhythm: Normal rate and regular rhythm.      Heart sounds: Normal heart sounds. No murmur.      Comments: Strong radial pulses.  Pulmonary:      Effort: Pulmonary effort is normal. No respiratory distress.      Breath sounds: Normal breath sounds.   Abdominal:      Palpations: Abdomen is soft.       Satisfactory Tenderness: There is abdominal tenderness in the suprapubic area. There is right CVA tenderness.      Comments: Right sided CVA tenderness to palpation.  Mild suprapubic tenderness to palpation.   Musculoskeletal: Normal range of motion.   Skin:     General: Skin is warm and dry.   Neurological:      Mental Status: She is alert and oriented to person, place, and time.   Psychiatric:         Behavior: Behavior normal.         Procedures         ED Course  ED Course as of Nov 14 2024   Sat Nov 14, 2020   1602 WBC(!): 19.07 [RS]   1702 Leukocytes, UA(!): Large (3+) [RS]   1702 Nitrite, UA(!): Positive [RS]   1702 WBC, UA(!): Too Numerous to Count [RS]   1702 Bacteria, UA(!): 4+ [RS]   1702 Patient's findings consistent with acute pyelonephritis on the right with possible passage of a kidney stone there is no obstructing uropathy at this time.  There are stones within the kidneys themselves.  Patient's presentation, labs, and imaging confirmed the diagnosis of pyelonephritis and antibiotics have been ordered with culture obtained.    [RS]   1851 Patient reports that she is currently not feeling much better.  I offered her admission at this time and she states she would prefer not to be admitted.  Thus, we will give her some more medicines and observe for determination of final disposition plan.    [RS]   1924 Patient reports improvement in her symptoms and states she feels like she can be discharged home.  We did stress the potential worsening or need for admission/further intervention.  She voiced understanding and agrees to return immediately for any concerns or worsening. I had a discussion with the patient/family regarding diagnosis, diagnostic results, treatment plan, and medications.  The patient/family indicated understanding of these instructions.  I spent adequate time at the bedside proceeding discharge necessary to personally discuss the aftercare instructions, giving patient education, providing explanations  of the results of our evaluations/findings, and my decision making to assure that the patient/family understand the plan of care.  Time was allotted to answer questions at that time and throughout the ED course.  Emphasis was placed on timely follow-up after discharge.  I also discussed the potential for the development of an acute emergent condition requiring further evaluation, admission, or even surgical intervention. I discussed that we found nothing during the visit today indicating the need for further workup, admission, or the presence of an unstable medical condition.  I encouraged the patient to return to the emergency department immediately for ANY concerns, worsening, new complaints, or if symptoms persist and unable to seek follow-up in a timely fashion.  The patient/family expressed understanding and agreement with this plan.     [RS]      ED Course User Index  [RS] Marquez Benitez MD     Recent Results (from the past 24 hour(s))   Comprehensive Metabolic Panel    Collection Time: 11/14/20  3:41 PM    Specimen: Blood   Result Value Ref Range    Glucose 126 (H) 65 - 99 mg/dL    BUN 18 6 - 20 mg/dL    Creatinine 0.80 0.57 - 1.00 mg/dL    Sodium 137 136 - 145 mmol/L    Potassium 3.9 3.5 - 5.2 mmol/L    Chloride 106 98 - 107 mmol/L    CO2 19.0 (L) 22.0 - 29.0 mmol/L    Calcium 9.1 8.6 - 10.5 mg/dL    Total Protein 7.0 6.0 - 8.5 g/dL    Albumin 4.40 3.50 - 5.20 g/dL    ALT (SGPT) 22 1 - 33 U/L    AST (SGOT) 17 1 - 32 U/L    Alkaline Phosphatase 86 39 - 117 U/L    Total Bilirubin 0.3 0.0 - 1.2 mg/dL    eGFR Non African Amer 82 >60 mL/min/1.73    Globulin 2.6 gm/dL    A/G Ratio 1.7 g/dL    BUN/Creatinine Ratio 22.5 7.0 - 25.0    Anion Gap 12.0 5.0 - 15.0 mmol/L   Lipase    Collection Time: 11/14/20  3:41 PM    Specimen: Blood   Result Value Ref Range    Lipase 19 13 - 60 U/L   Lactic Acid, Plasma    Collection Time: 11/14/20  3:41 PM    Specimen: Blood   Result Value Ref Range    Lactate 1.4 0.5 - 2.0  mmol/L   Light Blue Top    Collection Time: 11/14/20  3:41 PM   Result Value Ref Range    Extra Tube hold for add-on    Green Top (Gel)    Collection Time: 11/14/20  3:41 PM   Result Value Ref Range    Extra Tube Hold for add-ons.    Lavender Top    Collection Time: 11/14/20  3:41 PM   Result Value Ref Range    Extra Tube hold for add-on    Gold Top - SST    Collection Time: 11/14/20  3:41 PM   Result Value Ref Range    Extra Tube Hold for add-ons.    CBC Auto Differential    Collection Time: 11/14/20  3:41 PM    Specimen: Blood   Result Value Ref Range    WBC 19.07 (H) 3.40 - 10.80 10*3/mm3    RBC 4.43 3.77 - 5.28 10*6/mm3    Hemoglobin 13.6 12.0 - 15.9 g/dL    Hematocrit 41.1 34.0 - 46.6 %    MCV 92.8 79.0 - 97.0 fL    MCH 30.7 26.6 - 33.0 pg    MCHC 33.1 31.5 - 35.7 g/dL    RDW 12.6 12.3 - 15.4 %    RDW-SD 43.3 37.0 - 54.0 fl    MPV 10.9 6.0 - 12.0 fL    Platelets 224 140 - 450 10*3/mm3    Neutrophil % 92.5 (H) 42.7 - 76.0 %    Lymphocyte % 4.9 (L) 19.6 - 45.3 %    Monocyte % 1.9 (L) 5.0 - 12.0 %    Eosinophil % 0.1 (L) 0.3 - 6.2 %    Basophil % 0.3 0.0 - 1.5 %    Immature Grans % 0.3 0.0 - 0.5 %    Neutrophils, Absolute 17.65 (H) 1.70 - 7.00 10*3/mm3    Lymphocytes, Absolute 0.93 0.70 - 3.10 10*3/mm3    Monocytes, Absolute 0.36 0.10 - 0.90 10*3/mm3    Eosinophils, Absolute 0.02 0.00 - 0.40 10*3/mm3    Basophils, Absolute 0.05 0.00 - 0.20 10*3/mm3    Immature Grans, Absolute 0.06 (H) 0.00 - 0.05 10*3/mm3    nRBC 0.0 0.0 - 0.2 /100 WBC   Urinalysis With Microscopic If Indicated (No Culture) - Urine, Clean Catch    Collection Time: 11/14/20  4:38 PM    Specimen: Urine, Clean Catch   Result Value Ref Range    Color, UA Orange (A) Yellow, Straw    Appearance, UA Cloudy (A) Clear    pH, UA <=5.0 5.0 - 8.0    Specific Gravity, UA 1.020 1.001 - 1.030    Glucose, UA Negative Negative    Ketones, UA Negative Negative    Bilirubin, UA Small (1+) (A) Negative    Blood, UA Large (3+) (A) Negative    Protein,  mg/dL  (2+) (A) Negative    Leuk Esterase, UA Large (3+) (A) Negative    Nitrite, UA Positive (A) Negative    Urobilinogen, UA 1.0 E.U./dL 0.2 - 1.0 E.U./dL   Urinalysis, Microscopic Only - Urine, Clean Catch    Collection Time: 11/14/20  4:38 PM    Specimen: Urine, Clean Catch   Result Value Ref Range    RBC, UA 31-50 (A) None Seen, 0-2 /HPF    WBC, UA Too Numerous to Count (A) None Seen, 0-2 /HPF    Bacteria, UA 4+ (A) None Seen, Trace /HPF    Squamous Epithelial Cells, UA 7-12 (A) None Seen, 0-2 /HPF    Hyaline Casts, UA 0-6 0 - 6 /LPF    Mucus, UA Small/1+ (A) None Seen, Trace /HPF    Methodology Manual Light Microscopy      Note: In addition to lab results from this visit, the labs listed above may include labs taken at another facility or during a different encounter within the last 24 hours. Please correlate lab times with ED admission and discharge times for further clarification of the services performed during this visit.    CT Abdomen Pelvis Without Contrast   Final Result   Bilateral nephrolithiasis with moderate right hydronephrosis   and right hydroureter extending throughout the length of the right   ureter without obstructing calculus to suggest obstructing uropathy on   the current exam may represent recently passed stone given periureteral   stranding and dilatation. No bladder calculi evident or inflammatory   findings of the bladder wall.       DICTATED:   11/14/2020   EDITED/ls :   11/14/2020        This report was finalized on 11/14/2020 6:51 PM by Dr. Madi Borja.            Vitals:    11/14/20 1830 11/14/20 1834 11/14/20 1920 11/14/20 1930   BP: 111/60 110/60 103/60 93/54   BP Location:  Left arm     Patient Position:  Lying     Pulse:  82     Resp:  20     Temp:       TempSrc:       SpO2: 100% 96%     Weight:       Height:         Medications   Sodium Chloride (PF) 0.9 % 10 mL (has no administration in time range)   lactated ringers bolus 1,000 mL (0 mL Intravenous Stopped 11/14/20 1835)    ketorolac (TORADOL) injection 15 mg (15 mg Intravenous Given 11/14/20 1611)   acetaminophen (TYLENOL) tablet 1,000 mg (1,000 mg Oral Given 11/14/20 1611)   lidocaine (XYLOCAINE) 1 % 150 mg in sodium chloride 0.9 % 250 mL IVPB (150 mg Intravenous Given 11/14/20 1626)   cefTRIAXone (ROCEPHIN) 1 g/100 mL 0.9% NS (MBP) (0 g Intravenous Stopped 11/14/20 1835)   ondansetron (ZOFRAN) injection 4 mg (4 mg Intravenous Given 11/14/20 1856)   ketorolac (TORADOL) injection 10 mg (10 mg Intravenous Given 11/14/20 1857)   phenazopyridine (PYRIDIUM) tablet 200 mg (200 mg Oral Given 11/14/20 1858)     ECG/EMG Results (last 24 hours)     ** No results found for the last 24 hours. **        No orders to display                                                MDM  Number of Diagnoses or Management Options  Acute pyelonephritis:   Renal colic on right side:      Amount and/or Complexity of Data Reviewed  Clinical lab tests: reviewed  Tests in the radiology section of CPT®: reviewed  Independent visualization of images, tracings, or specimens: yes        Final diagnoses:   Acute pyelonephritis   Renal colic on right side       Documentation assistance provided by rodger Arthur.  Information recorded by the rodger was done at my direction and has been verified and validated by me.     Rey Arthur  11/14/20 1630       Marquez Benitez MD  11/14/20 2024

## 2020-11-16 LAB — BACTERIA SPEC AEROBE CULT: ABNORMAL

## 2024-05-10 ENCOUNTER — READMISSION MANAGEMENT (OUTPATIENT)
Dept: CALL CENTER | Facility: HOSPITAL | Age: 39
End: 2024-05-10
Payer: COMMERCIAL

## 2024-05-13 ENCOUNTER — TRANSITIONAL CARE MANAGEMENT TELEPHONE ENCOUNTER (OUTPATIENT)
Dept: CALL CENTER | Facility: HOSPITAL | Age: 39
End: 2024-05-13
Payer: COMMERCIAL

## 2024-05-13 NOTE — OUTREACH NOTE
Call Center TCM Note      Flowsheet Row Responses   [facility] facility patient discharged from? Non-  [[facility]]   Does the patient have one of the following disease processes/diagnoses(primary or secondary)? Other   TCM attempt successful? No   Unsuccessful attempts Attempt 2            Mary Ovalle LPN    5/13/2024, 15:57 EDT

## 2024-05-13 NOTE — OUTREACH NOTE
Call Center TCM Note      Flowsheet Row Responses   [facility] patient discharged from? Non-BH   Does the patient have one of the following disease processes/diagnoses(primary or secondary)? Other   TCM attempt successful? No   Unsuccessful attempts Attempt 1            Mary Ovalle LPN    5/13/2024, 11:08 EDT

## 2024-05-14 ENCOUNTER — TRANSITIONAL CARE MANAGEMENT TELEPHONE ENCOUNTER (OUTPATIENT)
Dept: CALL CENTER | Facility: HOSPITAL | Age: 39
End: 2024-05-14
Payer: COMMERCIAL

## 2024-05-14 NOTE — OUTREACH NOTE
Call Center TCM Note      Flowsheet Row Responses   [facility] patient discharged from? Non-BH   Does the patient have one of the following disease processes/diagnoses(primary or secondary)? Other   TCM attempt successful? No   Unsuccessful attempts Attempt 3            Kennedi Corcoran RN    5/14/2024, 15:39 EDT

## 2024-05-15 ENCOUNTER — READMISSION MANAGEMENT (OUTPATIENT)
Dept: CALL CENTER | Facility: HOSPITAL | Age: 39
End: 2024-05-15
Payer: COMMERCIAL

## 2024-05-15 NOTE — OUTREACH NOTE
Prep Survey      Flowsheet Row Responses   [facility] facility patient discharged from? Non-BH   Is LACE score < 7 ? Non-BH Discharge   Eligibility [facility]   Date of Admission 05/12/24   Date of Discharge 05/15/24   Discharge Disposition Home or Self Care   Discharge diagnosis Postoperative pain/Fever   Does the patient have one of the following disease processes/diagnoses(primary or secondary)? Other   Does the patient have Home health ordered? No   Prep survey completed? Yes            SABINE BORRERO - Registered Nurse

## 2024-05-16 ENCOUNTER — TRANSITIONAL CARE MANAGEMENT TELEPHONE ENCOUNTER (OUTPATIENT)
Dept: CALL CENTER | Facility: HOSPITAL | Age: 39
End: 2024-05-16
Payer: COMMERCIAL

## 2024-05-16 NOTE — OUTREACH NOTE
Call Center TCM Note      Flowsheet Row Responses   [facility] patient discharged from? Non-   Does the patient have one of the following disease processes/diagnoses(primary or secondary)? Other   TCM attempt successful? Yes   Call start time 0818   Call end time 0820   Discharge diagnosis Postoperative pain/Fever   Meds reviewed with patient/caregiver? Yes   Is the patient taking all medications as directed (includes completed medication regime)? Yes   Does the patient have an appointment with their PCP within 7-14 days of discharge? No   Nursing Interventions Patient declined scheduling/rescheduling appointment at this time, Patient desires to follow up with specialty only   Has home health visited the patient within 72 hours of discharge? N/A   Psychosocial issues? No   Did the patient receive a copy of their discharge instructions? Yes   What is the patient's perception of their health status since discharge? Improving   Is the patient/caregiver able to teach back signs and symptoms related to disease process for when to call PCP? Yes   Is the patient/caregiver able to teach back signs and symptoms related to disease process for when to call 911? Yes   Is the patient/caregiver able to teach back the hierarchy of who to call/visit for symptoms/problems? PCP, Specialist, Home health nurse, Urgent Care, ED, 911 Yes   TCM call completed? Yes   Call end time 0820   Would this patient benefit from a Referral to Amb Social Work? No   Is the patient interested in additional calls from an ambulatory ? No            Mary Ovalle LPN    5/16/2024, 08:23 EDT

## 2024-11-16 ENCOUNTER — HOSPITAL ENCOUNTER (INPATIENT)
Facility: HOSPITAL | Age: 39
LOS: 2 days | Discharge: HOME | DRG: 660 | End: 2024-11-19
Attending: EMERGENCY MEDICINE | Admitting: INTERNAL MEDICINE
Payer: COMMERCIAL

## 2024-11-16 ENCOUNTER — APPOINTMENT (OUTPATIENT)
Dept: GENERAL RADIOLOGY | Facility: HOSPITAL | Age: 39
DRG: 660 | End: 2024-11-16
Payer: COMMERCIAL

## 2024-11-16 ENCOUNTER — APPOINTMENT (OUTPATIENT)
Facility: HOSPITAL | Age: 39
DRG: 660 | End: 2024-11-16
Payer: COMMERCIAL

## 2024-11-16 DIAGNOSIS — N20.1 LEFT URETERAL STONE: Primary | ICD-10-CM

## 2024-11-16 DIAGNOSIS — N39.0 ACUTE URINARY TRACT INFECTION: ICD-10-CM

## 2024-11-16 PROBLEM — N20.0 RENAL STONE: Status: ACTIVE | Noted: 2024-11-16

## 2024-11-16 LAB
ALBUMIN SERPL-MCNC: 4.1 G/DL (ref 3.5–5.2)
ALBUMIN/GLOB SERPL: 1.4 G/DL
ALP SERPL-CCNC: 94 U/L (ref 39–117)
ALT SERPL W P-5'-P-CCNC: 21 U/L (ref 1–33)
ANION GAP SERPL CALCULATED.3IONS-SCNC: 9.3 MMOL/L (ref 5–15)
AST SERPL-CCNC: 23 U/L (ref 1–32)
BACTERIA UR QL AUTO: ABNORMAL /HPF
BASOPHILS # BLD AUTO: 0.02 10*3/MM3 (ref 0–0.2)
BASOPHILS NFR BLD AUTO: 0.2 % (ref 0–1.5)
BILIRUB SERPL-MCNC: 0.4 MG/DL (ref 0–1.2)
BILIRUB UR QL STRIP: NEGATIVE
BUN SERPL-MCNC: 11 MG/DL (ref 6–20)
BUN/CREAT SERPL: 15.9 (ref 7–25)
CALCIUM SPEC-SCNC: 9 MG/DL (ref 8.6–10.5)
CHLORIDE SERPL-SCNC: 104 MMOL/L (ref 98–107)
CLARITY UR: ABNORMAL
CO2 SERPL-SCNC: 24.7 MMOL/L (ref 22–29)
COLOR UR: ABNORMAL
CREAT SERPL-MCNC: 0.69 MG/DL (ref 0.57–1)
D-LACTATE SERPL-SCNC: 1.1 MMOL/L (ref 0.5–2)
DEPRECATED RDW RBC AUTO: 42.5 FL (ref 37–54)
EGFRCR SERPLBLD CKD-EPI 2021: 114.1 ML/MIN/1.73
EOSINOPHIL # BLD AUTO: 0.16 10*3/MM3 (ref 0–0.4)
EOSINOPHIL NFR BLD AUTO: 1.5 % (ref 0.3–6.2)
ERYTHROCYTE [DISTWIDTH] IN BLOOD BY AUTOMATED COUNT: 12.9 % (ref 12.3–15.4)
GLOBULIN UR ELPH-MCNC: 2.9 GM/DL
GLUCOSE SERPL-MCNC: 98 MG/DL (ref 65–99)
GLUCOSE UR STRIP-MCNC: ABNORMAL MG/DL
HCG INTACT+B SERPL-ACNC: <0.2 MIU/ML
HCT VFR BLD AUTO: 40.5 % (ref 34–46.6)
HGB BLD-MCNC: 13.5 G/DL (ref 12–15.9)
HGB UR QL STRIP.AUTO: ABNORMAL
HOLD SPECIMEN: NORMAL
HYALINE CASTS UR QL AUTO: ABNORMAL /LPF
IMM GRANULOCYTES # BLD AUTO: 0.01 10*3/MM3 (ref 0–0.05)
IMM GRANULOCYTES NFR BLD AUTO: 0.1 % (ref 0–0.5)
KETONES UR QL STRIP: ABNORMAL
LEUKOCYTE ESTERASE UR QL STRIP.AUTO: ABNORMAL
LIPASE SERPL-CCNC: 25 U/L (ref 13–60)
LYMPHOCYTES # BLD AUTO: 1.76 10*3/MM3 (ref 0.7–3.1)
LYMPHOCYTES NFR BLD AUTO: 16 % (ref 19.6–45.3)
MCH RBC QN AUTO: 29.8 PG (ref 26.6–33)
MCHC RBC AUTO-ENTMCNC: 33.3 G/DL (ref 31.5–35.7)
MCV RBC AUTO: 89.4 FL (ref 79–97)
MONOCYTES # BLD AUTO: 0.67 10*3/MM3 (ref 0.1–0.9)
MONOCYTES NFR BLD AUTO: 6.1 % (ref 5–12)
NEUTROPHILS NFR BLD AUTO: 76.1 % (ref 42.7–76)
NEUTROPHILS NFR BLD AUTO: 8.35 10*3/MM3 (ref 1.7–7)
NITRITE UR QL STRIP: POSITIVE
PH UR STRIP.AUTO: 5.5 [PH] (ref 5–8)
PLATELET # BLD AUTO: 243 10*3/MM3 (ref 140–450)
PMV BLD AUTO: 10.4 FL (ref 6–12)
POTASSIUM SERPL-SCNC: 3.8 MMOL/L (ref 3.5–5.2)
PROCALCITONIN SERPL-MCNC: 0.04 NG/ML (ref 0–0.25)
PROT SERPL-MCNC: 7 G/DL (ref 6–8.5)
PROT UR QL STRIP: ABNORMAL
RBC # BLD AUTO: 4.53 10*6/MM3 (ref 3.77–5.28)
RBC # UR STRIP: ABNORMAL /HPF
REF LAB TEST METHOD: ABNORMAL
SODIUM SERPL-SCNC: 138 MMOL/L (ref 136–145)
SP GR UR STRIP: 1.01 (ref 1–1.03)
SQUAMOUS #/AREA URNS HPF: ABNORMAL /HPF
UROBILINOGEN UR QL STRIP: ABNORMAL
WBC # UR STRIP: ABNORMAL /HPF
WBC CLUMPS # UR AUTO: ABNORMAL /HPF
WBC NRBC COR # BLD AUTO: 10.97 10*3/MM3 (ref 3.4–10.8)
WHOLE BLOOD HOLD COAG: NORMAL
WHOLE BLOOD HOLD SPECIMEN: NORMAL

## 2024-11-16 PROCEDURE — 87086 URINE CULTURE/COLONY COUNT: CPT | Performed by: PHYSICIAN ASSISTANT

## 2024-11-16 PROCEDURE — 84702 CHORIONIC GONADOTROPIN TEST: CPT | Performed by: EMERGENCY MEDICINE

## 2024-11-16 PROCEDURE — 84145 PROCALCITONIN (PCT): CPT | Performed by: PHYSICIAN ASSISTANT

## 2024-11-16 PROCEDURE — 36415 COLL VENOUS BLD VENIPUNCTURE: CPT

## 2024-11-16 PROCEDURE — 80053 COMPREHEN METABOLIC PANEL: CPT | Performed by: EMERGENCY MEDICINE

## 2024-11-16 PROCEDURE — 83690 ASSAY OF LIPASE: CPT | Performed by: EMERGENCY MEDICINE

## 2024-11-16 PROCEDURE — 81001 URINALYSIS AUTO W/SCOPE: CPT | Performed by: EMERGENCY MEDICINE

## 2024-11-16 PROCEDURE — 25510000001 IOPAMIDOL 61 % SOLUTION: Performed by: EMERGENCY MEDICINE

## 2024-11-16 PROCEDURE — 99221 1ST HOSP IP/OBS SF/LOW 40: CPT | Performed by: NURSE PRACTITIONER

## 2024-11-16 PROCEDURE — 25010000002 HYDROMORPHONE 1 MG/ML SOLUTION: Performed by: EMERGENCY MEDICINE

## 2024-11-16 PROCEDURE — 71045 X-RAY EXAM CHEST 1 VIEW: CPT

## 2024-11-16 PROCEDURE — G0378 HOSPITAL OBSERVATION PER HR: HCPCS

## 2024-11-16 PROCEDURE — 25810000003 SODIUM CHLORIDE 0.9 % SOLUTION: Performed by: EMERGENCY MEDICINE

## 2024-11-16 PROCEDURE — 85025 COMPLETE CBC W/AUTO DIFF WBC: CPT | Performed by: EMERGENCY MEDICINE

## 2024-11-16 PROCEDURE — 74177 CT ABD & PELVIS W/CONTRAST: CPT

## 2024-11-16 PROCEDURE — 25010000002 KETOROLAC TROMETHAMINE PER 15 MG: Performed by: EMERGENCY MEDICINE

## 2024-11-16 PROCEDURE — 25010000002 CEFTRIAXONE PER 250 MG: Performed by: PHYSICIAN ASSISTANT

## 2024-11-16 PROCEDURE — 87040 BLOOD CULTURE FOR BACTERIA: CPT | Performed by: PHYSICIAN ASSISTANT

## 2024-11-16 PROCEDURE — 83605 ASSAY OF LACTIC ACID: CPT | Performed by: PHYSICIAN ASSISTANT

## 2024-11-16 PROCEDURE — 99285 EMERGENCY DEPT VISIT HI MDM: CPT

## 2024-11-16 RX ORDER — NALOXONE HCL 0.4 MG/ML
0.4 VIAL (ML) INJECTION
Status: DISCONTINUED | OUTPATIENT
Start: 2024-11-16 | End: 2024-11-19

## 2024-11-16 RX ORDER — IBUPROFEN 600 MG/1
600 TABLET, FILM COATED ORAL EVERY 6 HOURS PRN
COMMUNITY

## 2024-11-16 RX ORDER — SODIUM CHLORIDE 0.9 % (FLUSH) 0.9 %
10 SYRINGE (ML) INJECTION AS NEEDED
Status: DISCONTINUED | OUTPATIENT
Start: 2024-11-16 | End: 2024-11-19 | Stop reason: HOSPADM

## 2024-11-16 RX ORDER — ACETAMINOPHEN 325 MG/1
650 TABLET ORAL EVERY 4 HOURS PRN
Status: DISCONTINUED | OUTPATIENT
Start: 2024-11-16 | End: 2024-11-17

## 2024-11-16 RX ORDER — SODIUM CHLORIDE 0.9 % (FLUSH) 0.9 %
10 SYRINGE (ML) INJECTION EVERY 12 HOURS SCHEDULED
Status: DISCONTINUED | OUTPATIENT
Start: 2024-11-16 | End: 2024-11-19 | Stop reason: HOSPADM

## 2024-11-16 RX ORDER — HYDROMORPHONE HYDROCHLORIDE 1 MG/ML
0.5 INJECTION, SOLUTION INTRAMUSCULAR; INTRAVENOUS; SUBCUTANEOUS
Status: DISCONTINUED | OUTPATIENT
Start: 2024-11-16 | End: 2024-11-19

## 2024-11-16 RX ORDER — FAMOTIDINE 20 MG/1
40 TABLET, FILM COATED ORAL DAILY
Status: DISCONTINUED | OUTPATIENT
Start: 2024-11-17 | End: 2024-11-19 | Stop reason: HOSPADM

## 2024-11-16 RX ORDER — SODIUM CHLORIDE 9 MG/ML
10 INJECTION, SOLUTION INTRAMUSCULAR; INTRAVENOUS; SUBCUTANEOUS AS NEEDED
Status: DISCONTINUED | OUTPATIENT
Start: 2024-11-16 | End: 2024-11-19 | Stop reason: HOSPADM

## 2024-11-16 RX ORDER — SODIUM CHLORIDE 9 MG/ML
40 INJECTION, SOLUTION INTRAVENOUS AS NEEDED
Status: DISCONTINUED | OUTPATIENT
Start: 2024-11-16 | End: 2024-11-19 | Stop reason: HOSPADM

## 2024-11-16 RX ORDER — ACETAMINOPHEN 325 MG/1
325-650 TABLET ORAL EVERY 4 HOURS PRN
Status: ON HOLD | COMMUNITY
Start: 2024-10-24 | End: 2024-11-16

## 2024-11-16 RX ORDER — POLYETHYLENE GLYCOL 3350 17 G/17G
17 POWDER, FOR SOLUTION ORAL DAILY PRN
Status: DISCONTINUED | OUTPATIENT
Start: 2024-11-16 | End: 2024-11-19 | Stop reason: HOSPADM

## 2024-11-16 RX ORDER — BISACODYL 10 MG
10 SUPPOSITORY, RECTAL RECTAL DAILY PRN
Status: DISCONTINUED | OUTPATIENT
Start: 2024-11-16 | End: 2024-11-19 | Stop reason: HOSPADM

## 2024-11-16 RX ORDER — HYDROCODONE BITARTRATE AND ACETAMINOPHEN 5; 325 MG/1; MG/1
1 TABLET ORAL EVERY 6 HOURS PRN
Status: DISCONTINUED | OUTPATIENT
Start: 2024-11-16 | End: 2024-11-17

## 2024-11-16 RX ORDER — SODIUM CHLORIDE, SODIUM LACTATE, POTASSIUM CHLORIDE, CALCIUM CHLORIDE 600; 310; 30; 20 MG/100ML; MG/100ML; MG/100ML; MG/100ML
100 INJECTION, SOLUTION INTRAVENOUS CONTINUOUS
Status: ACTIVE | OUTPATIENT
Start: 2024-11-16 | End: 2024-11-17

## 2024-11-16 RX ORDER — ONDANSETRON 4 MG/1
4 TABLET, ORALLY DISINTEGRATING ORAL EVERY 6 HOURS PRN
Status: DISCONTINUED | OUTPATIENT
Start: 2024-11-16 | End: 2024-11-19 | Stop reason: HOSPADM

## 2024-11-16 RX ORDER — NITROGLYCERIN 0.4 MG/1
0.4 TABLET SUBLINGUAL
Status: DISCONTINUED | OUTPATIENT
Start: 2024-11-16 | End: 2024-11-19 | Stop reason: HOSPADM

## 2024-11-16 RX ORDER — KETOROLAC TROMETHAMINE 15 MG/ML
15 INJECTION, SOLUTION INTRAMUSCULAR; INTRAVENOUS ONCE
Status: COMPLETED | OUTPATIENT
Start: 2024-11-16 | End: 2024-11-16

## 2024-11-16 RX ORDER — ACETAMINOPHEN 160 MG/5ML
650 SOLUTION ORAL EVERY 4 HOURS PRN
Status: DISCONTINUED | OUTPATIENT
Start: 2024-11-16 | End: 2024-11-17

## 2024-11-16 RX ORDER — IOPAMIDOL 612 MG/ML
100 INJECTION, SOLUTION INTRAVASCULAR
Status: COMPLETED | OUTPATIENT
Start: 2024-11-16 | End: 2024-11-16

## 2024-11-16 RX ORDER — TAMSULOSIN HYDROCHLORIDE 0.4 MG/1
0.4 CAPSULE ORAL NIGHTLY
Status: DISCONTINUED | OUTPATIENT
Start: 2024-11-16 | End: 2024-11-19 | Stop reason: HOSPADM

## 2024-11-16 RX ORDER — ACETAMINOPHEN 650 MG/1
650 SUPPOSITORY RECTAL EVERY 4 HOURS PRN
Status: DISCONTINUED | OUTPATIENT
Start: 2024-11-16 | End: 2024-11-17

## 2024-11-16 RX ORDER — BISACODYL 5 MG/1
5 TABLET, DELAYED RELEASE ORAL DAILY PRN
Status: DISCONTINUED | OUTPATIENT
Start: 2024-11-16 | End: 2024-11-19 | Stop reason: HOSPADM

## 2024-11-16 RX ORDER — ONDANSETRON 2 MG/ML
4 INJECTION INTRAMUSCULAR; INTRAVENOUS EVERY 6 HOURS PRN
Status: DISCONTINUED | OUTPATIENT
Start: 2024-11-16 | End: 2024-11-19 | Stop reason: HOSPADM

## 2024-11-16 RX ORDER — AMOXICILLIN 250 MG
2 CAPSULE ORAL 2 TIMES DAILY
Status: DISCONTINUED | OUTPATIENT
Start: 2024-11-16 | End: 2024-11-19 | Stop reason: HOSPADM

## 2024-11-16 RX ORDER — AMOXICILLIN 500 MG/1
TABLET, FILM COATED ORAL
COMMUNITY
Start: 2024-10-24 | End: 2024-11-19 | Stop reason: HOSPADM

## 2024-11-16 RX ADMIN — IOPAMIDOL 100 ML: 612 INJECTION, SOLUTION INTRAVENOUS at 20:08

## 2024-11-16 RX ADMIN — SODIUM CHLORIDE 1000 ML: 9 INJECTION, SOLUTION INTRAVENOUS at 19:29

## 2024-11-16 RX ADMIN — SODIUM CHLORIDE 2000 MG: 900 INJECTION INTRAVENOUS at 20:58

## 2024-11-16 RX ADMIN — KETOROLAC TROMETHAMINE 15 MG: 15 INJECTION, SOLUTION INTRAMUSCULAR; INTRAVENOUS at 19:07

## 2024-11-16 RX ADMIN — HYDROMORPHONE HYDROCHLORIDE 1 MG: 1 INJECTION, SOLUTION INTRAMUSCULAR; INTRAVENOUS; SUBCUTANEOUS at 19:29

## 2024-11-17 ENCOUNTER — ANESTHESIA (OUTPATIENT)
Dept: PERIOP | Facility: HOSPITAL | Age: 39
End: 2024-11-17
Payer: COMMERCIAL

## 2024-11-17 ENCOUNTER — ANESTHESIA EVENT (OUTPATIENT)
Dept: PERIOP | Facility: HOSPITAL | Age: 39
End: 2024-11-17
Payer: COMMERCIAL

## 2024-11-17 ENCOUNTER — APPOINTMENT (OUTPATIENT)
Dept: GENERAL RADIOLOGY | Facility: HOSPITAL | Age: 39
DRG: 660 | End: 2024-11-17
Payer: COMMERCIAL

## 2024-11-17 LAB
ANION GAP SERPL CALCULATED.3IONS-SCNC: 9 MMOL/L (ref 5–15)
BASOPHILS # BLD AUTO: 0.01 10*3/MM3 (ref 0–0.2)
BASOPHILS NFR BLD AUTO: 0.1 % (ref 0–1.5)
BUN SERPL-MCNC: 12 MG/DL (ref 6–20)
BUN/CREAT SERPL: 20 (ref 7–25)
CALCIUM SPEC-SCNC: 7.8 MG/DL (ref 8.6–10.5)
CHLORIDE SERPL-SCNC: 105 MMOL/L (ref 98–107)
CO2 SERPL-SCNC: 22 MMOL/L (ref 22–29)
CREAT SERPL-MCNC: 0.6 MG/DL (ref 0.57–1)
DEPRECATED RDW RBC AUTO: 45.7 FL (ref 37–54)
EGFRCR SERPLBLD CKD-EPI 2021: 118 ML/MIN/1.73
EOSINOPHIL # BLD AUTO: 0.22 10*3/MM3 (ref 0–0.4)
EOSINOPHIL NFR BLD AUTO: 3.1 % (ref 0.3–6.2)
ERYTHROCYTE [DISTWIDTH] IN BLOOD BY AUTOMATED COUNT: 13.2 % (ref 12.3–15.4)
GLUCOSE SERPL-MCNC: 114 MG/DL (ref 65–99)
HCT VFR BLD AUTO: 34.7 % (ref 34–46.6)
HGB BLD-MCNC: 11.2 G/DL (ref 12–15.9)
IMM GRANULOCYTES # BLD AUTO: 0.01 10*3/MM3 (ref 0–0.05)
IMM GRANULOCYTES NFR BLD AUTO: 0.1 % (ref 0–0.5)
LYMPHOCYTES # BLD AUTO: 2.15 10*3/MM3 (ref 0.7–3.1)
LYMPHOCYTES NFR BLD AUTO: 30.6 % (ref 19.6–45.3)
MAGNESIUM SERPL-MCNC: 2.1 MG/DL (ref 1.6–2.6)
MCH RBC QN AUTO: 30.2 PG (ref 26.6–33)
MCHC RBC AUTO-ENTMCNC: 32.3 G/DL (ref 31.5–35.7)
MCV RBC AUTO: 93.5 FL (ref 79–97)
MONOCYTES # BLD AUTO: 0.59 10*3/MM3 (ref 0.1–0.9)
MONOCYTES NFR BLD AUTO: 8.4 % (ref 5–12)
NEUTROPHILS NFR BLD AUTO: 4.04 10*3/MM3 (ref 1.7–7)
NEUTROPHILS NFR BLD AUTO: 57.7 % (ref 42.7–76)
NRBC BLD AUTO-RTO: 0 /100 WBC (ref 0–0.2)
PLATELET # BLD AUTO: 190 10*3/MM3 (ref 140–450)
PMV BLD AUTO: 10.3 FL (ref 6–12)
POTASSIUM SERPL-SCNC: 3.8 MMOL/L (ref 3.5–5.2)
RBC # BLD AUTO: 3.71 10*6/MM3 (ref 3.77–5.28)
SODIUM SERPL-SCNC: 136 MMOL/L (ref 136–145)
WBC NRBC COR # BLD AUTO: 7.02 10*3/MM3 (ref 3.4–10.8)

## 2024-11-17 PROCEDURE — 25010000002 HYDROMORPHONE PER 4 MG: Performed by: UROLOGY

## 2024-11-17 PROCEDURE — 25010000002 FENTANYL CITRATE (PF) 100 MCG/2ML SOLUTION: Performed by: NURSE ANESTHETIST, CERTIFIED REGISTERED

## 2024-11-17 PROCEDURE — 25010000002 ONDANSETRON PER 1 MG: Performed by: NURSE ANESTHETIST, CERTIFIED REGISTERED

## 2024-11-17 PROCEDURE — 82365 CALCULUS SPECTROSCOPY: CPT | Performed by: UROLOGY

## 2024-11-17 PROCEDURE — C2617 STENT, NON-COR, TEM W/O DEL: HCPCS | Performed by: UROLOGY

## 2024-11-17 PROCEDURE — 99232 SBSQ HOSP IP/OBS MODERATE 35: CPT | Performed by: STUDENT IN AN ORGANIZED HEALTH CARE EDUCATION/TRAINING PROGRAM

## 2024-11-17 PROCEDURE — 25010000002 LIDOCAINE PF 1% 1 % SOLUTION: Performed by: NURSE ANESTHETIST, CERTIFIED REGISTERED

## 2024-11-17 PROCEDURE — 25810000003 LACTATED RINGERS PER 1000 ML: Performed by: NURSE ANESTHETIST, CERTIFIED REGISTERED

## 2024-11-17 PROCEDURE — 85025 COMPLETE CBC W/AUTO DIFF WBC: CPT | Performed by: NURSE PRACTITIONER

## 2024-11-17 PROCEDURE — C1758 CATHETER, URETERAL: HCPCS | Performed by: UROLOGY

## 2024-11-17 PROCEDURE — 80048 BASIC METABOLIC PNL TOTAL CA: CPT | Performed by: NURSE PRACTITIONER

## 2024-11-17 PROCEDURE — 0TC78ZZ EXTIRPATION OF MATTER FROM LEFT URETER, VIA NATURAL OR ARTIFICIAL OPENING ENDOSCOPIC: ICD-10-PCS | Performed by: UROLOGY

## 2024-11-17 PROCEDURE — 0T778DZ DILATION OF LEFT URETER WITH INTRALUMINAL DEVICE, VIA NATURAL OR ARTIFICIAL OPENING ENDOSCOPIC: ICD-10-PCS | Performed by: UROLOGY

## 2024-11-17 PROCEDURE — C1769 GUIDE WIRE: HCPCS | Performed by: UROLOGY

## 2024-11-17 PROCEDURE — 76000 FLUOROSCOPY <1 HR PHYS/QHP: CPT

## 2024-11-17 PROCEDURE — C1894 INTRO/SHEATH, NON-LASER: HCPCS | Performed by: UROLOGY

## 2024-11-17 PROCEDURE — 25810000003 LACTATED RINGERS PER 1000 ML: Performed by: NURSE PRACTITIONER

## 2024-11-17 PROCEDURE — 25010000002 HYDROMORPHONE PER 4 MG: Performed by: NURSE PRACTITIONER

## 2024-11-17 PROCEDURE — 25010000002 CEFTRIAXONE PER 250 MG: Performed by: UROLOGY

## 2024-11-17 PROCEDURE — 25010000002 FENTANYL CITRATE (PF) 50 MCG/ML SOLUTION

## 2024-11-17 PROCEDURE — G0378 HOSPITAL OBSERVATION PER HR: HCPCS

## 2024-11-17 PROCEDURE — 25010000002 DEXAMETHASONE PER 1 MG: Performed by: NURSE ANESTHETIST, CERTIFIED REGISTERED

## 2024-11-17 PROCEDURE — 83735 ASSAY OF MAGNESIUM: CPT | Performed by: NURSE PRACTITIONER

## 2024-11-17 PROCEDURE — 25010000002 PROPOFOL 10 MG/ML EMULSION: Performed by: NURSE ANESTHETIST, CERTIFIED REGISTERED

## 2024-11-17 DEVICE — URETERAL STENT
Type: IMPLANTABLE DEVICE | Site: URETER | Status: FUNCTIONAL
Brand: PERCUFLEX™ PLUS

## 2024-11-17 RX ORDER — FENTANYL CITRATE 50 UG/ML
50 INJECTION, SOLUTION INTRAMUSCULAR; INTRAVENOUS
Status: DISCONTINUED | OUTPATIENT
Start: 2024-11-17 | End: 2024-11-17 | Stop reason: HOSPADM

## 2024-11-17 RX ORDER — LIDOCAINE HYDROCHLORIDE 10 MG/ML
INJECTION, SOLUTION EPIDURAL; INFILTRATION; INTRACAUDAL; PERINEURAL AS NEEDED
Status: DISCONTINUED | OUTPATIENT
Start: 2024-11-17 | End: 2024-11-17 | Stop reason: SURG

## 2024-11-17 RX ORDER — PROMETHAZINE HYDROCHLORIDE 25 MG/1
25 SUPPOSITORY RECTAL ONCE AS NEEDED
Status: DISCONTINUED | OUTPATIENT
Start: 2024-11-17 | End: 2024-11-17 | Stop reason: HOSPADM

## 2024-11-17 RX ORDER — PROMETHAZINE HYDROCHLORIDE 25 MG/1
25 TABLET ORAL ONCE AS NEEDED
Status: DISCONTINUED | OUTPATIENT
Start: 2024-11-17 | End: 2024-11-17 | Stop reason: HOSPADM

## 2024-11-17 RX ORDER — SODIUM CHLORIDE 0.9 % (FLUSH) 0.9 %
3 SYRINGE (ML) INJECTION EVERY 12 HOURS SCHEDULED
Status: DISCONTINUED | OUTPATIENT
Start: 2024-11-17 | End: 2024-11-17 | Stop reason: HOSPADM

## 2024-11-17 RX ORDER — LIDOCAINE HYDROCHLORIDE 20 MG/ML
JELLY TOPICAL AS NEEDED
Status: DISCONTINUED | OUTPATIENT
Start: 2024-11-17 | End: 2024-11-17 | Stop reason: HOSPADM

## 2024-11-17 RX ORDER — DROPERIDOL 2.5 MG/ML
0.62 INJECTION, SOLUTION INTRAMUSCULAR; INTRAVENOUS ONCE AS NEEDED
Status: DISCONTINUED | OUTPATIENT
Start: 2024-11-17 | End: 2024-11-17 | Stop reason: HOSPADM

## 2024-11-17 RX ORDER — EPHEDRINE SULFATE 50 MG/ML
INJECTION INTRAVENOUS AS NEEDED
Status: DISCONTINUED | OUTPATIENT
Start: 2024-11-17 | End: 2024-11-17 | Stop reason: SURG

## 2024-11-17 RX ORDER — MAGNESIUM HYDROXIDE 1200 MG/15ML
LIQUID ORAL AS NEEDED
Status: DISCONTINUED | OUTPATIENT
Start: 2024-11-17 | End: 2024-11-17 | Stop reason: HOSPADM

## 2024-11-17 RX ORDER — HYDROCODONE BITARTRATE AND ACETAMINOPHEN 7.5; 325 MG/1; MG/1
1 TABLET ORAL EVERY 6 HOURS PRN
Status: DISCONTINUED | OUTPATIENT
Start: 2024-11-17 | End: 2024-11-19 | Stop reason: HOSPADM

## 2024-11-17 RX ORDER — PROPOFOL 10 MG/ML
VIAL (ML) INTRAVENOUS AS NEEDED
Status: DISCONTINUED | OUTPATIENT
Start: 2024-11-17 | End: 2024-11-17 | Stop reason: SURG

## 2024-11-17 RX ORDER — ACETAMINOPHEN 500 MG
500 TABLET ORAL EVERY 6 HOURS
Status: DISCONTINUED | OUTPATIENT
Start: 2024-11-17 | End: 2024-11-19 | Stop reason: HOSPADM

## 2024-11-17 RX ORDER — FENTANYL CITRATE 50 UG/ML
INJECTION, SOLUTION INTRAMUSCULAR; INTRAVENOUS AS NEEDED
Status: DISCONTINUED | OUTPATIENT
Start: 2024-11-17 | End: 2024-11-17 | Stop reason: SURG

## 2024-11-17 RX ORDER — SODIUM CHLORIDE, SODIUM LACTATE, POTASSIUM CHLORIDE, CALCIUM CHLORIDE 600; 310; 30; 20 MG/100ML; MG/100ML; MG/100ML; MG/100ML
INJECTION, SOLUTION INTRAVENOUS CONTINUOUS PRN
Status: DISCONTINUED | OUTPATIENT
Start: 2024-11-17 | End: 2024-11-17 | Stop reason: SURG

## 2024-11-17 RX ORDER — IPRATROPIUM BROMIDE AND ALBUTEROL SULFATE 2.5; .5 MG/3ML; MG/3ML
3 SOLUTION RESPIRATORY (INHALATION) ONCE AS NEEDED
Status: DISCONTINUED | OUTPATIENT
Start: 2024-11-17 | End: 2024-11-17 | Stop reason: HOSPADM

## 2024-11-17 RX ORDER — LIDOCAINE HYDROCHLORIDE 10 MG/ML
0.5 INJECTION, SOLUTION EPIDURAL; INFILTRATION; INTRACAUDAL; PERINEURAL ONCE AS NEEDED
Status: CANCELLED | OUTPATIENT
Start: 2024-11-17

## 2024-11-17 RX ORDER — DEXAMETHASONE SODIUM PHOSPHATE 4 MG/ML
INJECTION, SOLUTION INTRA-ARTICULAR; INTRALESIONAL; INTRAMUSCULAR; INTRAVENOUS; SOFT TISSUE AS NEEDED
Status: DISCONTINUED | OUTPATIENT
Start: 2024-11-17 | End: 2024-11-17 | Stop reason: SURG

## 2024-11-17 RX ORDER — SODIUM CHLORIDE 0.9 % (FLUSH) 0.9 %
10 SYRINGE (ML) INJECTION AS NEEDED
Status: CANCELLED | OUTPATIENT
Start: 2024-11-17

## 2024-11-17 RX ORDER — FAMOTIDINE 10 MG/ML
20 INJECTION, SOLUTION INTRAVENOUS ONCE
Status: CANCELLED | OUTPATIENT
Start: 2024-11-17 | End: 2024-11-17

## 2024-11-17 RX ORDER — HYDROMORPHONE HYDROCHLORIDE 1 MG/ML
0.5 INJECTION, SOLUTION INTRAMUSCULAR; INTRAVENOUS; SUBCUTANEOUS
Status: DISCONTINUED | OUTPATIENT
Start: 2024-11-17 | End: 2024-11-17 | Stop reason: HOSPADM

## 2024-11-17 RX ORDER — MIDAZOLAM HYDROCHLORIDE 1 MG/ML
1 INJECTION, SOLUTION INTRAMUSCULAR; INTRAVENOUS
Status: CANCELLED | OUTPATIENT
Start: 2024-11-17

## 2024-11-17 RX ORDER — SODIUM CHLORIDE 9 MG/ML
9 INJECTION, SOLUTION INTRAVENOUS AS NEEDED
Status: DISCONTINUED | OUTPATIENT
Start: 2024-11-17 | End: 2024-11-17 | Stop reason: HOSPADM

## 2024-11-17 RX ORDER — LIDOCAINE 4 G/G
1 PATCH TOPICAL
Status: DISCONTINUED | OUTPATIENT
Start: 2024-11-17 | End: 2024-11-19 | Stop reason: HOSPADM

## 2024-11-17 RX ORDER — HYDRALAZINE HYDROCHLORIDE 20 MG/ML
5 INJECTION INTRAMUSCULAR; INTRAVENOUS
Status: DISCONTINUED | OUTPATIENT
Start: 2024-11-17 | End: 2024-11-17 | Stop reason: HOSPADM

## 2024-11-17 RX ORDER — SODIUM CHLORIDE, SODIUM LACTATE, POTASSIUM CHLORIDE, CALCIUM CHLORIDE 600; 310; 30; 20 MG/100ML; MG/100ML; MG/100ML; MG/100ML
9 INJECTION, SOLUTION INTRAVENOUS CONTINUOUS
Status: CANCELLED | OUTPATIENT
Start: 2024-11-18 | End: 2024-11-18

## 2024-11-17 RX ORDER — NALOXONE HCL 0.4 MG/ML
0.4 VIAL (ML) INJECTION AS NEEDED
Status: DISCONTINUED | OUTPATIENT
Start: 2024-11-17 | End: 2024-11-17 | Stop reason: HOSPADM

## 2024-11-17 RX ORDER — SODIUM CHLORIDE, SODIUM LACTATE, POTASSIUM CHLORIDE, CALCIUM CHLORIDE 600; 310; 30; 20 MG/100ML; MG/100ML; MG/100ML; MG/100ML
9 INJECTION, SOLUTION INTRAVENOUS CONTINUOUS
Status: ACTIVE | OUTPATIENT
Start: 2024-11-17 | End: 2024-11-18

## 2024-11-17 RX ORDER — DROPERIDOL 2.5 MG/ML
0.62 INJECTION, SOLUTION INTRAMUSCULAR; INTRAVENOUS
Status: DISCONTINUED | OUTPATIENT
Start: 2024-11-17 | End: 2024-11-17 | Stop reason: HOSPADM

## 2024-11-17 RX ORDER — HYDROCODONE BITARTRATE AND ACETAMINOPHEN 5; 325 MG/1; MG/1
1 TABLET ORAL ONCE AS NEEDED
Status: DISCONTINUED | OUTPATIENT
Start: 2024-11-17 | End: 2024-11-17 | Stop reason: HOSPADM

## 2024-11-17 RX ORDER — FENTANYL CITRATE 50 UG/ML
INJECTION, SOLUTION INTRAMUSCULAR; INTRAVENOUS
Status: COMPLETED
Start: 2024-11-17 | End: 2024-11-17

## 2024-11-17 RX ORDER — ONDANSETRON 2 MG/ML
INJECTION INTRAMUSCULAR; INTRAVENOUS AS NEEDED
Status: DISCONTINUED | OUTPATIENT
Start: 2024-11-17 | End: 2024-11-17 | Stop reason: SURG

## 2024-11-17 RX ORDER — LABETALOL HYDROCHLORIDE 5 MG/ML
5 INJECTION, SOLUTION INTRAVENOUS
Status: DISCONTINUED | OUTPATIENT
Start: 2024-11-17 | End: 2024-11-17 | Stop reason: HOSPADM

## 2024-11-17 RX ORDER — FAMOTIDINE 20 MG/1
20 TABLET, FILM COATED ORAL ONCE
Status: CANCELLED | OUTPATIENT
Start: 2024-11-17 | End: 2024-11-17

## 2024-11-17 RX ORDER — SODIUM CHLORIDE 0.9 % (FLUSH) 0.9 %
3-10 SYRINGE (ML) INJECTION AS NEEDED
Status: DISCONTINUED | OUTPATIENT
Start: 2024-11-17 | End: 2024-11-17 | Stop reason: HOSPADM

## 2024-11-17 RX ORDER — ONDANSETRON 2 MG/ML
4 INJECTION INTRAMUSCULAR; INTRAVENOUS ONCE AS NEEDED
Status: DISCONTINUED | OUTPATIENT
Start: 2024-11-17 | End: 2024-11-17 | Stop reason: HOSPADM

## 2024-11-17 RX ORDER — SODIUM CHLORIDE 0.9 % (FLUSH) 0.9 %
10 SYRINGE (ML) INJECTION EVERY 12 HOURS SCHEDULED
Status: CANCELLED | OUTPATIENT
Start: 2024-11-17

## 2024-11-17 RX ADMIN — FENTANYL CITRATE 50 MCG: 50 INJECTION, SOLUTION INTRAMUSCULAR; INTRAVENOUS at 10:40

## 2024-11-17 RX ADMIN — HYDROMORPHONE HYDROCHLORIDE 0.5 MG: 1 INJECTION, SOLUTION INTRAMUSCULAR; INTRAVENOUS; SUBCUTANEOUS at 00:04

## 2024-11-17 RX ADMIN — HYDROCODONE BITARTRATE AND ACETAMINOPHEN 1 TABLET: 7.5; 325 TABLET ORAL at 14:20

## 2024-11-17 RX ADMIN — HYDROMORPHONE HYDROCHLORIDE 0.5 MG: 1 INJECTION, SOLUTION INTRAMUSCULAR; INTRAVENOUS; SUBCUTANEOUS at 23:29

## 2024-11-17 RX ADMIN — HYDROMORPHONE HYDROCHLORIDE 0.5 MG: 1 INJECTION, SOLUTION INTRAMUSCULAR; INTRAVENOUS; SUBCUTANEOUS at 15:38

## 2024-11-17 RX ADMIN — Medication 5 MG: at 00:04

## 2024-11-17 RX ADMIN — PROPOFOL 200 MG: 10 INJECTION, EMULSION INTRAVENOUS at 09:37

## 2024-11-17 RX ADMIN — SODIUM CHLORIDE, POTASSIUM CHLORIDE, SODIUM LACTATE AND CALCIUM CHLORIDE 9 ML/HR: 600; 310; 30; 20 INJECTION, SOLUTION INTRAVENOUS at 11:38

## 2024-11-17 RX ADMIN — LIDOCAINE HYDROCHLORIDE 50 MG: 10 INJECTION, SOLUTION EPIDURAL; INFILTRATION; INTRACAUDAL; PERINEURAL at 09:37

## 2024-11-17 RX ADMIN — DEXAMETHASONE SODIUM PHOSPHATE 4 MG: 4 INJECTION INTRA-ARTICULAR; INTRALESIONAL; INTRAMUSCULAR; INTRAVENOUS; SOFT TISSUE at 09:46

## 2024-11-17 RX ADMIN — EPHEDRINE SULFATE 5 MG: 50 INJECTION INTRAVENOUS at 09:49

## 2024-11-17 RX ADMIN — FAMOTIDINE 40 MG: 20 TABLET, FILM COATED ORAL at 08:29

## 2024-11-17 RX ADMIN — Medication 10 ML: at 00:13

## 2024-11-17 RX ADMIN — HYDROMORPHONE HYDROCHLORIDE 0.5 MG: 1 INJECTION, SOLUTION INTRAMUSCULAR; INTRAVENOUS; SUBCUTANEOUS at 02:27

## 2024-11-17 RX ADMIN — LIDOCAINE 1 PATCH: 4 PATCH TOPICAL at 14:21

## 2024-11-17 RX ADMIN — Medication 5 MG: at 21:59

## 2024-11-17 RX ADMIN — Medication 10 ML: at 20:34

## 2024-11-17 RX ADMIN — TAMSULOSIN HYDROCHLORIDE 0.4 MG: 0.4 CAPSULE ORAL at 20:25

## 2024-11-17 RX ADMIN — HYDROMORPHONE HYDROCHLORIDE 0.5 MG: 1 INJECTION, SOLUTION INTRAMUSCULAR; INTRAVENOUS; SUBCUTANEOUS at 11:36

## 2024-11-17 RX ADMIN — SODIUM CHLORIDE 2000 MG: 900 INJECTION INTRAVENOUS at 20:33

## 2024-11-17 RX ADMIN — EPHEDRINE SULFATE 5 MG: 50 INJECTION INTRAVENOUS at 10:04

## 2024-11-17 RX ADMIN — ACETAMINOPHEN 500 MG: 500 TABLET, FILM COATED ORAL at 14:20

## 2024-11-17 RX ADMIN — EPHEDRINE SULFATE 5 MG: 50 INJECTION INTRAVENOUS at 10:15

## 2024-11-17 RX ADMIN — FENTANYL CITRATE 100 MCG: 50 INJECTION, SOLUTION INTRAMUSCULAR; INTRAVENOUS at 09:37

## 2024-11-17 RX ADMIN — Medication 10 ML: at 08:30

## 2024-11-17 RX ADMIN — SENNOSIDES AND DOCUSATE SODIUM 2 TABLET: 50; 8.6 TABLET ORAL at 00:04

## 2024-11-17 RX ADMIN — TAMSULOSIN HYDROCHLORIDE 0.4 MG: 0.4 CAPSULE ORAL at 00:04

## 2024-11-17 RX ADMIN — ONDANSETRON 4 MG: 2 INJECTION INTRAMUSCULAR; INTRAVENOUS at 09:46

## 2024-11-17 RX ADMIN — HYDROMORPHONE HYDROCHLORIDE 0.5 MG: 1 INJECTION, SOLUTION INTRAMUSCULAR; INTRAVENOUS; SUBCUTANEOUS at 04:17

## 2024-11-17 RX ADMIN — HYDROCODONE BITARTRATE AND ACETAMINOPHEN 1 TABLET: 5; 325 TABLET ORAL at 05:33

## 2024-11-17 RX ADMIN — HYDROMORPHONE HYDROCHLORIDE 0.5 MG: 1 INJECTION, SOLUTION INTRAMUSCULAR; INTRAVENOUS; SUBCUTANEOUS at 20:33

## 2024-11-17 RX ADMIN — HYDROMORPHONE HYDROCHLORIDE 0.5 MG: 1 INJECTION, SOLUTION INTRAMUSCULAR; INTRAVENOUS; SUBCUTANEOUS at 17:38

## 2024-11-17 RX ADMIN — SODIUM CHLORIDE, POTASSIUM CHLORIDE, SODIUM LACTATE AND CALCIUM CHLORIDE 100 ML/HR: 600; 310; 30; 20 INJECTION, SOLUTION INTRAVENOUS at 00:06

## 2024-11-17 RX ADMIN — SODIUM CHLORIDE, POTASSIUM CHLORIDE, SODIUM LACTATE AND CALCIUM CHLORIDE: 600; 310; 30; 20 INJECTION, SOLUTION INTRAVENOUS at 09:29

## 2024-11-17 RX ADMIN — HYDROMORPHONE HYDROCHLORIDE 0.5 MG: 1 INJECTION, SOLUTION INTRAMUSCULAR; INTRAVENOUS; SUBCUTANEOUS at 08:29

## 2024-11-17 RX ADMIN — HYDROCODONE BITARTRATE AND ACETAMINOPHEN 1 TABLET: 7.5; 325 TABLET ORAL at 21:59

## 2024-11-17 NOTE — CONSULTS
Consult    Patient Name: Marta MCCOY [facility]  Medical Record Number: [id number]  YOB: 1985    Date of consultation: 2024    Referring Provider:No ref. provider found Bekah CLAIRE and Shiva Marsh MD  Reason for Consultation: Left ureteral stone, possible UTI    Patient Care Team:  Paolo Bhandari MD as PCP - General (Family Medicine)    Chief complaint   Chief Complaint   Patient presents with    Flank Pain       Subjective .     History of present illness:    39-year-old white female with a history of UTIs, pyelonephritis, and urolithiasis.  She was admitted at [facility] in  for pyelonephritis.  She underwent stone extraction 2024 at .    She was admitted for obstructing left ureteral stone and possible UTI.  She presented to [facility] with left renal colic, nausea, and chills.  A CT scan showed a 5 mm left proximal ureteral stone, bilateral tiny stones, and left lower pole cyst.  Labs included creatinine 0.69, WBC 10.97, lactate 1.1, procalcitonin 0.04, and urinalysis nitrite positive, WBC TNTC, RBC 0-2, bacteria 1+.  She started Rocephin and tamsulosin.    She still has left-sided abdominal and flank pain which is a little better with pain medication.  Her  is at the bedside.    Past Medical History:   Diagnosis Date    Cellulitis     Kidney stones     Obesity     Skin lesion of breast     URI (upper respiratory infection)      Past Surgical History:   Procedure Laterality Date    APPENDECTOMY      TONSILLECTOMY      TUBAL ABDOMINAL LIGATION       Family History   Problem Relation Age of Onset    No Known Problems Mother     Hypertension Father     Diabetes Other     Heart disease Other     Hypertension Other      Social History     Tobacco Use    Smoking status: Every Day     Current packs/day: 0.00     Types: Cigarettes     Start date:      Last attempt to quit: 2016     Years since quittin.8    Smokeless tobacco: Never   Vaping Use    Vaping status: Never  "Used   Substance Use Topics    Alcohol use: No    Drug use: No     Medications Prior to Admission   Medication Sig Dispense Refill Last Dose/Taking    acetaminophen (TYLENOL) 500 MG tablet Take 2 tablets by mouth Every 6 (Six) Hours As Needed for Mild Pain.   11/16/2024 Morning    amoxicillin (AMOXIL) 500 MG tablet TAKE 1 TABLET BY MOUTH THREE TIMES DAILY UNTIL GONE   Taking    ibuprofen (ADVIL,MOTRIN) 600 MG tablet Take 1 tablet by mouth Every 6 (Six) Hours As Needed.   Taking As Needed    phenazopyridine (PYRIDIUM) 200 MG tablet Take 1 tablet by mouth 3 (Three) Times a Day As Needed for Bladder Spasms. 9 tablet 0 Taking As Needed     Allergies:  Patient has no known allergies.    Review of Systems  The following systems were reviewed and negative;  respiratory, cardiovascular, musculoskeletal, neurological, and behavioral/psych    Objective     Vital Signs   BP 96/51 (BP Location: Right arm, Patient Position: Lying)   Pulse 78   Temp 98.4 °F (36.9 °C) (Axillary)   Resp 18   Ht 162.6 cm (64\")   Wt 112 kg (247 lb 9.6 oz)   SpO2 96%   BMI 42.50 kg/m²     Physical Exam:  General Appearance: No acute distress, sitting up in bed, pleasant, appears uncomfortable, obese  Lungs: Respirations regular, even and  unlabored  Heart: Regular rhythm and normal rate  Neurologic: Neurologically grossly intact    Results Review:  Lab Results (last 24 hours)       Procedure Component Value Units Date/Time    Procalcitonin [[id number]]  (Normal) Collected: 11/16/24 1855    Specimen: Blood Updated: 11/16/24 2054     Procalcitonin 0.04 ng/mL     Lactic Acid, Plasma [[id number]]  (Normal) Collected: 11/16/24 1855    Specimen: Blood Updated: 11/16/24 2046     Lactate 1.1 mmol/L     Urine Culture - Urine, Urine, Clean Catch [[id number]] Collected: 11/16/24 1855    Specimen: Urine, Clean Catch Updated: 11/16/24 2032    hCG, Quantitative, Pregnancy [[id number]] Collected: 11/16/24 1855    Specimen: Blood Updated: 11/16/24 1931     HCG " Quantitative <0.20 mIU/mL     Narrative:      HCG Ranges by Gestational Age    Females - non-pregnant premenopausal   </= 1mIU/mL HCG  Females - postmenopausal               </= 7mIU/mL HCG    3 Weeks         5.8 -    71.2 mIU/mL  4 Weeks         9.5 -     750 mIU/mL  5 Weeks         217 -   7,138 mIU/mL  6 Weeks         158 -  31,795 mIU/mL  7 Weeks       3,697 - 163,563 mIU/mL  8 Weeks      32,065 - 149,571 mIU/mL  9 Weeks      63,803 - 151,410 mIU/mL  10 Weeks     46,509 - 186,977 mIU/mL  12 Weeks     27,832 - 210,612 mIU/mL  14 Weeks     13,950 -  62,530 mIU/mL  15 Weeks     12,039 -  70,971 mIU/mL  16 Weeks      9,040 -  56,451 mIU/mL  17 Weeks      8,175 -  55,868 mIU/mL  18 Weeks      8,099 -  58,176 mIU/mL  Results may be falsely decreased if patient taking Biotin.      Comprehensive Metabolic Panel [[id number]] Collected: 11/16/24 1855    Specimen: Blood Updated: 11/16/24 1923     Glucose 98 mg/dL      BUN 11 mg/dL      Creatinine 0.69 mg/dL      Sodium 138 mmol/L      Potassium 3.8 mmol/L      Chloride 104 mmol/L      CO2 24.7 mmol/L      Calcium 9.0 mg/dL      Total Protein 7.0 g/dL      Albumin 4.1 g/dL      ALT (SGPT) 21 U/L      AST (SGOT) 23 U/L      Alkaline Phosphatase 94 U/L      Total Bilirubin 0.4 mg/dL      Globulin 2.9 gm/dL      A/G Ratio 1.4 g/dL      BUN/Creatinine Ratio 15.9     Anion Gap 9.3 mmol/L      eGFR 114.1 mL/min/1.73     Narrative:      GFR Normal >60  Chronic Kidney Disease <60  Kidney Failure <15      Urinalysis, Microscopic Only - Urine, Clean Catch [[id number]]  (Abnormal) Collected: 11/16/24 1855    Specimen: Urine, Clean Catch Updated: 11/16/24 1923     RBC, UA 0-2 /HPF      WBC, UA Too Numerous to Count /HPF      Bacteria, UA 1+ /HPF      Squamous Epithelial Cells, UA 3-6 /HPF      Hyaline Casts, UA None Seen /LPF      WBC Clumps, UA Small/1+ /HPF      Methodology Manual Light Microscopy    Lipase [[id number]]  (Normal) Collected: 11/16/24 3423    Specimen: Blood Updated:  11/16/24 1923     Lipase 25 U/L     [place] Draw [[id number]] Collected: 11/16/24 1855    Specimen: Blood Updated: 11/16/24 1916    Narrative:      The following orders were created for panel order [place] Draw.  Procedure                               Abnormality         Status                     ---------                               -----------         ------                     Green Top (Gel)[[id number]]                                  Final result               Lavender Top[[id number]]                                     Final result               Gold Top - SST[[id number]]                                   Final result               Carr Top[[id number]]                                         Final result               Light Blue Top[[id number]]                                   Final result                 Please view results for these tests on the individual orders.    Green Top (Gel) [[id number]] Collected: 11/16/24 1855    Specimen: Blood Updated: 11/16/24 1916     Extra Tube Hold for add-ons.     Comment: Auto resulted.       Lavender Top [[id number]] Collected: 11/16/24 1855    Specimen: Blood Updated: 11/16/24 1916     Extra Tube hold for add-on     Comment: Auto resulted       Gold Top - SST [[id number]] Collected: 11/16/24 1855    Specimen: Blood Updated: 11/16/24 1916     Extra Tube Hold for add-ons.     Comment: Auto resulted.       Gray Top [[id number]] Collected: 11/16/24 1855    Specimen: Blood Updated: 11/16/24 1916     Extra Tube Hold for add-ons.     Comment: Auto resulted.       Light Blue Top [[id number]] Collected: 11/16/24 1855    Specimen: Blood Updated: 11/16/24 1916     Extra Tube Hold for add-ons.     Comment: Auto resulted       Urinalysis With Microscopic If Indicated (No Culture) - Urine, Clean Catch [[id number]]  (Abnormal) Collected: 11/16/24 1855    Specimen: Urine, Clean Catch Updated: 11/16/24 1909     Color, UA Orange     Comment: Color of urine may affect dipstick results.          Appearance, UA Slightly Cloudy     pH, UA 5.5     Specific Gravity, UA 1.015     Glucose,  mg/dL (1+)     Ketones, UA Trace     Bilirubin, UA Negative     Blood, UA Small (1+)     Protein, UA >=300 mg/dL (3+)     Leuk Esterase, UA Moderate (2+)     Nitrite, UA Positive     Urobilinogen, UA >=8.0 E.U./dL    CBC & Differential [[id number]]  (Abnormal) Collected: 11/16/24 1855    Specimen: Blood Updated: 11/16/24 1905    Narrative:      The following orders were created for panel order CBC & Differential.  Procedure                               Abnormality         Status                     ---------                               -----------         ------                     CBC Auto Differential[[id number]]        Abnormal            Final result                 Please view results for these tests on the individual orders.    CBC Auto Differential [[id number]]  (Abnormal) Collected: 11/16/24 1855    Specimen: Blood Updated: 11/16/24 1905     WBC 10.97 10*3/mm3      RBC 4.53 10*6/mm3      Hemoglobin 13.5 g/dL      Hematocrit 40.5 %      MCV 89.4 fL      MCH 29.8 pg      MCHC 33.3 g/dL      RDW 12.9 %      RDW-SD 42.5 fl      MPV 10.4 fL      Platelets 243 10*3/mm3      Neutrophil % 76.1 %      Lymphocyte % 16.0 %      Monocyte % 6.1 %      Eosinophil % 1.5 %      Basophil % 0.2 %      Immature Grans % 0.1 %      Neutrophils, Absolute 8.35 10*3/mm3      Lymphocytes, Absolute 1.76 10*3/mm3      Monocytes, Absolute 0.67 10*3/mm3      Eosinophils, Absolute 0.16 10*3/mm3      Basophils, Absolute 0.02 10*3/mm3      Immature Grans, Absolute 0.01 10*3/mm3           Imaging Results (Last 72 Hours)       Procedure Component Value Units Date/Time    XR Chest 1 View [[id number]] Collected: 11/16/24 2339     Updated: 11/16/24 2343    Narrative:      XR CHEST 1 VW    Date of Exam: 11/16/2024 11:09 PM EST    Indication: pre op / leukcytosis    Comparison: 1/16/2020.    Findings:  There are no airspace consolidations. No pleural  fluid. No pneumothorax. The pulmonary vasculature appears within normal limits. The cardiac and mediastinal silhouette appear unremarkable. No acute osseous abnormality identified.      Impression:      Impression:  No acute cardiopulmonary process.      Electronically Signed: Angelita Garner MD    11/16/2024 11:40 PM EST    Workstation ID: [id number]    CT Abdomen Pelvis With Contrast [[id number]] Collected: 11/16/24 2012     Updated: 11/16/24 2020    Narrative:      CT ABDOMEN PELVIS W CONTRAST    Date of Exam: 11/16/2024 7:38 PM EST    Indication: Left flank pain.    Comparison: CT of the abdomen and pelvis dated 11/14/2020    Technique: Axial CT images were obtained of the abdomen and pelvis following the uneventful intravenous administration of 90 mL Isovue 300. Reconstructed coronal and sagittal images were also obtained. Automated exposure control and iterative   construction methods were used.  Findings:    Liver: The liver is unremarkable in morphology and decreased in attenuation. No focal liver lesion is seen. No biliary dilation is seen.    Gallbladder: Unremarkable.    Pancreas: Unremarkable.    Spleen: Unremarkable.    Adrenal glands: Unremarkable.    Genitourinary tract: 5 mm obstructing calculus within the left proximal ureter causes mild to moderate left hydronephrosis. There are multiple punctate nonobstructing calculi within both kidneys, ranging in size from 1 to 3 mm. No hydronephrosis on the   right. The visualized portion of the right ureter is unremarkable. Urinary bladder and pelvic organs demonstrate no acute abnormality.    Gastrointestinal tract: The visualized hollow viscera demonstrate no focal lesion. There is no evidence of bowel obstruction.    Appendix: The appendix is not identified.    Other findings: No free air or free fluid is identified. No pathologically enlarged lymph nodes are seen. The abdominal aorta and IVC appear unremarkable.    Bones and soft tissues: No acute or suspicious  osseous or soft tissue lesion is identified.    Lung bases: The visualized lung bases are clear.      Impression:      Impression:  1.5 mm obstructing calculus within the left proximal ureter causes mild to moderate left hydronephrosis.  2.Additional bilateral punctate nonobstructive nephrolithiasis.  3.Hepatic steatosis.  4.Additional findings as detailed above.      Electronically Signed: Maldonado Decker MD    11/16/2024 8:16 PM EST    Workstation ID: [id number]             I reviewed the patient's new clinical results.  I reviewed the patient's new imaging results and agree with the interpretation.      Assessment and Recommendations  39-year-old white female with a history of UTIs, pyelonephritis, and urolithiasis.  She was admitted at [facility] in 2020 for pyelonephritis.  She underwent stone extraction 5/2024 at .    She was admitted for obstructing left ureteral stone and possible UTI.  A CT scan with contrast 11/16/2024 at [facility] showed a 5 mm left proximal ureteral stone, bilateral tiny stones, and left lower pole cyst.  Labs included creatinine 0.69, WBC 10.97, lactate 1.1, procalcitonin 0.04, and urinalysis nitrite positive, WBC TNTC, RBC 0-2, bacteria 1+.  She started Rocephin and tamsulosin.    We discussed management of the left ureteral stone including trial of passage, ureteroscopic laser lithotripsy, or ESWL.  We agreed to proceed with left ureteroscopic laser lithotripsy today.  If there is purulent drainage, I would place a stent and defer laser lithotripsy for a later date.  We discussed the operative technique and postoperative expectations and care.  Potential risks include bleeding, infection, injury to the urethra, bladder, ureter, kidney, and anesthetic risks.  Their questions were answered.  She would like to proceed.    Plan: Continue hydration and pain control.  Continue broad-spectrum antibiotic pending cultures, currently Rocephin.  To the OR today for cystoscopy, left ureteroscopy, laser  lithotripsy, and stent placement.      Renal stone    Acute flank pain    Hydronephrosis, left      I discussed the patients findings and my recommendations with patient and family    Ton Harvey MD  11/17/24  08:46 EST

## 2024-11-17 NOTE — ANESTHESIA PROCEDURE NOTES
Airway  Urgency: elective    Date/Time: 11/17/2024 9:38 AM  Airway not difficult    General Information and Staff    Patient location during procedure: OR  Anesthesiologist: Racquel Gutierrez MD  CRNA/CAA: Emmanuelle Jacobs CRNA    Indications and Patient Condition  Indications for airway management: airway protection    Preoxygenated: yes  Mask difficulty assessment: 1 - vent by mask    Final Airway Details  Final airway type: supraglottic airway      Successful airway: I-gel  Size 4     Number of attempts at approach: 1  Assessment: lips, teeth, and gum same as pre-op    Additional Comments  LMA placed without difficulty, ventilation with assist, equal breath sounds and symmetric chest rise and fall

## 2024-11-17 NOTE — PROGRESS NOTES
[facility] Medicine Services  PROGRESS NOTE    Patient Name: Marta Amaya  : 1985  MRN: [id number]    Date of Admission: 2024  Primary Care Physician: Paolo Bhandari MD    Subjective   Subjective     CC:  Left flank pain left upper quadrant pain    HPI:  Patient seen resting comfortably in bed no acute distress after urology procedure.  Patient continues to have mild to moderate left flank pain.  Tolerating p.o. intake.      Objective   Objective     Vital Signs:   Temp:  [97.8 °F (36.6 °C)-99.6 °F (37.6 °C)] 98.4 °F (36.9 °C)  Heart Rate:  [] 106  Resp:  [16-22] 18  BP: ()/(51-98) 113/68  Flow (L/min) (Oxygen Therapy):  [2-3] 2     Physical Exam  Constitutional:       General: She is not in acute distress.  Eyes:      General: No scleral icterus.     Extraocular Movements: Extraocular movements intact.   Cardiovascular:      Rate and Rhythm: Normal rate.      Pulses: Normal pulses.   Pulmonary:      Effort: Pulmonary effort is normal. No respiratory distress.   Abdominal:      General: There is no distension.      Palpations: Abdomen is soft.      Tenderness: There is no abdominal tenderness. There is no guarding or rebound.      Comments: Reports left flank pain   Musculoskeletal:      Right lower leg: No edema.      Left lower leg: No edema.   Skin:     General: Skin is warm.   Neurological:      Mental Status: She is alert and oriented to person, place, and time.   Psychiatric:         Mood and Affect: Mood normal.         Behavior: Behavior normal.           Results Reviewed:  LAB RESULTS:      Lab 24  0902 24  1855   WBC 7.02 10.97*   HEMOGLOBIN 11.2* 13.5   HEMATOCRIT 34.7 40.5   PLATELETS 190 243   NEUTROS ABS 4.04 8.35*   IMMATURE GRANS (ABS) 0.01 0.01   LYMPHS ABS 2.15 1.76   MONOS ABS 0.59 0.67   EOS ABS 0.22 0.16   MCV 93.5 89.4   PROCALCITONIN  --  0.04   LACTATE  --  1.1         Lab 24  0902 24  1855   SODIUM 136 138    POTASSIUM 3.8 3.8   CHLORIDE 105 104   CO2 22.0 24.7   ANION GAP 9.0 9.3   BUN 12 11   CREATININE 0.60 0.69   EGFR 118.0 114.1   GLUCOSE 114* 98   CALCIUM 7.8* 9.0   MAGNESIUM 2.1  --          Lab 11/16/24  1855   TOTAL PROTEIN 7.0   ALBUMIN 4.1   GLOBULIN 2.9   ALT (SGPT) 21   AST (SGOT) 23   BILIRUBIN 0.4   ALK PHOS 94   LIPASE 25                     Brief Urine Lab Results  (Last result in the past 365 days)        Color   Clarity   Blood   Leuk Est   Nitrite   Protein   CREAT   Urine HCG        11/16/24 1855 Orange  Comment: Color of urine may affect dipstick results.    Slightly Cloudy   Small (1+)   Moderate (2+)   Positive   >=300 mg/dL (3+)                   Microbiology Results Abnormal       None            FL C Arm During Surgery    Result Date: 11/17/2024  This procedure was auto-finalized with no dictation required.    XR Chest 1 View    Result Date: 11/16/2024  XR CHEST 1 VW Date of Exam: 11/16/2024 11:09 PM EST Indication: pre op / leukcytosis Comparison: 1/16/2020. Findings: There are no airspace consolidations. No pleural fluid. No pneumothorax. The pulmonary vasculature appears within normal limits. The cardiac and mediastinal silhouette appear unremarkable. No acute osseous abnormality identified.     Impression: Impression: No acute cardiopulmonary process. Electronically Signed: Angelita Garner MD  11/16/2024 11:40 PM EST  Workstation ID: [id number]    CT Abdomen Pelvis With Contrast    Result Date: 11/16/2024  CT ABDOMEN PELVIS W CONTRAST Date of Exam: 11/16/2024 7:38 PM EST Indication: Left flank pain. Comparison: CT of the abdomen and pelvis dated 11/14/2020 Technique: Axial CT images were obtained of the abdomen and pelvis following the uneventful intravenous administration of 90 mL Isovue 300. Reconstructed coronal and sagittal images were also obtained. Automated exposure control and iterative construction methods were used. Findings: Liver: The liver is unremarkable in morphology and  decreased in attenuation. No focal liver lesion is seen. No biliary dilation is seen. Gallbladder: Unremarkable. Pancreas: Unremarkable. Spleen: Unremarkable. Adrenal glands: Unremarkable. Genitourinary tract: 5 mm obstructing calculus within the left proximal ureter causes mild to moderate left hydronephrosis. There are multiple punctate nonobstructing calculi within both kidneys, ranging in size from 1 to 3 mm. No hydronephrosis on the right. The visualized portion of the right ureter is unremarkable. Urinary bladder and pelvic organs demonstrate no acute abnormality. Gastrointestinal tract: The visualized hollow viscera demonstrate no focal lesion. There is no evidence of bowel obstruction. Appendix: The appendix is not identified. Other findings: No free air or free fluid is identified. No pathologically enlarged lymph nodes are seen. The abdominal aorta and IVC appear unremarkable. Bones and soft tissues: No acute or suspicious osseous or soft tissue lesion is identified. Lung bases: The visualized lung bases are clear.     Impression: Impression: 1.5 mm obstructing calculus within the left proximal ureter causes mild to moderate left hydronephrosis. 2.Additional bilateral punctate nonobstructive nephrolithiasis. 3.Hepatic steatosis. 4.Additional findings as detailed above. Electronically Signed: Maldonado Decker MD  11/16/2024 8:16 PM EST  Workstation ID: [id number]         Current medications:  Scheduled Meds:acetaminophen, 500 mg, Oral, Q6H  cefTRIAXone, 2,000 mg, Intravenous, Q24H  famotidine, 40 mg, Oral, Daily  Lidocaine, 1 patch, Transdermal, Q24H  senna-docusate sodium, 2 tablet, Oral, BID  sodium chloride, 10 mL, Intravenous, Q12H  tamsulosin, 0.4 mg, Oral, Nightly      Continuous Infusions:lactated ringers, 9 mL/hr, Last Rate: 9 mL/hr (11/17/24 1138)      PRN Meds:.  senna-docusate sodium **AND** polyethylene glycol **AND** bisacodyl **AND** bisacodyl    Calcium Replacement - Follow Nurse / BPA Driven  Protocol    HYDROcodone-acetaminophen    HYDROmorphone **AND** naloxone    Magnesium Standard Dose Replacement - Follow Nurse / BPA Driven Protocol    melatonin    nitroglycerin    ondansetron ODT **OR** ondansetron    Phosphorus Replacement - Follow Nurse / BPA Driven Protocol    Potassium Replacement - Follow Nurse / BPA Driven Protocol    Sodium Chloride (PF)    sodium chloride    sodium chloride    Assessment & Plan   Assessment & Plan     Active Hospital Problems    Diagnosis  POA    **Renal stone [N20.0]  Yes    Hydronephrosis, left [N13.30]  Yes    Acute flank pain [R10.9]  Yes      Resolved Hospital Problems   No resolved problems to display.        Brief Hospital Course to date:  Marta Amaya is a 39 y.o. female with past medical history of kidney stones, pyelonephritis status post right ureteral stent at [facility] on 5/10/2024 with removal on 5/20/2024 presented to [facility] ED with left flank pain and left upper quadrant pain.  CT abdomen pelvis revealing 5 mm obstructing calculus within the left proximal ureter causing mild to moderate left hydronephrosis.  Urology consulted, status post cystoscopy, left ureteroscopy, laser lithotripsy, basket stone extraction, ureteral stent placement on 11/17.  UA consistent with infection.  Currently on IV antibiotics and adjusting pain control.    5 mm obstructing calculus within the left proximal ureter  Complicated UTI  Leukocytosis   -Patient with left flank pain and left upper quadrant pain that started on 11/16  - CT abdomen pelvis revealing 5 mm obstructing calculus within the left proximal ureter causing mild to moderate left hydronephrosis.   - Urology consulted, status post cystoscopy, left ureteroscopy, laser lithotripsy, basket stone extraction, ureteral stent placement on 11/17.    -hx of enterococcus faecalis urine cx (3/4/2017)   -Continue Rocephin  -Pain control, lidocaine patch, Norco PRN, dilaudid PRN  -Follow-up urine cultures  -Plan for stent and  string removal in 3 days      Expected Discharge Location and Transportation: Home  Expected Discharge   Expected Discharge Date: 11/17/2024; Expected Discharge Time:      VTE Prophylaxis:  Mechanical VTE prophylaxis orders are present.         AM-PAC 6 Clicks Score (PT): 24 (11/17/24 0800)    CODE STATUS:   Code Status and Medical Interventions: CPR (Attempt to Resuscitate); Full Support   Ordered at: 11/16/24 3894     Code Status (Patient has no pulse and is not breathing):    CPR (Attempt to Resuscitate)     Medical Interventions (Patient has pulse or is breathing):    Full Support       Guille Gutiérrez DO  11/17/24

## 2024-11-17 NOTE — ANESTHESIA POSTPROCEDURE EVALUATION
Patient: Marta Amaya    Procedure Summary       Date: 11/17/24 Room / Location:  [facility] /  [facility]    Anesthesia Start: 0929 Anesthesia Stop: 1029    Procedure: CYSTOSCOPY URETEROSCOPY WITH LASER AND STENT PLACEMENT (Left: Ureter) Diagnosis:     Surgeons: Ton Harvey MD Provider: Racquel Gutierrez MD    Anesthesia Type: general ASA Status: 3            Anesthesia Type: general    Vitals  Vitals Value Taken Time   BP 89/57 11/17/24 1025   Temp 98.8    Pulse 87 11/17/24 1027   Resp 11    SpO2 91 % 11/17/24 1027   Vitals shown include unfiled device data.        Post Anesthesia Care and Evaluation    Patient location during evaluation: PACU  Patient participation: waiting for patient participation  Level of consciousness: sleepy but conscious  Pain score: 0  Pain management: adequate    Airway patency: patent  Anesthetic complications: No anesthetic complications  PONV Status: none  Cardiovascular status: hemodynamically stable and acceptable  Respiratory status: nonlabored ventilation, acceptable, nasal cannula and oral airway  Hydration status: hypovolemic (Infusing 200cc bolus and will reassess)

## 2024-11-17 NOTE — ED NOTES
Marta MCCOY [facility]    Nursing Report ED to Floor:  Mental status: GCS 15  Ambulatory status: independent at baseline; may require standby after pain meds  Oxygen Therapy:  RA  Cardiac Rhythm: regular rate  Admitted from: home  Safety Concerns:  none known  Social Issues: none known  ED Room #:  14    ED Nurse Phone Extension - [phone] or may call [phone].      HPI:   Chief Complaint   Patient presents with    Flank Pain       Past Medical History:  Past Medical History:   Diagnosis Date    Cellulitis     Kidney stones     Obesity     Skin lesion of breast     URI (upper respiratory infection)         Past Surgical History:  Past Surgical History:   Procedure Laterality Date    APPENDECTOMY      TONSILLECTOMY      TUBAL ABDOMINAL LIGATION          Admitting Doctor:   No admitting provider for patient encounter.    Consulting Provider(s):  Consults       No orders found from 10/18/2024 to 11/17/2024.             Admitting Diagnosis:   The primary encounter diagnosis was Left ureteral stone. A diagnosis of Upper respiratory tract infection due to severe acute respiratory syndrome coronavirus 2 (SARS-CoV-2) was also pertinent to this visit.    Most Recent Vitals:   Vitals:    11/16/24 1930 11/16/24 1933 11/16/24 1937 11/16/24 2039   BP: 134/83   143/74   BP Location:    Right arm   Patient Position:    Lying   Pulse:  87 94 88   Resp:    18   Temp:       TempSrc:       SpO2:  98% 96% 96%   Weight:       Height:           Active LDAs/IV Access:   Lines, Drains & Airways       Active LDAs       Name Placement date Placement time Site Days    Peripheral IV 11/16/24 2001 Left Antecubital 11/16/24 2001  Antecubital  less than 1                    Labs (abnormal labs have a star):   Labs Reviewed   URINALYSIS W/ MICROSCOPIC IF INDICATED (NO CULTURE) - Abnormal; Notable for the following components:       Result Value    Color, UA Orange (*)     Appearance, UA Slightly Cloudy (*)     Glucose,  mg/dL (1+) (*)     Ketones, UA  Trace (*)     Blood, UA Small (1+) (*)     Protein, UA >=300 mg/dL (3+) (*)     Leuk Esterase, UA Moderate (2+) (*)     Nitrite, UA Positive (*)     Urobilinogen, UA >=8.0 E.U./dL (*)     All other components within normal limits   CBC WITH AUTO DIFFERENTIAL - Abnormal; Notable for the following components:    WBC 10.97 (*)     Neutrophil % 76.1 (*)     Lymphocyte % 16.0 (*)     Neutrophils, Absolute 8.35 (*)     All other components within normal limits   URINALYSIS, MICROSCOPIC ONLY - Abnormal; Notable for the following components:    WBC, UA Too Numerous to Count (*)     Bacteria, UA 1+ (*)     Squamous Epithelial Cells, UA 3-6 (*)     All other components within normal limits   LIPASE - Normal   LACTIC ACID, PLASMA - Normal   BLOOD CULTURE   BLOOD CULTURE   URINE CULTURE   RAINBOW DRAW    Narrative:     The following orders were created for panel order [place] Draw.  Procedure                               Abnormality         Status                     ---------                               -----------         ------                     Green Top (Gel)[[id number]]                                  Final result               Lavender Top[[id number]]                                     Final result               Gold Top - SST[[id number]]                                   Final result               Carr Top[[id number]]                                         Final result               Light Blue Top[[id number]]                                   Final result                 Please view results for these tests on the individual orders.   COMPREHENSIVE METABOLIC PANEL    Narrative:     GFR Normal >60  Chronic Kidney Disease <60  Kidney Failure <15     HCG, QUANTITATIVE, PREGNANCY    Narrative:     HCG Ranges by Gestational Age    Females - non-pregnant premenopausal   </= 1mIU/mL HCG  Females - postmenopausal               </= 7mIU/mL HCG    3 Weeks         5.8 -    71.2 mIU/mL  4 Weeks         9.5 -     750 mIU/mL  5  Weeks         217 -   7,138 mIU/mL  6 Weeks         158 -  31,795 mIU/mL  7 Weeks       3,697 - 163,563 mIU/mL  8 Weeks      32,065 - 149,571 mIU/mL  9 Weeks      63,803 - 151,410 mIU/mL  10 Weeks     46,509 - 186,977 mIU/mL  12 Weeks     27,832 - 210,612 mIU/mL  14 Weeks     13,950 -  62,530 mIU/mL  15 Weeks     12,039 -  70,971 mIU/mL  16 Weeks      9,040 -  56,451 mIU/mL  17 Weeks      8,175 -  55,868 mIU/mL  18 Weeks      8,099 -  58,176 mIU/mL  Results may be falsely decreased if patient taking Biotin.     PROCALCITONIN   CBC AND DIFFERENTIAL    Narrative:     The following orders were created for panel order CBC & Differential.  Procedure                               Abnormality         Status                     ---------                               -----------         ------                     CBC Auto Differential[[id number]]        Abnormal            Final result                 Please view results for these tests on the individual orders.   GREEN TOP   LAVENDER TOP   GOLD TOP - SST   GRAY TOP   LIGHT BLUE TOP       Meds Given in ED:   Medications   Sodium Chloride (PF) 0.9 % 10 mL (has no administration in time range)   cefTRIAXone (ROCEPHIN) 2,000 mg in sodium chloride 0.9 % 100 mL MBP (has no administration in time range)   ketorolac (TORADOL) injection 15 mg (15 mg Intravenous Given 11/16/24 1907)   sodium chloride 0.9 % bolus 1,000 mL (1,000 mL Intravenous New Bag 11/16/24 1929)   HYDROmorphone (DILAUDID) injection 1 mg (1 mg Intravenous Given 11/16/24 1929)   iopamidol (ISOVUE-300) 61 % injection 100 mL (100 mL Intravenous Given 11/16/24 2008)           Last NIH score:                                                          Dysphagia screening results:        Graciela Coma Scale:  No data recorded     CIWA:        Restraint Type:            Isolation Status:  No active isolations

## 2024-11-17 NOTE — ANESTHESIA PREPROCEDURE EVALUATION
Anesthesia Evaluation     Patient summary reviewed and Nursing notes reviewed   no history of anesthetic complications:   NPO Solid Status: > 8 hours  NPO Liquid Status: > 8 hours           Airway   Mallampati: II  TM distance: >3 FB  Neck ROM: full  No difficulty expected  Dental - normal exam     Pulmonary - normal exam   (+) a smoker Current,  Cardiovascular - negative cardio ROS and normal exam        Neuro/Psych- negative ROS  (-) seizures, CVA, no Parkinson's disease  GI/Hepatic/Renal/Endo    (+) obesity, morbid obesity, GERD well controlled, renal disease- stones  (-) liver disease, diabetes, no thyroid disorder    Musculoskeletal     Abdominal    Substance History      OB/GYN          Other                    Anesthesia Plan    ASA 3     general     intravenous induction     Anesthetic plan, risks, benefits, and alternatives have been provided, discussed and informed consent has been obtained with: patient.    Plan discussed with CRNA.    CODE STATUS:    Code Status (Patient has no pulse and is not breathing): CPR (Attempt to Resuscitate)  Medical Interventions (Patient has pulse or is breathing): Full Support

## 2024-11-17 NOTE — H&P
[facility] Medicine Services  HISTORY AND PHYSICAL    Patient Name: Marta Amaya  : 1985  MRN: [id number]  Primary Care Physician: Paolo Bhandari MD  Date of admission: 2024    Subjective   Subjective     Chief Complaint:  L flank pain    HPI:  Marta Amaya is a 39 y.o. female with PMHx of kidney stones, pyelonephritis with R ureteral stent at [facility] 5/10/2024 (subsequently removed ) who presented to [facility] ED for evaluation of L flank pain / LUQ pain that started acutely ~ 6am today. She had been on amoxicillin for a dental procedure and has been taking amoxicillin, she took that this morning as well as ibuprofen and azo to see if this would help with her pain. States this pain is the same as prior kidney stones. She's had low grade fever, 99 and mild chills. No nausea, vomiting or diarrhea. No blood in her urine. She has been eating and drinking. Admission has been requested for further evaluation at [facility].    Review of Systems   Constitutional:  Positive for chills and fever. Negative for appetite change.   Gastrointestinal:  Positive for abdominal pain. Negative for abdominal distention, diarrhea, nausea and vomiting.   Genitourinary:  Positive for dysuria and flank pain. Negative for hematuria.   All other systems reviewed and are negative.         Personal History     Past Medical History:   Diagnosis Date    Cellulitis     Kidney stones     Obesity     Skin lesion of breast     URI (upper respiratory infection)              Past Surgical History:   Procedure Laterality Date    APPENDECTOMY      TONSILLECTOMY      TUBAL ABDOMINAL LIGATION         Family History:   family history includes Diabetes in an other family member; Heart disease in an other family member; Hypertension in her father and another family member; No Known Problems in her mother.     Social History:  reports that she has been smoking cigarettes. She started smoking about 8 years ago. She has  never used smokeless tobacco. She reports that she does not drink alcohol and does not use drugs.  Social History     Social History Narrative    Not on file       Medications:  acetaminophen, amoxicillin, ibuprofen, and phenazopyridine    No Known Allergies    Objective   Objective     Vital Signs:   Temp:  [97.8 °F (36.6 °C)-99.6 °F (37.6 °C)] 97.8 °F (36.6 °C)  Heart Rate:  [] 82  Resp:  [16-22] 16  BP: (134-159)/(74-98) 159/94    Physical Exam  Constitutional:       General: She is not in acute distress.     Appearance: She is well-developed. She is ill-appearing. She is not toxic-appearing.   HENT:      Head: Normocephalic and atraumatic.      Nose: Nose normal.      Mouth/Throat:      Pharynx: Oropharynx is clear.   Eyes:      Extraocular Movements: Extraocular movements intact.      Conjunctiva/sclera: Conjunctivae normal.      Pupils: Pupils are equal, round, and reactive to light.   Cardiovascular:      Rate and Rhythm: Normal rate and regular rhythm.      Pulses: Normal pulses.      Heart sounds: Normal heart sounds. No murmur heard.  Pulmonary:      Effort: Pulmonary effort is normal.      Breath sounds: Normal breath sounds.   Abdominal:      General: Bowel sounds are normal. There is no distension.      Palpations: Abdomen is soft.      Tenderness: There is no abdominal tenderness.   Musculoskeletal:         General: No swelling. Normal range of motion.      Cervical back: Normal range of motion and neck supple.   Skin:     General: Skin is warm and dry.      Capillary Refill: Capillary refill takes less than 2 seconds.      Findings: No rash.   Neurological:      Mental Status: She is alert and oriented to person, place, and time.      Cranial Nerves: No cranial nerve deficit.   Psychiatric:         Mood and Affect: Mood normal.         Behavior: Behavior normal.             Result Review:  I have personally reviewed the results from the time of this admission to 11/16/2024 23:15 EST and agree  with these findings:  [x]  Laboratory list / accordion  []  Microbiology  [x]  Radiology  []  EKG/Telemetry   []  Cardiology/Vascular   []  Pathology  []  Old records  []  Other:  Most notable findings include:      LAB RESULTS:      Lab 11/16/24 1855   WBC 10.97*   HEMOGLOBIN 13.5   HEMATOCRIT 40.5   PLATELETS 243   NEUTROS ABS 8.35*   IMMATURE GRANS (ABS) 0.01   LYMPHS ABS 1.76   MONOS ABS 0.67   EOS ABS 0.16   MCV 89.4   PROCALCITONIN 0.04   LACTATE 1.1         Lab 11/16/24 1855   SODIUM 138   POTASSIUM 3.8   CHLORIDE 104   CO2 24.7   ANION GAP 9.3   BUN 11   CREATININE 0.69   EGFR 114.1   GLUCOSE 98   CALCIUM 9.0         Lab 11/16/24 1855   TOTAL PROTEIN 7.0   ALBUMIN 4.1   GLOBULIN 2.9   ALT (SGPT) 21   AST (SGOT) 23   BILIRUBIN 0.4   ALK PHOS 94   LIPASE 25                     Brief Urine Lab Results  (Last result in the past 365 days)        Color   Clarity   Blood   Leuk Est   Nitrite   Protein   CREAT   Urine HCG        11/16/24 1855 Orange  Comment: Color of urine may affect dipstick results.    Slightly Cloudy   Small (1+)   Moderate (2+)   Positive   >=300 mg/dL (3+)                 Microbiology Results (last 10 days)       ** No results found for the last 240 hours. **            CT Abdomen Pelvis With Contrast    Result Date: 11/16/2024  CT ABDOMEN PELVIS W CONTRAST Date of Exam: 11/16/2024 7:38 PM EST Indication: Left flank pain. Comparison: CT of the abdomen and pelvis dated 11/14/2020 Technique: Axial CT images were obtained of the abdomen and pelvis following the uneventful intravenous administration of 90 mL Isovue 300. Reconstructed coronal and sagittal images were also obtained. Automated exposure control and iterative construction methods were used. Findings: Liver: The liver is unremarkable in morphology and decreased in attenuation. No focal liver lesion is seen. No biliary dilation is seen. Gallbladder: Unremarkable. Pancreas: Unremarkable. Spleen: Unremarkable. Adrenal glands:  Unremarkable. Genitourinary tract: 5 mm obstructing calculus within the left proximal ureter causes mild to moderate left hydronephrosis. There are multiple punctate nonobstructing calculi within both kidneys, ranging in size from 1 to 3 mm. No hydronephrosis on the right. The visualized portion of the right ureter is unremarkable. Urinary bladder and pelvic organs demonstrate no acute abnormality. Gastrointestinal tract: The visualized hollow viscera demonstrate no focal lesion. There is no evidence of bowel obstruction. Appendix: The appendix is not identified. Other findings: No free air or free fluid is identified. No pathologically enlarged lymph nodes are seen. The abdominal aorta and IVC appear unremarkable. Bones and soft tissues: No acute or suspicious osseous or soft tissue lesion is identified. Lung bases: The visualized lung bases are clear.     Impression: Impression: 1.5 mm obstructing calculus within the left proximal ureter causes mild to moderate left hydronephrosis. 2.Additional bilateral punctate nonobstructive nephrolithiasis. 3.Hepatic steatosis. 4.Additional findings as detailed above. Electronically Signed: Maldonado Decker MD  11/16/2024 8:16 PM EST  Workstation ID: [id number]         Assessment & Plan   Assessment & Plan       Renal stone    Acute flank pain    Hydronephrosis, left    Left obstructing kidney stone  Mild to moderate left hydronephrosis  - CT a/p w/ 5 mm obstructing calculus in the left proximal ureter w/ mild to moderate left hydronephrosis / additional bilateral nonobstructive nephrolithiasis  - continue rocephin, s/p 2 grams IV at OSH  - hx of enterococcus faecalis urine cx (3/4/2017)  - culture obtained at OSH, follow results  - NPO after MN  - pain control PRN  - nausea control PRN  - LR @ 100 ml/hr x 10 hrs  - urology consult in am        DVT prophylaxis:  SCDS    CODE STATUS:     Code Status (Patient has no pulse and is not breathing): CPR (Attempt to Resuscitate)  Medical  Interventions (Patient has pulse or is breathing): Full Support      Expected Discharge    Expected Discharge Date: 11/17/2024; Expected Discharge Time:       Signature: Electronically signed by ALETHEA Valiente, 11/16/24, 11:15 PM EST    Total time spent: 45 minutes  Time spent includes time reviewing chart, face-to-face time, counseling patient/family/caregiver, ordering medications/tests/procedures, communicating with other health care professionals, documenting clinical information in the electronic health record, and coordination of care.

## 2024-11-17 NOTE — PLAN OF CARE
Pt came to the floor with moderate-severe bilateral flank pain. Pt reports Pain being  uncontrolled with Q2 dilaudid but shared their hesitancy to receive Norco. Pain management teaching conducted and pt is managing pain with a combination of dilaudid and Norco (See MAR for details) and reports combination to be effective.   Goal: Optimal Pain Control and Function  Intervention: Prevent or Manage Pain  Description: Evaluate pain level, effect of treatment and patient response at regular intervals.  Minimize painful stimuli; coordinate care and adjust environment (e.g., light, noise, unnecessary movement); promote sleep/rest.  Match pharmacologic analgesia to severity and type of pain mechanism (e.g., neuropathic, muscle, inflammatory); consider multimodal approach.  Provide medication at regular intervals; titrate to patient response; premedicate for painful procedures.  Manage breakthrough pain with additional doses; consider rotation or switching medication.  Monitor for signs of substance tolerance (increased dose to reach desired effect, decreased effect with same dose).  Manage medication-induced adverse events and side effects.  Provide multimodal interventions, such as physical activity, therapeutic exercise, yoga, TENS (transcutaneous electrical nerve stimulation) and manual therapy.  Train in functional activity modifications, such as body mechanics, posture, ergonomics, energy conservation and activity pacing.  Consider addition of complementary or alternative therapy, such as acupuncture, hypnosis or therapeutic touch.  Recent Flowsheet Documentation  Taken 11/17/2024 0400 by Ramesh Flores RN  Medication Review/Management: medications reviewed  Taken 11/17/2024 0200 by Ramesh Flores, NISHANT  Medication Review/Management: medications reviewed  Taken 11/17/2024 0000 by Ramesh Flores RN  Sleep/Rest Enhancement:   relaxation techniques promoted   room darkened  Medication Review/Management: medications  reviewed  Taken 11/16/2024 2200 by Ramesh Flores, RN  Medication Review/Management: medications reviewed   Goal Outcome Evaluation:

## 2024-11-17 NOTE — OP NOTE
Operative Note    Name: Marta Amaya   Age:39 y.o.   Sex: female  MRN: [id number]    DATE OF PROCEDURE: 11/17/2024    PREOPERATIVE DIAGNOSIS:   Left ureteral stone    POSTOPERATIVE DIAGNOSIS: Left ureteral stone    PROCEDURE PERFORMED:  1.  Cystoscopy, left ureteroscopy, laser lithotripsy, basket stone extraction, ureteral stent placement  2.  Fluoroscopy, supervision and interpretation    SURGEON: Ton Harvey MD    ASSISTANT: None    ANESTHESIA: General    SPECIMEN: Left ureteral stone fragments for analysis    FINDINGS: Bladder and urethra normal.  Left ureteroscopy revealed the stone in the proximal ureter which pushed back into the kidney.  The stone was fragmented and extracted.  There was proper placement of the left stent.    INDICATIONS FOR PROCEDURE:  39-year-old white female with a history of UTIs, pyelonephritis, and urolithiasis.  She was admitted at [facility] in 2020 for pyelonephritis.  She underwent stone extraction 5/2024 at .     She was admitted for obstructing left ureteral stone and possible UTI.  A CT scan with contrast 11/16/2024 at [facility] showed a 5 mm left proximal ureteral stone, bilateral tiny stones, and left lower pole cyst.  Labs included creatinine 0.69, WBC 10.97, lactate 1.1, procalcitonin 0.04, and urinalysis nitrite positive, WBC TNTC, RBC 0-2, bacteria 1+.  She started Rocephin and tamsulosin.  She is here for left ureteroscopic laser lithotripsy.    DRAINS: Left ureteral stent 6 Danish by 24 cm, strings intact    DESCRIPTION OF PROCEDURE: After informed consent, the patient was taken to the operating room in stable condition.  Anesthesia was induced without complications.  She was placed in a lithotomy position.  The genitalia and groins were prepped and draped.  A timeout was taken to identify the correct patient and procedures and side.  A 22 Danish cystoscope was placed per urethra into the bladder.  Inspection revealed a normal bladder.  A 0.035 hydrophilic guidewire was  inserted into the left ureter and advanced up to the level of the kidney under fluoroscopy guidance without difficulty.  The cystoscope was removed.  Left ureteroscopy was performed with a semirigid ureteroscope.  This was advanced up to the stone in the proximal ureter without difficulty.  A 200 µm thulium laser was used to fragment to the stone which subsequently pushed back into the kidney.  Another 0.035 hydrophilic guidewire was placed.  The ureteroscope was withdrawn.  A 36 cm x 11/13 [place] ureteral access sheath was placed over a wire and into the proximal ureter.  The digital flexible ureteroscope was placed.  Pyeloscopy was performed.  The stone was in the renal pelvis.  The stone was further fragmented.  A 1.9 [place] 0 tip basket was used to extract the fragments.  All calyces were inspected.  No other sizable stone fragments remained.  The ureteroscope and access sheath were removed simultaneously.  The cystoscope was placed over the wire into the bladder.  A 6 [place] by 24 cm double-J ureteral stent was passed over the wire and up to the level of the kidney under fluoroscopy guidance without difficulty.  The wire was removed and there was adequate curl in the kidney and in the bladder.  The strings were left intact.  The bladder was drained.  The scope was removed.  The strings were tied and cut shorter and tucked into the vagina.  Lidocaine jelly was placed per urethra.  She was awakened from anesthesia and taken to the recovery room in satisfactory condition.    Estimated blood loss: None    Complications: None    DISPOSITION: She will go to the PACU and returned to the floor for routine postoperative care.  The stent and strings may be removed in about 3 days.    Ton Harvey MD  -  11/17/2024, 10:26 EST

## 2024-11-17 NOTE — PLAN OF CARE
Problem: Adult Inpatient Plan of Care  Goal: Plan of Care Review  Outcome: Progressing  Goal: Patient-Specific Goal (Individualized)  Outcome: Progressing  Goal: Absence of Hospital-Acquired Illness or Injury  Outcome: Progressing  Intervention: Identify and Manage Fall Risk  Recent Flowsheet Documentation  Taken 11/17/2024 1600 by Mary Leonard RN  Safety Promotion/Fall Prevention:   activity supervised   assistive device/personal items within reach   clutter free environment maintained   fall prevention program maintained   nonskid shoes/slippers when out of bed   safety round/check completed  Taken 11/17/2024 1400 by Mary Leonard RN  Safety Promotion/Fall Prevention:   activity supervised   assistive device/personal items within reach   clutter free environment maintained   fall prevention program maintained   nonskid shoes/slippers when out of bed   safety round/check completed  Taken 11/17/2024 1200 by Mary Leonard RN  Safety Promotion/Fall Prevention:   activity supervised   assistive device/personal items within reach   clutter free environment maintained   fall prevention program maintained   nonskid shoes/slippers when out of bed   safety round/check completed  Taken 11/17/2024 1000 by Mary Leonard RN  Safety Promotion/Fall Prevention: patient off unit  Taken 11/17/2024 0800 by Mary Leonard RN  Safety Promotion/Fall Prevention:   activity supervised   assistive device/personal items within reach   clutter free environment maintained   fall prevention program maintained   nonskid shoes/slippers when out of bed   safety round/check completed  Intervention: Prevent Skin Injury  Recent Flowsheet Documentation  Taken 11/17/2024 1600 by Mary Leonard RN  Body Position: position changed independently  Taken 11/17/2024 1400 by Mary Leonard RN  Body Position: position changed independently  Taken 11/17/2024 1200 by Mary Leonard RN  Body Position: position changed independently  Taken 11/17/2024 0800 by  Mary Leonard RN  Body Position: position changed independently  Skin Protection: incontinence pads utilized  Intervention: Prevent Infection  Recent Flowsheet Documentation  Taken 11/17/2024 1600 by Mary Leonard RN  Infection Prevention:   environmental surveillance performed   equipment surfaces disinfected   hand hygiene promoted   personal protective equipment utilized   single patient room provided   rest/sleep promoted   visitors restricted/screened  Taken 11/17/2024 1400 by Mary Leonard RN  Infection Prevention:   environmental surveillance performed   equipment surfaces disinfected   hand hygiene promoted   personal protective equipment utilized   rest/sleep promoted   single patient room provided   visitors restricted/screened  Taken 11/17/2024 1200 by Mary Leonard RN  Infection Prevention:   environmental surveillance performed   equipment surfaces disinfected   hand hygiene promoted   personal protective equipment utilized   rest/sleep promoted   single patient room provided   visitors restricted/screened  Taken 11/17/2024 0800 by Mary Leonard RN  Infection Prevention:   environmental surveillance performed   equipment surfaces disinfected   hand hygiene promoted   personal protective equipment utilized   rest/sleep promoted   single patient room provided   visitors restricted/screened  Goal: Optimal Comfort and Wellbeing  Outcome: Progressing  Intervention: Provide Person-Centered Care  Recent Flowsheet Documentation  Taken 11/17/2024 0800 by Mary Leonard RN  Trust Relationship/Rapport:   care explained   emotional support provided   choices provided   questions answered   empathic listening provided   questions encouraged   reassurance provided   thoughts/feelings acknowledged  Goal: Readiness for Transition of Care  Outcome: Progressing     Problem: Pain Acute  Goal: Optimal Pain Control and Function  Outcome: Progressing  Intervention: Prevent or Manage Pain  Recent Flowsheet Documentation  Taken  11/17/2024 1600 by Mary Leonard RN  Medication Review/Management: medications reviewed  Taken 11/17/2024 1400 by Mary Leonard RN  Medication Review/Management: medications reviewed  Taken 11/17/2024 1200 by Mary Leonard RN  Medication Review/Management: medications reviewed  Taken 11/17/2024 0800 by Mary Leonard RN  Medication Review/Management: medications reviewed     Problem: UTI (Urinary Tract Infection)  Goal: Improved Infection Symptoms  Outcome: Progressing   Goal Outcome Evaluation:  Urology consulted. Cystoscopy performed today. L ureter stent placed. Urine dark orange. Patient still c/o left lower back and flank pain. Norco and Dilaudid given. Scheduled Tylenol added. New IV placed in R UA. VSS. RA. NSR. Family at bedside.

## 2024-11-17 NOTE — FSED PROVIDER NOTE
Subjective  History of Present Illness:    This is a 39 year old female no significant past medical history other than kidney stones and urinary tract infections who presents with having left flank pain with radiation to the left lower quadrant that started at 6:00 this morning.  Patient has a history of kidney stones and pyelonephritis.  Last saw [facility] in May for ureteral stenting and pyelonephritis.  Before that she had been hospitalized for pyelonephritis several years ago and had been hospitalized at [facility].  Patient has had chills and bodyaches with no fever.  She did take AZO.  She denies any nausea or vomiting.  No hematuria.      Nurses Notes reviewed and agree, including vitals, allergies, social history and prior medical history.     REVIEW OF SYSTEMS: All systems reviewed and not pertinent unless noted.  Review of Systems    Past Medical History:   Diagnosis Date    Cellulitis     Kidney stones     Obesity     Skin lesion of breast     URI (upper respiratory infection)        Allergies:    Patient has no known allergies.      Past Surgical History:   Procedure Laterality Date    APPENDECTOMY      TONSILLECTOMY      TUBAL ABDOMINAL LIGATION           Social History     Socioeconomic History    Marital status:    Tobacco Use    Smoking status: Every Day     Current packs/day: 0.00     Types: Cigarettes     Start date:      Last attempt to quit: 2016     Years since quittin.8    Smokeless tobacco: Never   Substance and Sexual Activity    Alcohol use: No    Drug use: No    Sexual activity: Yes     Partners: Male     Birth control/protection: Surgical     Comment:          Family History   Problem Relation Age of Onset    No Known Problems Mother     Hypertension Father     Diabetes Other     Heart disease Other     Hypertension Other        Objective  Physical Exam:  /74 (BP Location: Right arm, Patient Position: Lying)   Pulse 88   Temp 99.6 °F (37.6  "°C) (Oral)   Resp 18   Ht 162.6 cm (64\")   Wt 109 kg (240 lb)   SpO2 96%   BMI 41.20 kg/m²      Physical Exam  Vitals and nursing note reviewed.   Constitutional:       Appearance: Normal appearance.   HENT:      Mouth/Throat:      Mouth: Mucous membranes are moist.   Eyes:      Pupils: Pupils are equal, round, and reactive to light.   Cardiovascular:      Rate and Rhythm: Regular rhythm. Tachycardia present.      Pulses: Normal pulses.      Heart sounds: Normal heart sounds.   Pulmonary:      Effort: Pulmonary effort is normal.      Breath sounds: Normal breath sounds.   Abdominal:      General: Abdomen is flat. There is no distension.      Palpations: Abdomen is soft.      Comments: Left CVA tenderness   Musculoskeletal:         General: Normal range of motion.   Skin:     General: Skin is warm and dry.   Neurological:      General: No focal deficit present.      Mental Status: She is alert and oriented to person, place, and time.         Procedures    ED Course:         Lab Results (last 24 hours)       Procedure Component Value Units Date/Time    CBC & Differential [[id number]]  (Abnormal) Collected: 11/16/24 1855    Specimen: Blood Updated: 11/16/24 1905    Narrative:      The following orders were created for panel order CBC & Differential.  Procedure                               Abnormality         Status                     ---------                               -----------         ------                     CBC Auto Differential[[id number]]        Abnormal            Final result                 Please view results for these tests on the individual orders.    Comprehensive Metabolic Panel [[id number]] Collected: 11/16/24 1855    Specimen: Blood Updated: 11/16/24 1923     Glucose 98 mg/dL      BUN 11 mg/dL      Creatinine 0.69 mg/dL      Sodium 138 mmol/L      Potassium 3.8 mmol/L      Chloride 104 mmol/L      CO2 24.7 mmol/L      Calcium 9.0 mg/dL      Total Protein 7.0 g/dL      Albumin 4.1 g/dL      " ALT (SGPT) 21 U/L      AST (SGOT) 23 U/L      Alkaline Phosphatase 94 U/L      Total Bilirubin 0.4 mg/dL      Globulin 2.9 gm/dL      A/G Ratio 1.4 g/dL      BUN/Creatinine Ratio 15.9     Anion Gap 9.3 mmol/L      eGFR 114.1 mL/min/1.73     Narrative:      GFR Normal >60  Chronic Kidney Disease <60  Kidney Failure <15      Lipase [[id number]]  (Normal) Collected: 11/16/24 1855    Specimen: Blood Updated: 11/16/24 1923     Lipase 25 U/L     Urinalysis With Microscopic If Indicated (No Culture) - Urine, Clean Catch [[id number]]  (Abnormal) Collected: 11/16/24 1855    Specimen: Urine, Clean Catch Updated: 11/16/24 1909     Color, UA Orange     Comment: Color of urine may affect dipstick results.         Appearance, UA Slightly Cloudy     pH, UA 5.5     Specific Gravity, UA 1.015     Glucose,  mg/dL (1+)     Ketones, UA Trace     Bilirubin, UA Negative     Blood, UA Small (1+)     Protein, UA >=300 mg/dL (3+)     Leuk Esterase, UA Moderate (2+)     Nitrite, UA Positive     Urobilinogen, UA >=8.0 E.U./dL    hCG, Quantitative, Pregnancy [[id number]] Collected: 11/16/24 1855    Specimen: Blood Updated: 11/16/24 1931     HCG Quantitative <0.20 mIU/mL     Narrative:      HCG Ranges by Gestational Age    Females - non-pregnant premenopausal   </= 1mIU/mL HCG  Females - postmenopausal               </= 7mIU/mL HCG    3 Weeks         5.8 -    71.2 mIU/mL  4 Weeks         9.5 -     750 mIU/mL  5 Weeks         217 -   7,138 mIU/mL  6 Weeks         158 -  31,795 mIU/mL  7 Weeks       3,697 - 163,563 mIU/mL  8 Weeks      32,065 - 149,571 mIU/mL  9 Weeks      63,803 - 151,410 mIU/mL  10 Weeks     46,509 - 186,977 mIU/mL  12 Weeks     27,832 - 210,612 mIU/mL  14 Weeks     13,950 -  62,530 mIU/mL  15 Weeks     12,039 -  70,971 mIU/mL  16 Weeks      9,040 -  56,451 mIU/mL  17 Weeks      8,175 -  55,868 mIU/mL  18 Weeks      8,099 -  58,176 mIU/mL  Results may be falsely decreased if patient taking Biotin.      CBC Auto Differential  [[id number]]  (Abnormal) Collected: 11/16/24 1855    Specimen: Blood Updated: 11/16/24 1905     WBC 10.97 10*3/mm3      RBC 4.53 10*6/mm3      Hemoglobin 13.5 g/dL      Hematocrit 40.5 %      MCV 89.4 fL      MCH 29.8 pg      MCHC 33.3 g/dL      RDW 12.9 %      RDW-SD 42.5 fl      MPV 10.4 fL      Platelets 243 10*3/mm3      Neutrophil % 76.1 %      Lymphocyte % 16.0 %      Monocyte % 6.1 %      Eosinophil % 1.5 %      Basophil % 0.2 %      Immature Grans % 0.1 %      Neutrophils, Absolute 8.35 10*3/mm3      Lymphocytes, Absolute 1.76 10*3/mm3      Monocytes, Absolute 0.67 10*3/mm3      Eosinophils, Absolute 0.16 10*3/mm3      Basophils, Absolute 0.02 10*3/mm3      Immature Grans, Absolute 0.01 10*3/mm3     Urinalysis, Microscopic Only - Urine, Clean Catch [[id number]]  (Abnormal) Collected: 11/16/24 1855    Specimen: Urine, Clean Catch Updated: 11/16/24 1923     RBC, UA 0-2 /HPF      WBC, UA Too Numerous to Count /HPF      Bacteria, UA 1+ /HPF      Squamous Epithelial Cells, UA 3-6 /HPF      Hyaline Casts, UA None Seen /LPF      WBC Clumps, UA Small/1+ /HPF      Methodology Manual Light Microscopy    Lactic Acid, Plasma [[id number]]  (Normal) Collected: 11/16/24 1855    Specimen: Blood Updated: 11/16/24 2046     Lactate 1.1 mmol/L     Procalcitonin [[id number]] Collected: 11/16/24 1855    Specimen: Blood Updated: 11/16/24 2033    Urine Culture - Urine, Urine, Clean Catch [[id number]] Collected: 11/16/24 1855    Specimen: Urine, Clean Catch Updated: 11/16/24 2032             CT Abdomen Pelvis With Contrast    Result Date: 11/16/2024  CT ABDOMEN PELVIS W CONTRAST Date of Exam: 11/16/2024 7:38 PM EST Indication: Left flank pain. Comparison: CT of the abdomen and pelvis dated 11/14/2020 Technique: Axial CT images were obtained of the abdomen and pelvis following the uneventful intravenous administration of 90 mL Isovue 300. Reconstructed coronal and sagittal images were also obtained. Automated exposure control and  iterative construction methods were used. Findings: Liver: The liver is unremarkable in morphology and decreased in attenuation. No focal liver lesion is seen. No biliary dilation is seen. Gallbladder: Unremarkable. Pancreas: Unremarkable. Spleen: Unremarkable. Adrenal glands: Unremarkable. Genitourinary tract: 5 mm obstructing calculus within the left proximal ureter causes mild to moderate left hydronephrosis. There are multiple punctate nonobstructing calculi within both kidneys, ranging in size from 1 to 3 mm. No hydronephrosis on the right. The visualized portion of the right ureter is unremarkable. Urinary bladder and pelvic organs demonstrate no acute abnormality. Gastrointestinal tract: The visualized hollow viscera demonstrate no focal lesion. There is no evidence of bowel obstruction. Appendix: The appendix is not identified. Other findings: No free air or free fluid is identified. No pathologically enlarged lymph nodes are seen. The abdominal aorta and IVC appear unremarkable. Bones and soft tissues: No acute or suspicious osseous or soft tissue lesion is identified. Lung bases: The visualized lung bases are clear.     Impression: Impression: 1.5 mm obstructing calculus within the left proximal ureter causes mild to moderate left hydronephrosis. 2.Additional bilateral punctate nonobstructive nephrolithiasis. 3.Hepatic steatosis. 4.Additional findings as detailed above. Electronically Signed: Maldonado Decker MD  11/16/2024 8:16 PM EST  Workstation ID: [id number]        MDM      Initial impression of presenting illness: Patient presents with complaints of left flank pain.  White blood cell count 10.  Patient was tachycardic at 124 on arrival, maximum 10.  Nine 9.6.  Patient given a liter of fluids and pain medication.  She was found to have a 5 mm proximal stone on the left with obstruction.  Also found to have a urinary tract infection with this.  Patient's pain was controlled with Dilaudid.  Patient  discussed with Dr. Harvey, urology who agreed to consult on the patient.  I did offer the patient transfer to the emergency [place] as she has been seen there most recently however she would like to stay with [facility].  I then spoke with Dr. Chucky Clarke, [facility] medicine who accepted the patient for admission.    DDX: includes but is not limited to: Urinary tract infection, ureterolithiasis, pyelonephritis, diverticulitis        Medications   Sodium Chloride (PF) 0.9 % 10 mL (has no administration in time range)   cefTRIAXone (ROCEPHIN) 2,000 mg in sodium chloride 0.9 % 100 mL MBP (has no administration in time range)   ketorolac (TORADOL) injection 15 mg (15 mg Intravenous Given 11/16/24 1907)   sodium chloride 0.9 % bolus 1,000 mL (1,000 mL Intravenous New Bag 11/16/24 1929)   HYDROmorphone (DILAUDID) injection 1 mg (1 mg Intravenous Given 11/16/24 1929)   iopamidol (ISOVUE-300) 61 % injection 100 mL (100 mL Intravenous Given 11/16/24 2008)       Data interpreted: Nursing notes reviewed, vital signs reviewed.  Labs independently interpreted by me (CBC, CMP, lipase, UA, troponin, ABG, lactic acid, procalcitonin).  Imaging independently interpreted by me (x-ray, CT scan).  EKG independently interpreted by me.  O2 saturation:     Counseling: Discussed the results above with the patient regarding need for admission or discharge.  Patient understands and agrees plan of care.      -----  ED Disposition       ED Disposition   Decision to Admit    Condition   --    Comment   Level of Care: Telemetry [5]   Accepting Provider:: JUAN DIEGO CORTES [[id number]]               Final diagnoses:   Left ureteral stone   Upper respiratory tract infection due to severe acute respiratory syndrome coronavirus 2 (SARS-CoV-2)     Your Follow-Up Providers    Follow-up information has not been specified.       Contact information for after-discharge care    Follow-up information has not been specified.          Your medication list         CONTINUE taking these medications        Instructions Last Dose Given Next Dose Due   acetaminophen 500 MG tablet  Commonly known as: TYLENOL      Take 1,000 mg by mouth Every 6 (Six) Hours As Needed for Mild Pain .       albuterol sulfate  (90 Base) MCG/ACT inhaler  Commonly known as: PROVENTIL HFA;VENTOLIN HFA;PROAIR HFA      Inhale 2 puffs Every 6 (Six) Hours As Needed for Wheezing.       benzonatate 100 MG capsule  Commonly known as: TESSALON      Take 200 mg by mouth 3 (Three) Times a Day As Needed for Cough.       cefdinir 300 MG capsule  Commonly known as: OMNICEF      Take 1 capsule by mouth 2 (Two) Times a Day.       ketorolac 10 MG tablet  Commonly known as: TORADOL      Take 1 tablet by mouth Every 6 (Six) Hours As Needed for Moderate Pain . Received a dose in the ER.       ondansetron ODT 8 MG disintegrating tablet  Commonly known as: ZOFRAN-ODT      Place 1 tablet on the tongue Every 8 (Eight) Hours As Needed for Nausea or Vomiting.       phenazopyridine 200 MG tablet  Commonly known as: PYRIDIUM      Take 1 tablet by mouth 3 (Three) Times a Day As Needed for Bladder Spasms.

## 2024-11-18 LAB
ANION GAP SERPL CALCULATED.3IONS-SCNC: 11 MMOL/L (ref 5–15)
BUN SERPL-MCNC: 12 MG/DL (ref 6–20)
BUN/CREAT SERPL: 16.7 (ref 7–25)
CALCIUM SPEC-SCNC: 8.1 MG/DL (ref 8.6–10.5)
CHLORIDE SERPL-SCNC: 105 MMOL/L (ref 98–107)
CO2 SERPL-SCNC: 23 MMOL/L (ref 22–29)
CREAT SERPL-MCNC: 0.72 MG/DL (ref 0.57–1)
EGFRCR SERPLBLD CKD-EPI 2021: 109.9 ML/MIN/1.73
GLUCOSE SERPL-MCNC: 144 MG/DL (ref 65–99)
POTASSIUM SERPL-SCNC: 4.1 MMOL/L (ref 3.5–5.2)
SODIUM SERPL-SCNC: 139 MMOL/L (ref 136–145)

## 2024-11-18 PROCEDURE — 99232 SBSQ HOSP IP/OBS MODERATE 35: CPT | Performed by: INTERNAL MEDICINE

## 2024-11-18 PROCEDURE — G0378 HOSPITAL OBSERVATION PER HR: HCPCS

## 2024-11-18 PROCEDURE — 80048 BASIC METABOLIC PNL TOTAL CA: CPT | Performed by: STUDENT IN AN ORGANIZED HEALTH CARE EDUCATION/TRAINING PROGRAM

## 2024-11-18 PROCEDURE — 25010000002 CEFTRIAXONE PER 250 MG: Performed by: UROLOGY

## 2024-11-18 PROCEDURE — 25010000002 HYDROMORPHONE PER 4 MG: Performed by: UROLOGY

## 2024-11-18 RX ORDER — DIAZEPAM 5 MG/1
5 TABLET ORAL EVERY 6 HOURS PRN
Status: DISCONTINUED | OUTPATIENT
Start: 2024-11-18 | End: 2024-11-19 | Stop reason: HOSPADM

## 2024-11-18 RX ORDER — HYDROCODONE BITARTRATE AND ACETAMINOPHEN 5; 325 MG/1; MG/1
1 TABLET ORAL EVERY 6 HOURS PRN
Qty: 12 TABLET | Refills: 0 | Status: CANCELLED | OUTPATIENT
Start: 2024-11-18

## 2024-11-18 RX ADMIN — HYDROMORPHONE HYDROCHLORIDE 0.5 MG: 1 INJECTION, SOLUTION INTRAMUSCULAR; INTRAVENOUS; SUBCUTANEOUS at 20:57

## 2024-11-18 RX ADMIN — HYDROMORPHONE HYDROCHLORIDE 0.5 MG: 1 INJECTION, SOLUTION INTRAMUSCULAR; INTRAVENOUS; SUBCUTANEOUS at 10:48

## 2024-11-18 RX ADMIN — HYDROMORPHONE HYDROCHLORIDE 0.5 MG: 1 INJECTION, SOLUTION INTRAMUSCULAR; INTRAVENOUS; SUBCUTANEOUS at 18:52

## 2024-11-18 RX ADMIN — HYDROCODONE BITARTRATE AND ACETAMINOPHEN 1 TABLET: 7.5; 325 TABLET ORAL at 22:48

## 2024-11-18 RX ADMIN — HYDROMORPHONE HYDROCHLORIDE 0.5 MG: 1 INJECTION, SOLUTION INTRAMUSCULAR; INTRAVENOUS; SUBCUTANEOUS at 16:14

## 2024-11-18 RX ADMIN — HYDROMORPHONE HYDROCHLORIDE 0.5 MG: 1 INJECTION, SOLUTION INTRAMUSCULAR; INTRAVENOUS; SUBCUTANEOUS at 04:45

## 2024-11-18 RX ADMIN — ACETAMINOPHEN 500 MG: 500 TABLET, FILM COATED ORAL at 16:14

## 2024-11-18 RX ADMIN — DIAZEPAM 5 MG: 5 TABLET ORAL at 14:13

## 2024-11-18 RX ADMIN — Medication 10 ML: at 20:36

## 2024-11-18 RX ADMIN — HYDROMORPHONE HYDROCHLORIDE 0.5 MG: 1 INJECTION, SOLUTION INTRAMUSCULAR; INTRAVENOUS; SUBCUTANEOUS at 08:02

## 2024-11-18 RX ADMIN — HYDROMORPHONE HYDROCHLORIDE 0.5 MG: 1 INJECTION, SOLUTION INTRAMUSCULAR; INTRAVENOUS; SUBCUTANEOUS at 14:10

## 2024-11-18 RX ADMIN — HYDROCODONE BITARTRATE AND ACETAMINOPHEN 1 TABLET: 7.5; 325 TABLET ORAL at 11:53

## 2024-11-18 RX ADMIN — HYDROMORPHONE HYDROCHLORIDE 0.5 MG: 1 INJECTION, SOLUTION INTRAMUSCULAR; INTRAVENOUS; SUBCUTANEOUS at 02:00

## 2024-11-18 RX ADMIN — ACETAMINOPHEN 500 MG: 500 TABLET, FILM COATED ORAL at 09:07

## 2024-11-18 RX ADMIN — ACETAMINOPHEN 500 MG: 500 TABLET, FILM COATED ORAL at 03:08

## 2024-11-18 RX ADMIN — SENNOSIDES AND DOCUSATE SODIUM 2 TABLET: 50; 8.6 TABLET ORAL at 20:34

## 2024-11-18 RX ADMIN — ACETAMINOPHEN 500 MG: 500 TABLET, FILM COATED ORAL at 20:34

## 2024-11-18 RX ADMIN — SENNOSIDES AND DOCUSATE SODIUM 2 TABLET: 50; 8.6 TABLET ORAL at 09:07

## 2024-11-18 RX ADMIN — Medication 10 ML: at 08:03

## 2024-11-18 RX ADMIN — FAMOTIDINE 40 MG: 20 TABLET, FILM COATED ORAL at 09:07

## 2024-11-18 RX ADMIN — Medication 5 MG: at 20:34

## 2024-11-18 RX ADMIN — DIAZEPAM 5 MG: 5 TABLET ORAL at 20:34

## 2024-11-18 RX ADMIN — TAMSULOSIN HYDROCHLORIDE 0.4 MG: 0.4 CAPSULE ORAL at 20:34

## 2024-11-18 RX ADMIN — SODIUM CHLORIDE 2000 MG: 900 INJECTION INTRAVENOUS at 20:34

## 2024-11-18 RX ADMIN — HYDROCODONE BITARTRATE AND ACETAMINOPHEN 1 TABLET: 7.5; 325 TABLET ORAL at 04:03

## 2024-11-18 NOTE — PROGRESS NOTES
[facility] Medicine Services  PROGRESS NOTE    Patient Name: Marta Amaya  : 1985  MRN: [id number]    Date of Admission: 2024  Primary Care Physician: Paolo Bhandari MD    Subjective   Subjective     CC:  Left flank pain left upper quadrant pain    HPI:  having L flank pain with spasms most of the day, got better w valium.  Cannot sleep.        Objective   Objective     Vital Signs:   Temp:  [97.9 °F (36.6 °C)-98.8 °F (37.1 °C)] 98.7 °F (37.1 °C)  Heart Rate:  [] 89  Resp:  [16-18] 18  BP: ()/(54-82) 130/76  Flow (L/min) (Oxygen Therapy):  [2-3] 2     Gen:  WD/WN  Neuro: alert and oriented, clear speech, follows commands, grossly nonfocal  HEENT:  NC/AT   Neck:  Supple, no LAD  Heart RRR no murmur, rub, or gallop  Abd:  Soft, nontender,   L flank is tender to light palp   Extrem:  No c/c/e        Results Reviewed:  LAB RESULTS:      Lab 24  0902 24  1855   WBC 7.02 10.97*   HEMOGLOBIN 11.2* 13.5   HEMATOCRIT 34.7 40.5   PLATELETS 190 243   NEUTROS ABS 4.04 8.35*   IMMATURE GRANS (ABS) 0.01 0.01   LYMPHS ABS 2.15 1.76   MONOS ABS 0.59 0.67   EOS ABS 0.22 0.16   MCV 93.5 89.4   PROCALCITONIN  --  0.04   LACTATE  --  1.1         Lab 24  0902 24  1855   SODIUM 136 138   POTASSIUM 3.8 3.8   CHLORIDE 105 104   CO2 22.0 24.7   ANION GAP 9.0 9.3   BUN 12 11   CREATININE 0.60 0.69   EGFR 118.0 114.1   GLUCOSE 114* 98   CALCIUM 7.8* 9.0   MAGNESIUM 2.1  --          Lab 24  1855   TOTAL PROTEIN 7.0   ALBUMIN 4.1   GLOBULIN 2.9   ALT (SGPT) 21   AST (SGOT) 23   BILIRUBIN 0.4   ALK PHOS 94   LIPASE 25                     Brief Urine Lab Results  (Last result in the past 365 days)        Color   Clarity   Blood   Leuk Est   Nitrite   Protein   CREAT   Urine HCG        24 7866 Orange  Comment: Color of urine may affect dipstick results.    Slightly Cloudy   Small (1+)   Moderate (2+)   Positive   >=300 mg/dL (3+)                    Microbiology Results Abnormal       None            FL C Arm During Surgery    Result Date: 11/17/2024  This procedure was auto-finalized with no dictation required.    XR Chest 1 View    Result Date: 11/16/2024  XR CHEST 1 VW Date of Exam: 11/16/2024 11:09 PM EST Indication: pre op / leukcytosis Comparison: 1/16/2020. Findings: There are no airspace consolidations. No pleural fluid. No pneumothorax. The pulmonary vasculature appears within normal limits. The cardiac and mediastinal silhouette appear unremarkable. No acute osseous abnormality identified.     Impression: Impression: No acute cardiopulmonary process. Electronically Signed: Angelita Garner MD  11/16/2024 11:40 PM EST  Workstation ID: [id number]    CT Abdomen Pelvis With Contrast    Result Date: 11/16/2024  CT ABDOMEN PELVIS W CONTRAST Date of Exam: 11/16/2024 7:38 PM EST Indication: Left flank pain. Comparison: CT of the abdomen and pelvis dated 11/14/2020 Technique: Axial CT images were obtained of the abdomen and pelvis following the uneventful intravenous administration of 90 mL Isovue 300. Reconstructed coronal and sagittal images were also obtained. Automated exposure control and iterative construction methods were used. Findings: Liver: The liver is unremarkable in morphology and decreased in attenuation. No focal liver lesion is seen. No biliary dilation is seen. Gallbladder: Unremarkable. Pancreas: Unremarkable. Spleen: Unremarkable. Adrenal glands: Unremarkable. Genitourinary tract: 5 mm obstructing calculus within the left proximal ureter causes mild to moderate left hydronephrosis. There are multiple punctate nonobstructing calculi within both kidneys, ranging in size from 1 to 3 mm. No hydronephrosis on the right. The visualized portion of the right ureter is unremarkable. Urinary bladder and pelvic organs demonstrate no acute abnormality. Gastrointestinal tract: The visualized hollow viscera demonstrate no focal lesion. There is no  evidence of bowel obstruction. Appendix: The appendix is not identified. Other findings: No free air or free fluid is identified. No pathologically enlarged lymph nodes are seen. The abdominal aorta and IVC appear unremarkable. Bones and soft tissues: No acute or suspicious osseous or soft tissue lesion is identified. Lung bases: The visualized lung bases are clear.     Impression: Impression: 1.5 mm obstructing calculus within the left proximal ureter causes mild to moderate left hydronephrosis. 2.Additional bilateral punctate nonobstructive nephrolithiasis. 3.Hepatic steatosis. 4.Additional findings as detailed above. Electronically Signed: Maldonado Decker MD  11/16/2024 8:16 PM EST  Workstation ID: [id number]         Current medications:  Scheduled Meds:acetaminophen, 500 mg, Oral, Q6H  cefTRIAXone, 2,000 mg, Intravenous, Q24H  famotidine, 40 mg, Oral, Daily  Lidocaine, 1 patch, Transdermal, Q24H  senna-docusate sodium, 2 tablet, Oral, BID  sodium chloride, 10 mL, Intravenous, Q12H  tamsulosin, 0.4 mg, Oral, Nightly      Continuous Infusions:lactated ringers, 9 mL/hr, Last Rate: 9 mL/hr (11/17/24 1138)      PRN Meds:.  senna-docusate sodium **AND** polyethylene glycol **AND** bisacodyl **AND** bisacodyl    Calcium Replacement - Follow Nurse / BPA Driven Protocol    HYDROcodone-acetaminophen    HYDROmorphone **AND** naloxone    Magnesium Standard Dose Replacement - Follow Nurse / BPA Driven Protocol    melatonin    nitroglycerin    ondansetron ODT **OR** ondansetron    Phosphorus Replacement - Follow Nurse / BPA Driven Protocol    Potassium Replacement - Follow Nurse / BPA Driven Protocol    Sodium Chloride (PF)    sodium chloride    sodium chloride    Assessment & Plan   Assessment & Plan     Active Hospital Problems    Diagnosis  POA    **Renal stone [N20.0]  Yes    Hydronephrosis, left [N13.30]  Yes    Acute flank pain [R10.9]  Yes      Resolved Hospital Problems   No resolved problems to display.        Brief  Hospital Course to date:  Marta Amaya is a 39 y.o. female with past medical history of kidney stones, pyelonephritis status post right ureteral stent at [facility] on 5/10/2024 with removal on 5/20/2024 presented to [facility] ED with left flank pain and left upper quadrant pain.  CT abdomen pelvis revealing 5 mm obstructing calculus within the left proximal ureter causing mild to moderate left hydronephrosis.  Urology consulted, status post cystoscopy, left ureteroscopy, laser lithotripsy, basket stone extraction, ureteral stent placement on 11/17.  UA consistent with infection.  Currently on IV antibiotics and adjusting pain control.    5 mm obstructing calculus within the left proximal ureter  Complicated UTI  Leukocytosis   -Patient with left flank pain and left upper quadrant pain that started on 11/16  - CT abdomen pelvis revealing 5 mm obstructing calculus within the left proximal ureter causing mild to moderate left hydronephrosis.   - Urology consulted, status post cystoscopy, left ureteroscopy, laser lithotripsy, basket stone extraction, ureteral stent placement on 11/17.    - due to ongoing pain/spasms, she will stay the night  -Plan for stent and string removal in 3 days      Expected Discharge Location and Transportation: Home  Expected Discharge   Expected Discharge Date: 11/17/2024; Expected Discharge Time:      VTE Prophylaxis:  Mechanical VTE prophylaxis orders are present.         AM-PAC 6 Clicks Score (PT): 24 (11/17/24 0800)    CODE STATUS:   Code Status and Medical Interventions: CPR (Attempt to Resuscitate); Full Support   Ordered at: 11/16/24 6431     Code Status (Patient has no pulse and is not breathing):    CPR (Attempt to Resuscitate)     Medical Interventions (Patient has pulse or is breathing):    Full Support       Radha Bhandari MD  11/18/24

## 2024-11-18 NOTE — PROGRESS NOTES
"Urology    Patient Name: Marta MCCOY [facility]  Medical Record Number: [id number]  YOB: 1985     LOS: 0 days   Patient Care Team:  Paolo Bhandari MD as PCP - General (Family Medicine)    Chief Complaint:    Chief Complaint   Patient presents with    Flank Pain       Subjective     Interval History:     She feels better overall.  She has some left flank soreness and suprapubic pressure    Review of Systems:    The following systems were reviewed and negative;  constitution, respiratory, and cardiovascular    Objective     Vital Signs  /76 (BP Location: Left arm, Patient Position: Lying)   Pulse 89   Temp 98.7 °F (37.1 °C) (Oral)   Resp 18   Ht 162.6 cm (64\")   Wt 112 kg (247 lb 9.6 oz)   SpO2 95%   BMI 42.50 kg/m²   I/O last 3 completed shifts:  In: 800 [I.V.:800]  Out: 1400 [Urine:1400]  No intake/output data recorded.      Physical Exam:  General Appearance: No acute distress, sitting up in bed, pleasant, appears comfortable  Lungs: Respirations regular, even and  unlabored     Results Review:     I reviewed the patient's new clinical results.  Lab Results (last 24 hours)       Procedure Component Value Units Date/Time    Blood Culture - Blood, Hand, Left [[id number]] Collected: 11/16/24 2045    Specimen: Blood from Hand, Left Updated: 11/17/24 2132    Blood Culture - Blood, Arm, Right [[id number]] Collected: 11/16/24 2030    Specimen: Blood from Arm, Right Updated: 11/17/24 2131    Basic Metabolic Panel [[id number]]  (Abnormal) Collected: 11/17/24 0902    Specimen: Blood Updated: 11/17/24 0938     Glucose 114 mg/dL      BUN 12 mg/dL      Creatinine 0.60 mg/dL      Sodium 136 mmol/L      Potassium 3.8 mmol/L      Chloride 105 mmol/L      CO2 22.0 mmol/L      Calcium 7.8 mg/dL      BUN/Creatinine Ratio 20.0     Anion Gap 9.0 mmol/L      eGFR 118.0 mL/min/1.73     Narrative:      GFR Normal >60  Chronic Kidney Disease <60  Kidney Failure <15      Magnesium [[id number]]  (Normal) Collected: 11/17/24 " 0902    Specimen: Blood Updated: 11/17/24 0938     Magnesium 2.1 mg/dL     CBC Auto Differential [[id number]]  (Abnormal) Collected: 11/17/24 0902    Specimen: Blood Updated: 11/17/24 0924     WBC 7.02 10*3/mm3      RBC 3.71 10*6/mm3      Hemoglobin 11.2 g/dL      Hematocrit 34.7 %      MCV 93.5 fL      MCH 30.2 pg      MCHC 32.3 g/dL      RDW 13.2 %      RDW-SD 45.7 fl      MPV 10.3 fL      Platelets 190 10*3/mm3      Neutrophil % 57.7 %      Lymphocyte % 30.6 %      Monocyte % 8.4 %      Eosinophil % 3.1 %      Basophil % 0.1 %      Immature Grans % 0.1 %      Neutrophils, Absolute 4.04 10*3/mm3      Lymphocytes, Absolute 2.15 10*3/mm3      Monocytes, Absolute 0.59 10*3/mm3      Eosinophils, Absolute 0.22 10*3/mm3      Basophils, Absolute 0.01 10*3/mm3      Immature Grans, Absolute 0.01 10*3/mm3      nRBC 0.0 /100 WBC             Medication Review:    Current Facility-Administered Medications:     acetaminophen (TYLENOL) tablet 500 mg, 500 mg, Oral, Q6H, Guille Gutiérrez DO, 500 mg at 11/18/24 0308    sennosides-docusate (PERICOLACE) 8.6-50 MG per tablet 2 tablet, 2 tablet, Oral, BID, 2 tablet at 11/17/24 0004 **AND** polyethylene glycol (MIRALAX) packet 17 g, 17 g, Oral, Daily PRN **AND** bisacodyl (DULCOLAX) EC tablet 5 mg, 5 mg, Oral, Daily PRN **AND** bisacodyl (DULCOLAX) suppository 10 mg, 10 mg, Rectal, Daily PRN, Ton Harvey MD    Calcium Replacement - Follow Nurse / BPA Driven Protocol, , Not Applicable, PRN, Ton Harvey MD    cefTRIAXone (ROCEPHIN) 2,000 mg in sodium chloride 0.9 % 100 mL MBP, 2,000 mg, Intravenous, Q24H, Ton Harvey MD, Last Rate: 200 mL/hr at 11/17/24 2033, 2,000 mg at 11/17/24 2033    famotidine (PEPCID) tablet 40 mg, 40 mg, Oral, Daily, Ton Harvey MD, 40 mg at 11/17/24 0829    HYDROcodone-acetaminophen (NORCO) 7.5-325 MG per tablet 1 tablet, 1 tablet, Oral, Q6H PRN, Guille Gutiérrez, , 1 tablet at 11/18/24 0403    HYDROmorphone (DILAUDID) injection 0.5 mg, 0.5  mg, Intravenous, Q2H PRN, 0.5 mg at 11/18/24 0445 **AND** naloxone (NARCAN) injection 0.4 mg, 0.4 mg, Intravenous, Q5 Min PRN, Ton Harvey MD    lactated ringers infusion, 9 mL/hr, Intravenous, Continuous, Emmanuelle Jacobs CRNA, Last Rate: 9 mL/hr at 11/17/24 1138, 9 mL/hr at 11/17/24 1138    Lidocaine 4 % 1 patch, 1 patch, Transdermal, Q24H, Guille Gutiérrez DO, 1 patch at 11/17/24 1421    Magnesium Standard Dose Replacement - Follow Nurse / BPA Driven Protocol, , Not Applicable, PRN, Ton Harvey MD    melatonin tablet 5 mg, 5 mg, Oral, Nightly PRN, Ton Harvey MD, 5 mg at 11/17/24 2159    nitroglycerin (NITROSTAT) SL tablet 0.4 mg, 0.4 mg, Sublingual, Q5 Min PRN, Ton Harvey MD    ondansetron ODT (ZOFRAN-ODT) disintegrating tablet 4 mg, 4 mg, Oral, Q6H PRN **OR** ondansetron (ZOFRAN) injection 4 mg, 4 mg, Intravenous, Q6H PRN, Ton Harvey MD    Phosphorus Replacement - Follow Nurse / BPA Driven Protocol, , Not Applicable, PRNWes Stephen J., MD    Potassium Replacement - Follow Nurse / BPA Driven Protocol, , Not Applicable, PRN, Ton Harvey MD    Sodium Chloride (PF) 0.9 % 10 mL, 10 mL, Intravenous, PRN, Ton Harvey MD    sodium chloride 0.9 % flush 10 mL, 10 mL, Intravenous, Q12H, Ton Harvey MD, 10 mL at 11/17/24 2034    sodium chloride 0.9 % flush 10 mL, 10 mL, Intravenous, PRN, Ton Harvey MD    sodium chloride 0.9 % infusion 40 mL, 40 mL, Intravenous, PRN, Ton Harvey MD    tamsulosin (FLOMAX) 24 hr capsule 0.4 mg, 0.4 mg, Oral, Nightly, Ton Harvey MD, 0.4 mg at 11/17/24 2025    Assessment and Plan    39-year-old white female with a history of UTIs, pyelonephritis, and urolithiasis.  She was admitted at [facility] in 2020 for pyelonephritis.  She underwent stone extraction 5/2024 at .     She was admitted for obstructing left ureteral stone and possible UTI.  A CT scan with contrast 11/16/2024 at [facility] showed a 5 mm left  proximal ureteral stone, bilateral tiny stones, and left lower pole cyst.  She started Rocephin and tamsulosin.  Urine and blood cultures are pending.  She underwent left ureteroscopic laser lithotripsy 11/17/2024.    She is doing well after left ureteroscopic laser lithotripsy yesterday.  She has good urine output.  She is afebrile and hemodynamically stable.    Plan: She may go home when deemed appropriate by the hospitalist.  She was instructed to remove the stent and strings in 2 days on Wednesday morning and may come to the office if needed.  She may follow-up with me in 3 to 4 weeks.      Renal stone    Acute flank pain    Hydronephrosis, left          Plan for disposition:Where: home and When:  today    Ton Harvey MD  11/18/24  07:28 EST

## 2024-11-18 NOTE — CASE MANAGEMENT/SOCIAL WORK
Discharge Planning Assessment  [facility]     Patient Name: Marta Amaya  MRN: [id number]  Today's Date: 11/18/2024    Admit Date: 11/16/2024    Plan: Home   Discharge Needs Assessment       Row Name 11/18/24 1009       Living Environment    People in Home spouse    Name(s) of People in Home Harry Cabellospouse [phone]    Current Living Arrangements home    Primary Care Provided by self    Provides Primary Care For no one    Family Caregiver if Needed spouse    Family Caregiver Names Harry Cabellospouse [phone]    Quality of Family Relationships unable to assess    Able to Return to Prior Arrangements yes       Transition Planning    Patient/Family Anticipates Transition to home with family    Patient/Family Anticipated Services at Transition     Transportation Anticipated family or friend will provide       Discharge Needs Assessment    Equipment Currently Used at Home none                   Discharge Plan       Row Name 11/18/24 1014       Plan    Plan Home    Patient/Family in Agreement with Plan yes    Plan Comments Met with Ms. Amaya in her room, to initiate discharge planning. She lives with her  in [facility]. She verified she has [place] [employer]. She has prescription coverage and uses [facility] in [place] to get her prescriptions filled. Ms. Amaya's PCP is Paolo Bhandari MD. Prior to admission, she was independent with ADL's. She uses no medical equipment at home and is not current with home health. Her plan at discharge is home. Family will transport. CM will continue to follow for medical readiness and will assist with discharge needs as indicated.    Final Discharge Disposition Code 01 - home or self-care                  Continued Care and Services - Admitted Since 11/16/2024    No active coordination exists for this encounter.       Expected Discharge Date and Time       Expected Discharge Date Expected Discharge Time    Nov 19, 2024             Demographic Summary       Row Name 11/18/24 1008       General Information    Admission Type observation    Arrived From emergency department    Referral Source admission list    Reason for Consult discharge planning    Preferred Language English       Contact Information    Permission Granted to Share Info With     Contact Information Obtained for                    Functional Status       Row Name 11/18/24 1009       Functional Status    Usual Activity Tolerance moderate    Current Activity Tolerance moderate       Functional Status, IADL    Medications independent    Meal Preparation independent    Housekeeping independent    Laundry independent    Shopping independent                   Psychosocial    No documentation.                  Abuse/Neglect    No documentation.                  Legal    No documentation.                  Substance Abuse    No documentation.                  Patient Forms    No documentation.                     Comfort Herrera RN

## 2024-11-19 ENCOUNTER — READMISSION MANAGEMENT (OUTPATIENT)
Dept: CALL CENTER | Facility: HOSPITAL | Age: 39
End: 2024-11-19
Payer: COMMERCIAL

## 2024-11-19 VITALS
HEIGHT: 64 IN | RESPIRATION RATE: 18 BRPM | DIASTOLIC BLOOD PRESSURE: 69 MMHG | TEMPERATURE: 98.8 F | BODY MASS INDEX: 42.27 KG/M2 | SYSTOLIC BLOOD PRESSURE: 131 MMHG | HEART RATE: 64 BPM | OXYGEN SATURATION: 95 % | WEIGHT: 247.6 LBS

## 2024-11-19 LAB
BACTERIA SPEC AEROBE CULT: NO GROWTH
BACTERIA UR QL AUTO: ABNORMAL /HPF
BILIRUB UR QL STRIP: NEGATIVE
CLARITY UR: ABNORMAL
COLOR UR: ABNORMAL
GLUCOSE UR STRIP-MCNC: NEGATIVE MG/DL
HGB UR QL STRIP.AUTO: ABNORMAL
HYALINE CASTS UR QL AUTO: ABNORMAL /LPF
KETONES UR QL STRIP: NEGATIVE
LEUKOCYTE ESTERASE UR QL STRIP.AUTO: ABNORMAL
NITRITE UR QL STRIP: NEGATIVE
PH UR STRIP.AUTO: 5.5 [PH] (ref 5–8)
PROT UR QL STRIP: ABNORMAL
RBC # UR STRIP: ABNORMAL /HPF
REF LAB TEST METHOD: ABNORMAL
SP GR UR STRIP: 1.02 (ref 1–1.03)
SQUAMOUS #/AREA URNS HPF: ABNORMAL /HPF
UROBILINOGEN UR QL STRIP: ABNORMAL
WBC # UR STRIP: ABNORMAL /HPF

## 2024-11-19 PROCEDURE — 87086 URINE CULTURE/COLONY COUNT: CPT | Performed by: INTERNAL MEDICINE

## 2024-11-19 PROCEDURE — 25010000002 KETOROLAC TROMETHAMINE PER 15 MG: Performed by: INTERNAL MEDICINE

## 2024-11-19 PROCEDURE — 25010000002 HYDROMORPHONE PER 4 MG: Performed by: UROLOGY

## 2024-11-19 PROCEDURE — 99239 HOSP IP/OBS DSCHRG MGMT >30: CPT | Performed by: INTERNAL MEDICINE

## 2024-11-19 PROCEDURE — 81001 URINALYSIS AUTO W/SCOPE: CPT | Performed by: INTERNAL MEDICINE

## 2024-11-19 RX ORDER — DIAZEPAM 5 MG/1
5 TABLET ORAL EVERY 6 HOURS PRN
Qty: 12 TABLET | Refills: 0 | Status: SHIPPED | OUTPATIENT
Start: 2024-11-19 | End: 2024-11-23

## 2024-11-19 RX ORDER — TAMSULOSIN HYDROCHLORIDE 0.4 MG/1
0.4 CAPSULE ORAL NIGHTLY
Qty: 30 CAPSULE | Refills: 0 | Status: SHIPPED | OUTPATIENT
Start: 2024-11-19

## 2024-11-19 RX ORDER — HYDROCODONE BITARTRATE AND ACETAMINOPHEN 7.5; 325 MG/1; MG/1
1 TABLET ORAL EVERY 6 HOURS PRN
Qty: 12 TABLET | Refills: 0 | Status: SHIPPED | OUTPATIENT
Start: 2024-11-19 | End: 2024-11-22

## 2024-11-19 RX ORDER — KETOROLAC TROMETHAMINE 30 MG/ML
30 INJECTION, SOLUTION INTRAMUSCULAR; INTRAVENOUS ONCE
Status: COMPLETED | OUTPATIENT
Start: 2024-11-19 | End: 2024-11-19

## 2024-11-19 RX ADMIN — DIAZEPAM 5 MG: 5 TABLET ORAL at 16:19

## 2024-11-19 RX ADMIN — HYDROMORPHONE HYDROCHLORIDE 0.5 MG: 1 INJECTION, SOLUTION INTRAMUSCULAR; INTRAVENOUS; SUBCUTANEOUS at 04:50

## 2024-11-19 RX ADMIN — ACETAMINOPHEN 500 MG: 500 TABLET, FILM COATED ORAL at 14:42

## 2024-11-19 RX ADMIN — Medication 10 ML: at 08:38

## 2024-11-19 RX ADMIN — KETOROLAC TROMETHAMINE 30 MG: 30 INJECTION, SOLUTION INTRAMUSCULAR; INTRAVENOUS at 09:52

## 2024-11-19 RX ADMIN — FAMOTIDINE 40 MG: 20 TABLET, FILM COATED ORAL at 08:24

## 2024-11-19 RX ADMIN — HYDROMORPHONE HYDROCHLORIDE 0.5 MG: 1 INJECTION, SOLUTION INTRAMUSCULAR; INTRAVENOUS; SUBCUTANEOUS at 00:04

## 2024-11-19 RX ADMIN — DIAZEPAM 5 MG: 5 TABLET ORAL at 09:57

## 2024-11-19 RX ADMIN — HYDROMORPHONE HYDROCHLORIDE 0.5 MG: 1 INJECTION, SOLUTION INTRAMUSCULAR; INTRAVENOUS; SUBCUTANEOUS at 02:26

## 2024-11-19 RX ADMIN — ACETAMINOPHEN 500 MG: 500 TABLET, FILM COATED ORAL at 08:24

## 2024-11-19 RX ADMIN — ACETAMINOPHEN 500 MG: 500 TABLET, FILM COATED ORAL at 02:25

## 2024-11-19 RX ADMIN — HYDROMORPHONE HYDROCHLORIDE 0.5 MG: 1 INJECTION, SOLUTION INTRAMUSCULAR; INTRAVENOUS; SUBCUTANEOUS at 06:58

## 2024-11-19 RX ADMIN — DIAZEPAM 5 MG: 5 TABLET ORAL at 03:42

## 2024-11-19 RX ADMIN — HYDROCODONE BITARTRATE AND ACETAMINOPHEN 1 TABLET: 7.5; 325 TABLET ORAL at 12:50

## 2024-11-19 RX ADMIN — KETOROLAC TROMETHAMINE 30 MG: 30 INJECTION, SOLUTION INTRAMUSCULAR; INTRAVENOUS at 17:32

## 2024-11-19 RX ADMIN — LIDOCAINE 1 PATCH: 4 PATCH TOPICAL at 08:24

## 2024-11-19 NOTE — OUTREACH NOTE
Prep Survey      Flowsheet Row Responses   [facility] facility patient discharged from? [facility]   Is LACE score < 7 ? Yes   Eligibility [facility]   Date of Admission 11/16/24   Date of Discharge 11/19/24   Discharge Disposition Home or Self Care   Discharge diagnosis Cystoscopy ureteroscopy with lasewr and stent placement   Does the patient have one of the following disease processes/diagnoses(primary or secondary)? Other   Does the patient have Home health ordered? No   Is there a DME ordered? No   Prep survey completed? Yes            SABINE BORRERO - Registered Nurse

## 2024-11-19 NOTE — PAYOR COMM NOTE
"Marta Emery (39 y.o. Female)       Date of Birth   1985    Social Security Number       Address   [address] [id number]    Home Phone   [phone]    MRN   [id number]       Alevism   None    Marital Status                               Admission Date   24    Admission Type   Emergency    Admitting Provider   Radha Bhandari MD    Attending Provider   Radha Bhandari MD    Department, Room/Bed   [facility], S343/1       Discharge Date       Discharge Disposition       Discharge Destination                                 Attending Provider: Radha Bhandari MD    Allergies: No Known Allergies    Isolation: None   Infection: None   Code Status: CPR    Ht: 162.6 cm (64\")   Wt: 112 kg (247 lb 9.6 oz)    Admission Cmt: None   Principal Problem: Renal stone [N20.0]                   Active Insurance as of 2024       Primary Coverage       Payor Plan Insurance Group Employer/Plan Group    [employer]HE[employer] BLUE CROSS ANTHEM BLUE CROSS BLUE SHIELD PPO [id number]       Payor Plan Address Payor Plan Phone Number Payor Plan Fax Number Effective Dates    [address] [phone]  2024 - None Entered    [facility] [address]         Subscriber Name Subscriber Birth Date Member ID       ABBEY EMERY 1981 [id number]                     Emergency Contacts        (Rel.) Home Phone Work Phone Mobile Phone    Abbey Emery (Spouse) [phone] -- [phone]    OrdonezSapna rios (Mother) -- -- [phone]             [facility]  [address] [place] [address]  Phone:  [phone]  Fax:  [phone]        Patient:     Marta Emery MRN:  [id number]   [address] [facility] [address] :  1985  SSN:    Phone: [phone] Sex:  F      INSURANCE PAYOR PLAN GROUP # SUBSCRIBER ID   Primary:    [employer]HE[employer] BLUE CROSS [id number]   Admitting Diagnosis: Renal stone [N20.0]  Order Date:  " 2024        Inpatient Admission       (Order ID: [id number])     Diagnosis:         Priority:  Routine Expected Date:   Expiration Date:        Interval:   Count:    Level of Care: Telemetry [5]  Diagnosis: Renal stone []  Certification: I Certify That Inpatient Hospital Services Are Medically Necessary For Greater Than 2 Midnights     Specimen Type:   Specimen Source:   Specimen Taken Date:   Specimen Taken Time:                  Verbal Order Mode: Verbal with readback   Authorizing Provider: Radha Bhandari MD  Authorizing Provider's NPI: [id number]     Order Entered By: Jg Galicia RN 2024  7:54 AM     Electronically signed by: Radha Bhandari MD 2024  7:55 AM          History & Physical        Bekah Art APRN at 24 7575       Attestation signed by Shiva Marsh MD at 24 3061    I have reviewed this documentation and agree.                      [facility] Medicine Services  HISTORY AND PHYSICAL    Patient Name: Marta Amaya  : 1985  MRN: [id number]  Primary Care Physician: Paolo Bhandari MD  Date of admission: 2024    Subjective  Subjective     Chief Complaint:  L flank pain    HPI:  Marta Amaya is a 39 y.o. female with PMHx of kidney stones, pyelonephritis with R ureteral stent at [facility] 5/10/2024 (subsequently removed ) who presented to [facility] for evaluation of L flank pain / LUQ pain that started acutely ~ 6am today. She had been on amoxicillin for a dental procedure and has been taking amoxicillin, she took that this morning as well as ibuprofen and azo to see if this would help with her pain. States this pain is the same as prior kidney stones. She's had low grade fever, 99 and mild chills. No nausea, vomiting or diarrhea. No blood in her urine. She has been eating and drinking. Admission has been requested for further evaluation at [facility].    Review of Systems   Constitutional:  Positive for chills and fever.  Negative for appetite change.   Gastrointestinal:  Positive for abdominal pain. Negative for abdominal distention, diarrhea, nausea and vomiting.   Genitourinary:  Positive for dysuria and flank pain. Negative for hematuria.   All other systems reviewed and are negative.         Personal History     Past Medical History:   Diagnosis Date    Cellulitis     Kidney stones     Obesity     Skin lesion of breast     URI (upper respiratory infection)              Past Surgical History:   Procedure Laterality Date    APPENDECTOMY      TONSILLECTOMY      TUBAL ABDOMINAL LIGATION         Family History:   family history includes Diabetes in an other family member; Heart disease in an other family member; Hypertension in her father and another family member; No Known Problems in her mother.     Social History:  reports that she has been smoking cigarettes. She started smoking about 8 years ago. She has never used smokeless tobacco. She reports that she does not drink alcohol and does not use drugs.  Social History     Social History Narrative    Not on file       Medications:  acetaminophen, amoxicillin, ibuprofen, and phenazopyridine    No Known Allergies    Objective  Objective     Vital Signs:   Temp:  [97.8 °F (36.6 °C)-99.6 °F (37.6 °C)] 97.8 °F (36.6 °C)  Heart Rate:  [] 82  Resp:  [16-22] 16  BP: (134-159)/(74-98) 159/94    Physical Exam  Constitutional:       General: She is not in acute distress.     Appearance: She is well-developed. She is ill-appearing. She is not toxic-appearing.   HENT:      Head: Normocephalic and atraumatic.      Nose: Nose normal.      Mouth/Throat:      Pharynx: Oropharynx is clear.   Eyes:      Extraocular Movements: Extraocular movements intact.      Conjunctiva/sclera: Conjunctivae normal.      Pupils: Pupils are equal, round, and reactive to light.   Cardiovascular:      Rate and Rhythm: Normal rate and regular rhythm.      Pulses: Normal pulses.      Heart sounds: Normal heart  sounds. No murmur heard.  Pulmonary:      Effort: Pulmonary effort is normal.      Breath sounds: Normal breath sounds.   Abdominal:      General: Bowel sounds are normal. There is no distension.      Palpations: Abdomen is soft.      Tenderness: There is no abdominal tenderness.   Musculoskeletal:         General: No swelling. Normal range of motion.      Cervical back: Normal range of motion and neck supple.   Skin:     General: Skin is warm and dry.      Capillary Refill: Capillary refill takes less than 2 seconds.      Findings: No rash.   Neurological:      Mental Status: She is alert and oriented to person, place, and time.      Cranial Nerves: No cranial nerve deficit.   Psychiatric:         Mood and Affect: Mood normal.         Behavior: Behavior normal.             Result Review:  I have personally reviewed the results from the time of this admission to 11/16/2024 23:15 EST and agree with these findings:  [x]  Laboratory list / accordion  []  Microbiology  [x]  Radiology  []  EKG/Telemetry   []  Cardiology/Vascular   []  Pathology  []  Old records  []  Other:  Most notable findings include:      LAB RESULTS:      Lab 11/16/24  1855   WBC 10.97*   HEMOGLOBIN 13.5   HEMATOCRIT 40.5   PLATELETS 243   NEUTROS ABS 8.35*   IMMATURE GRANS (ABS) 0.01   LYMPHS ABS 1.76   MONOS ABS 0.67   EOS ABS 0.16   MCV 89.4   PROCALCITONIN 0.04   LACTATE 1.1         Lab 11/16/24  1855   SODIUM 138   POTASSIUM 3.8   CHLORIDE 104   CO2 24.7   ANION GAP 9.3   BUN 11   CREATININE 0.69   EGFR 114.1   GLUCOSE 98   CALCIUM 9.0         Lab 11/16/24  1855   TOTAL PROTEIN 7.0   ALBUMIN 4.1   GLOBULIN 2.9   ALT (SGPT) 21   AST (SGOT) 23   BILIRUBIN 0.4   ALK PHOS 94   LIPASE 25                     Brief Urine Lab Results  (Last result in the past 365 days)        Color   Clarity   Blood   Leuk Est   Nitrite   Protein   CREAT   Urine HCG        11/16/24 1855 Orange  Comment: Color of urine may affect dipstick results.    Slightly Cloudy    Small (1+)   Moderate (2+)   Positive   >=300 mg/dL (3+)                 Microbiology Results (last 10 days)       ** No results found for the last 240 hours. **            CT Abdomen Pelvis With Contrast    Result Date: 11/16/2024  CT ABDOMEN PELVIS W CONTRAST Date of Exam: 11/16/2024 7:38 PM EST Indication: Left flank pain. Comparison: CT of the abdomen and pelvis dated 11/14/2020 Technique: Axial CT images were obtained of the abdomen and pelvis following the uneventful intravenous administration of 90 mL Isovue 300. Reconstructed coronal and sagittal images were also obtained. Automated exposure control and iterative construction methods were used. Findings: Liver: The liver is unremarkable in morphology and decreased in attenuation. No focal liver lesion is seen. No biliary dilation is seen. Gallbladder: Unremarkable. Pancreas: Unremarkable. Spleen: Unremarkable. Adrenal glands: Unremarkable. Genitourinary tract: 5 mm obstructing calculus within the left proximal ureter causes mild to moderate left hydronephrosis. There are multiple punctate nonobstructing calculi within both kidneys, ranging in size from 1 to 3 mm. No hydronephrosis on the right. The visualized portion of the right ureter is unremarkable. Urinary bladder and pelvic organs demonstrate no acute abnormality. Gastrointestinal tract: The visualized hollow viscera demonstrate no focal lesion. There is no evidence of bowel obstruction. Appendix: The appendix is not identified. Other findings: No free air or free fluid is identified. No pathologically enlarged lymph nodes are seen. The abdominal aorta and IVC appear unremarkable. Bones and soft tissues: No acute or suspicious osseous or soft tissue lesion is identified. Lung bases: The visualized lung bases are clear.     Impression: Impression: 1.5 mm obstructing calculus within the left proximal ureter causes mild to moderate left hydronephrosis. 2.Additional bilateral punctate nonobstructive  nephrolithiasis. 3.Hepatic steatosis. 4.Additional findings as detailed above. Electronically Signed: Maldonado Decker MD  11/16/2024 8:16 PM EST  Workstation ID: [id number]         Assessment & Plan  Assessment & Plan       Renal stone    Acute flank pain    Hydronephrosis, left    Left obstructing kidney stone  Mild to moderate left hydronephrosis  - CT a/p w/ 5 mm obstructing calculus in the left proximal ureter w/ mild to moderate left hydronephrosis / additional bilateral nonobstructive nephrolithiasis  - continue rocephin, s/p 2 grams IV at OSH  - hx of enterococcus faecalis urine cx (3/4/2017)  - culture obtained at OSH, follow results  - NPO after MN  - pain control PRN  - nausea control PRN  - LR @ 100 ml/hr x 10 hrs  - urology consult in am        DVT prophylaxis:  SCDS    CODE STATUS:     Code Status (Patient has no pulse and is not breathing): CPR (Attempt to Resuscitate)  Medical Interventions (Patient has pulse or is breathing): Full Support      Expected Discharge    Expected Discharge Date: 11/17/2024; Expected Discharge Time:       Signature: Electronically signed by ALETHEA Valiente, 11/16/24, 11:15 PM EST    Total time spent: 45 minutes  Time spent includes time reviewing chart, face-to-face time, counseling patient/family/caregiver, ordering medications/tests/procedures, communicating with other health care professionals, documenting clinical information in the electronic health record, and coordination of care.              Electronically signed by Shiva Marsh MD at 11/17/24 0443       Facility-Administered Medications as of 11/19/2024   Medication Dose Route Frequency Provider Last Rate Last Admin    acetaminophen (TYLENOL) tablet 500 mg  500 mg Oral Q6H Guille Gutiérrez DO   500 mg at 11/19/24 0824    sennosides-docusate (PERICOLACE) 8.6-50 MG per tablet 2 tablet  2 tablet Oral BID Ton Harvey MD   2 tablet at 11/18/24 2034    And    polyethylene glycol (MIRALAX) packet 17 g  17 g  Oral Daily PRN Ton Harvey MD        And    bisacodyl (DULCOLAX) EC tablet 5 mg  5 mg Oral Daily PRN Ton Harvey MD        And    bisacodyl (DULCOLAX) suppository 10 mg  10 mg Rectal Daily PRN Ton Harvey MD        Calcium Replacement - Follow Nurse / BPA Driven Protocol   Not Applicable PRN Ton Harvey MD        [COMPLETED] cefTRIAXone (ROCEPHIN) 2,000 mg in sodium chloride 0.9 % 100 mL MBP  2,000 mg Intravenous Once Kenisha Stout PA-C 200 mL/hr at 24 2,000 mg at 24    cefTRIAXone (ROCEPHIN) 2,000 mg in sodium chloride 0.9 % 100 mL MBP  2,000 mg Intravenous Q24H Ton Harvey  mL/hr at 24 2,000 mg at 24    diazePAM (VALIUM) tablet 5 mg  5 mg Oral Q6H PRN Radha Bhandari MD   5 mg at 24 0957    famotidine (PEPCID) tablet 40 mg  40 mg Oral Daily Ton Harvey MD   40 mg at 24 0824    HYDROcodone-acetaminophen (NORCO) 7.5-325 MG per tablet 1 tablet  1 tablet Oral Q6H PRN Guille Gutiérrez DO   1 tablet at 24 2248    [COMPLETED] HYDROmorphone (DILAUDID) injection 1 mg  1 mg Intravenous Once Julio Baker DO   1 mg at 24    [COMPLETED] iopamidol (ISOVUE-300) 61 % injection 100 mL  100 mL Intravenous Once in imaging Uriel De León DO   100 mL at 24    [COMPLETED] ketorolac (TORADOL) injection 15 mg  15 mg Intravenous Once Julio Baker DO   15 mg at 24    [COMPLETED] ketorolac (TORADOL) injection 30 mg  30 mg Intravenous Once Radha Bhandari MD   30 mg at 24 0952    [] lactated ringers infusion  100 mL/hr Intravenous Continuous Bekah Art APRN 100 mL/hr at 24 0850 100 mL/hr at 24 0850    [] lactated ringers infusion  9 mL/hr Intravenous Continuous Emmanuelle Jacobs CRNA 9 mL/hr at 24 1138 9 mL/hr at 24 1138    Lidocaine 4 % 1 patch  1 patch Transdermal Q24H Guille Gutiérrez DO   1 patch at 24 0824    Magnesium Standard Dose  Replacement - Follow Nurse / BPA Driven Protocol   Not Applicable Ton Helms MD        melatonin tablet 5 mg  5 mg Oral Nightly PRN Ton Harvey MD   5 mg at 11/18/24 2034    nitroglycerin (NITROSTAT) SL tablet 0.4 mg  0.4 mg Sublingual Q5 Min PRTon Griffin MD        ondansetron ODT (ZOFRAN-ODT) disintegrating tablet 4 mg  4 mg Oral Q6H PRN Ton Harvey MD        Or    ondansetron (ZOFRAN) injection 4 mg  4 mg Intravenous Q6H PRN Ton Harvey MD        Phosphorus Replacement - Follow Nurse / BPA Driven Protocol   Not Applicable PRTon Griffin MD        Potassium Replacement - Follow Nurse / BPA Driven Protocol   Not Applicable Ton Helms MD        Sodium Chloride (PF) 0.9 % 10 mL  10 mL Intravenous PRN Ton Harvey MD        [COMPLETED] sodium chloride 0.9 % bolus 1,000 mL  1,000 mL Intravenous Once Julio Baker DO 2,000 mL/hr at 11/16/24 1929 1,000 mL at 11/16/24 1929    sodium chloride 0.9 % flush 10 mL  10 mL Intravenous Q12H Ton Harvey MD   10 mL at 11/19/24 0838    sodium chloride 0.9 % flush 10 mL  10 mL Intravenous PRN Ton Harvey MD        sodium chloride 0.9 % infusion 40 mL  40 mL Intravenous PRN Ton Harvey MD        tamsulosin (FLOMAX) 24 hr capsule 0.4 mg  0.4 mg Oral Nightly Ton Harvey MD   0.4 mg at 11/18/24 2034        ED to Hosp-Admission  11/16/2024  Marta Amaya  MRN: [id number]     All Administrations of HYDROmorphone (DILAUDID) injection 0.5 mg     suggestion  The administrations shown are only for this specific order and not for other orders for the same medication that may be in this encounter.     Administration Action Time Recorded Time Documented By Site Comment Reason   Given : 0.5 mg :   : Intravenous 11/19/24 0658 11/19/24 0701 Lida Rosario, RN      Given : 0.5 mg :   : Intravenous 11/19/24 0450 11/19/24 0450 Lida Rosario, RN      Given : 0.5 mg :   : Intravenous 11/19/24  0226 24 0226 Lida Rosario RN      Given : 0.5 mg :   : Intravenous 24 0004 24 0005 Lida Rosario RN      Given : 0.5 mg :   : Intravenous 24 Lida Rosario RN      Given : 0.5 mg :   : Intravenous 24 1852 24 1852 Tri Márquez RN      Given : 0.5 mg :   : Intravenous 24 1614 24 1614 Tri Márquez RN      Given : 0.5 mg :   : Intravenous 24 1410 24 1410 Tri Márquez RN      Given : 0.5 mg :   : Intravenous 24 1048 24 1048 Tri Márquez RN      Given : 0.5 mg :   : Intravenous 24 0802 24 0803 Tri Márquez RN      Given : 0.5 mg :   : Intravenous 24 0445 24 0445 Diane Santiago RN      Given : 0.5 mg :   : Intravenous 24 0200 24 0200 Diane Santiago RN      Given : 0.5 mg :   : Intravenous 24 2329 24 2329 Diane Santiago RN      Given : 0.5 mg :   : Intravenous 24 2033 24 Diane Santiago RN      Given : 0.5 mg :   : Intravenous 24 1738 24 1738 Mary Leonard RN      Given : 0.5 mg :   : Intravenous 24 1538 24 1538 Mary Leonard RN      Not Given : 0.5 mg :   : Intravenous 24 1351 24 1351 Mary Leonard RN  Norco given instead Other (Comment Required)   Given : 0.5 mg :   : Intravenous 24 1136 24 1136 Mary Leonard RN      Given : 0.5 mg :   : Intravenous 24 0829 24 0830 Mary Leonard RN      Given : 0.5 mg :   : Intravenous 24 0417 24 0417 Ramesh Flores RN      Given : 0.5 mg :   : Intravenous 24 0227 24 0229 Ramesh Flores RN      Given : 0.5 mg :   : Intravenous 24 0004 24 0004 Ramesh Flores RN             Physician Progress Notes (all)        Radha Bhandari MD at 24 0819              [facility] Medicine Services  PROGRESS NOTE    Patient Name: Marta Amaya  : 1985  MRN: [id number]    Date of Admission: 2024  Primary  Care Physician: Paolo Bhandari MD    Subjective   Subjective     CC:  Left flank pain left upper quadrant pain    HPI:  having L flank pain with spasms most of the day, got better w valium.  Cannot sleep.        Objective   Objective     Vital Signs:   Temp:  [97.9 °F (36.6 °C)-98.8 °F (37.1 °C)] 98.7 °F (37.1 °C)  Heart Rate:  [] 89  Resp:  [16-18] 18  BP: ()/(54-82) 130/76  Flow (L/min) (Oxygen Therapy):  [2-3] 2     Gen:  WD/WN  Neuro: alert and oriented, clear speech, follows commands, grossly nonfocal  HEENT:  NC/AT   Neck:  Supple, no LAD  Heart RRR no murmur, rub, or gallop  Abd:  Soft, nontender,   L flank is tender to light palp   Extrem:  No c/c/e        Results Reviewed:  LAB RESULTS:      Lab 11/17/24  0902 11/16/24  1855   WBC 7.02 10.97*   HEMOGLOBIN 11.2* 13.5   HEMATOCRIT 34.7 40.5   PLATELETS 190 243   NEUTROS ABS 4.04 8.35*   IMMATURE GRANS (ABS) 0.01 0.01   LYMPHS ABS 2.15 1.76   MONOS ABS 0.59 0.67   EOS ABS 0.22 0.16   MCV 93.5 89.4   PROCALCITONIN  --  0.04   LACTATE  --  1.1         Lab 11/17/24  0902 11/16/24  1855   SODIUM 136 138   POTASSIUM 3.8 3.8   CHLORIDE 105 104   CO2 22.0 24.7   ANION GAP 9.0 9.3   BUN 12 11   CREATININE 0.60 0.69   EGFR 118.0 114.1   GLUCOSE 114* 98   CALCIUM 7.8* 9.0   MAGNESIUM 2.1  --          Lab 11/16/24  1855   TOTAL PROTEIN 7.0   ALBUMIN 4.1   GLOBULIN 2.9   ALT (SGPT) 21   AST (SGOT) 23   BILIRUBIN 0.4   ALK PHOS 94   LIPASE 25                     Brief Urine Lab Results  (Last result in the past 365 days)        Color   Clarity   Blood   Leuk Est   Nitrite   Protein   CREAT   Urine HCG        11/16/24 1855 Orange  Comment: Color of urine may affect dipstick results.    Slightly Cloudy   Small (1+)   Moderate (2+)   Positive   >=300 mg/dL (3+)                   Microbiology Results Abnormal       None            FL C Arm During Surgery    Result Date: 11/17/2024  This procedure was auto-finalized with no dictation required.    XR Chest 1  View    Result Date: 11/16/2024  XR CHEST 1 VW Date of Exam: 11/16/2024 11:09 PM EST Indication: pre op / leukcytosis Comparison: 1/16/2020. Findings: There are no airspace consolidations. No pleural fluid. No pneumothorax. The pulmonary vasculature appears within normal limits. The cardiac and mediastinal silhouette appear unremarkable. No acute osseous abnormality identified.     Impression: Impression: No acute cardiopulmonary process. Electronically Signed: Angelita Garner MD  11/16/2024 11:40 PM EST  Workstation ID: [id number]    CT Abdomen Pelvis With Contrast    Result Date: 11/16/2024  CT ABDOMEN PELVIS W CONTRAST Date of Exam: 11/16/2024 7:38 PM EST Indication: Left flank pain. Comparison: CT of the abdomen and pelvis dated 11/14/2020 Technique: Axial CT images were obtained of the abdomen and pelvis following the uneventful intravenous administration of 90 mL Isovue 300. Reconstructed coronal and sagittal images were also obtained. Automated exposure control and iterative construction methods were used. Findings: Liver: The liver is unremarkable in morphology and decreased in attenuation. No focal liver lesion is seen. No biliary dilation is seen. Gallbladder: Unremarkable. Pancreas: Unremarkable. Spleen: Unremarkable. Adrenal glands: Unremarkable. Genitourinary tract: 5 mm obstructing calculus within the left proximal ureter causes mild to moderate left hydronephrosis. There are multiple punctate nonobstructing calculi within both kidneys, ranging in size from 1 to 3 mm. No hydronephrosis on the right. The visualized portion of the right ureter is unremarkable. Urinary bladder and pelvic organs demonstrate no acute abnormality. Gastrointestinal tract: The visualized hollow viscera demonstrate no focal lesion. There is no evidence of bowel obstruction. Appendix: The appendix is not identified. Other findings: No free air or free fluid is identified. No pathologically enlarged lymph nodes are seen. The abdominal  aorta and IVC appear unremarkable. Bones and soft tissues: No acute or suspicious osseous or soft tissue lesion is identified. Lung bases: The visualized lung bases are clear.     Impression: Impression: 1.5 mm obstructing calculus within the left proximal ureter causes mild to moderate left hydronephrosis. 2.Additional bilateral punctate nonobstructive nephrolithiasis. 3.Hepatic steatosis. 4.Additional findings as detailed above. Electronically Signed: Maldonado Decker MD  11/16/2024 8:16 PM EST  Workstation ID: [id number]         Current medications:  Scheduled Meds:acetaminophen, 500 mg, Oral, Q6H  cefTRIAXone, 2,000 mg, Intravenous, Q24H  famotidine, 40 mg, Oral, Daily  Lidocaine, 1 patch, Transdermal, Q24H  senna-docusate sodium, 2 tablet, Oral, BID  sodium chloride, 10 mL, Intravenous, Q12H  tamsulosin, 0.4 mg, Oral, Nightly      Continuous Infusions:lactated ringers, 9 mL/hr, Last Rate: 9 mL/hr (11/17/24 1138)      PRN Meds:.  senna-docusate sodium **AND** polyethylene glycol **AND** bisacodyl **AND** bisacodyl    Calcium Replacement - Follow Nurse / BPA Driven Protocol    HYDROcodone-acetaminophen    HYDROmorphone **AND** naloxone    Magnesium Standard Dose Replacement - Follow Nurse / BPA Driven Protocol    melatonin    nitroglycerin    ondansetron ODT **OR** ondansetron    Phosphorus Replacement - Follow Nurse / BPA Driven Protocol    Potassium Replacement - Follow Nurse / BPA Driven Protocol    Sodium Chloride (PF)    sodium chloride    sodium chloride    Assessment & Plan   Assessment & Plan     Active Hospital Problems    Diagnosis  POA    **Renal stone [N20.0]  Yes    Hydronephrosis, left [N13.30]  Yes    Acute flank pain [R10.9]  Yes      Resolved Hospital Problems   No resolved problems to display.        Brief Hospital Course to date:  Marta Amaya is a 39 y.o. female with past medical history of kidney stones, pyelonephritis status post right ureteral stent at [facility] on 5/10/2024 with removal on  5/20/2024 presented to [facility] ED with left flank pain and left upper quadrant pain.  CT abdomen pelvis revealing 5 mm obstructing calculus within the left proximal ureter causing mild to moderate left hydronephrosis.  Urology consulted, status post cystoscopy, left ureteroscopy, laser lithotripsy, basket stone extraction, ureteral stent placement on 11/17.  UA consistent with infection.  Currently on IV antibiotics and adjusting pain control.    5 mm obstructing calculus within the left proximal ureter  Complicated UTI  Leukocytosis   -Patient with left flank pain and left upper quadrant pain that started on 11/16  - CT abdomen pelvis revealing 5 mm obstructing calculus within the left proximal ureter causing mild to moderate left hydronephrosis.   - Urology consulted, status post cystoscopy, left ureteroscopy, laser lithotripsy, basket stone extraction, ureteral stent placement on 11/17.    - due to ongoing pain/spasms, she will stay the night  -Plan for stent and string removal in 3 days      Expected Discharge Location and Transportation: Home  Expected Discharge   Expected Discharge Date: 11/17/2024; Expected Discharge Time:      VTE Prophylaxis:  Mechanical VTE prophylaxis orders are present.         AM-PAC 6 Clicks Score (PT): 24 (11/17/24 0800)    CODE STATUS:   Code Status and Medical Interventions: CPR (Attempt to Resuscitate); Full Support   Ordered at: 11/16/24 2638     Code Status (Patient has no pulse and is not breathing):    CPR (Attempt to Resuscitate)     Medical Interventions (Patient has pulse or is breathing):    Full Support       Radha Bhandari MD  11/18/24        Electronically signed by Radha Bhandari MD at 11/18/24 9262       Ton Harvey MD at 11/18/24 3922          Urology    Patient Name: Marta MCCOY [facility]  Medical Record Number: [id number]  YOB: 1985     LOS: 0 days   Patient Care Team:  Paolo Bhandari MD as PCP - General (Family Medicine)    Chief Complaint:    Chief  "Complaint   Patient presents with    Flank Pain       Subjective     Interval History:     She feels better overall.  She has some left flank soreness and suprapubic pressure    Review of Systems:    The following systems were reviewed and negative;  constitution, respiratory, and cardiovascular    Objective     Vital Signs  /76 (BP Location: Left arm, Patient Position: Lying)   Pulse 89   Temp 98.7 °F (37.1 °C) (Oral)   Resp 18   Ht 162.6 cm (64\")   Wt 112 kg (247 lb 9.6 oz)   SpO2 95%   BMI 42.50 kg/m²   I/O last 3 completed shifts:  In: 800 [I.V.:800]  Out: 1400 [Urine:1400]  No intake/output data recorded.      Physical Exam:  General Appearance: No acute distress, sitting up in bed, pleasant, appears comfortable  Lungs: Respirations regular, even and  unlabored     Results Review:     I reviewed the patient's new clinical results.  Lab Results (last 24 hours)       Procedure Component Value Units Date/Time    Blood Culture - Blood, Hand, Left [[id number]] Collected: 11/16/24 2045    Specimen: Blood from Hand, Left Updated: 11/17/24 2132    Blood Culture - Blood, Arm, Right [[id number]] Collected: 11/16/24 2030    Specimen: Blood from Arm, Right Updated: 11/17/24 2131    Basic Metabolic Panel [[id number]]  (Abnormal) Collected: 11/17/24 0902    Specimen: Blood Updated: 11/17/24 0938     Glucose 114 mg/dL      BUN 12 mg/dL      Creatinine 0.60 mg/dL      Sodium 136 mmol/L      Potassium 3.8 mmol/L      Chloride 105 mmol/L      CO2 22.0 mmol/L      Calcium 7.8 mg/dL      BUN/Creatinine Ratio 20.0     Anion Gap 9.0 mmol/L      eGFR 118.0 mL/min/1.73     Narrative:      GFR Normal >60  Chronic Kidney Disease <60  Kidney Failure <15      Magnesium [[id number]]  (Normal) Collected: 11/17/24 0902    Specimen: Blood Updated: 11/17/24 0938     Magnesium 2.1 mg/dL     CBC Auto Differential [[id number]]  (Abnormal) Collected: 11/17/24 0902    Specimen: Blood Updated: 11/17/24 0924     WBC 7.02 10*3/mm3      RBC " 3.71 10*6/mm3      Hemoglobin 11.2 g/dL      Hematocrit 34.7 %      MCV 93.5 fL      MCH 30.2 pg      MCHC 32.3 g/dL      RDW 13.2 %      RDW-SD 45.7 fl      MPV 10.3 fL      Platelets 190 10*3/mm3      Neutrophil % 57.7 %      Lymphocyte % 30.6 %      Monocyte % 8.4 %      Eosinophil % 3.1 %      Basophil % 0.1 %      Immature Grans % 0.1 %      Neutrophils, Absolute 4.04 10*3/mm3      Lymphocytes, Absolute 2.15 10*3/mm3      Monocytes, Absolute 0.59 10*3/mm3      Eosinophils, Absolute 0.22 10*3/mm3      Basophils, Absolute 0.01 10*3/mm3      Immature Grans, Absolute 0.01 10*3/mm3      nRBC 0.0 /100 WBC             Medication Review:    Current Facility-Administered Medications:     acetaminophen (TYLENOL) tablet 500 mg, 500 mg, Oral, Q6H, Guille Gutiérrez DO, 500 mg at 11/18/24 0308    sennosides-docusate (PERICOLACE) 8.6-50 MG per tablet 2 tablet, 2 tablet, Oral, BID, 2 tablet at 11/17/24 0004 **AND** polyethylene glycol (MIRALAX) packet 17 g, 17 g, Oral, Daily PRN **AND** bisacodyl (DULCOLAX) EC tablet 5 mg, 5 mg, Oral, Daily PRN **AND** bisacodyl (DULCOLAX) suppository 10 mg, 10 mg, Rectal, Daily PRN, Ton Harvey MD    Calcium Replacement - Follow Nurse / BPA Driven Protocol, , Not Applicable, PRN, Ton Harvey MD    cefTRIAXone (ROCEPHIN) 2,000 mg in sodium chloride 0.9 % 100 mL MBP, 2,000 mg, Intravenous, Q24H, Ton Harvey MD, Last Rate: 200 mL/hr at 11/17/24 2033, 2,000 mg at 11/17/24 2033    famotidine (PEPCID) tablet 40 mg, 40 mg, Oral, Daily, Ton Harvey MD, 40 mg at 11/17/24 0829    HYDROcodone-acetaminophen (NORCO) 7.5-325 MG per tablet 1 tablet, 1 tablet, Oral, Q6H PRN, Guille Gutiérrez DO, 1 tablet at 11/18/24 0403    HYDROmorphone (DILAUDID) injection 0.5 mg, 0.5 mg, Intravenous, Q2H PRN, 0.5 mg at 11/18/24 0445 **AND** naloxone (NARCAN) injection 0.4 mg, 0.4 mg, Intravenous, Q5 Min PRN, Ton Harvey MD    lactated ringers infusion, 9 mL/hr, Intravenous, Continuous,  Emmanuelle Jacobs CRNA, Last Rate: 9 mL/hr at 11/17/24 1138, 9 mL/hr at 11/17/24 1138    Lidocaine 4 % 1 patch, 1 patch, Transdermal, Q24H, Guille Gutiérrez, , 1 patch at 11/17/24 1421    Magnesium Standard Dose Replacement - Follow Nurse / BPA Driven Protocol, , Not Applicable, PRN, Ton Harvey MD    melatonin tablet 5 mg, 5 mg, Oral, Nightly PRN, Ton Harvey MD, 5 mg at 11/17/24 2159    nitroglycerin (NITROSTAT) SL tablet 0.4 mg, 0.4 mg, Sublingual, Q5 Min PRN, Ton Harvey MD    ondansetron ODT (ZOFRAN-ODT) disintegrating tablet 4 mg, 4 mg, Oral, Q6H PRN **OR** ondansetron (ZOFRAN) injection 4 mg, 4 mg, Intravenous, Q6H PRN, Ton Harvey MD    Phosphorus Replacement - Follow Nurse / BPA Driven Protocol, , Not Applicable, PRN, Ton Harvey MD    Potassium Replacement - Follow Nurse / BPA Driven Protocol, , Not Applicable, Wes LAZARO Stephen J., MD    Sodium Chloride (PF) 0.9 % 10 mL, 10 mL, Intravenous, PRN, Ton Harvey MD    sodium chloride 0.9 % flush 10 mL, 10 mL, Intravenous, Q12H, Ton Harvey MD, 10 mL at 11/17/24 2034    sodium chloride 0.9 % flush 10 mL, 10 mL, Intravenous, PRN, Ton Harvey MD    sodium chloride 0.9 % infusion 40 mL, 40 mL, Intravenous, PRN, Ton Harvey MD    tamsulosin (FLOMAX) 24 hr capsule 0.4 mg, 0.4 mg, Oral, Nightly, Ton Harvey MD, 0.4 mg at 11/17/24 2025    Assessment and Plan    39-year-old white female with a history of UTIs, pyelonephritis, and urolithiasis.  She was admitted at [facility] in 2020 for pyelonephritis.  She underwent stone extraction 5/2024 at .     She was admitted for obstructing left ureteral stone and possible UTI.  A CT scan with contrast 11/16/2024 at [facility] showed a 5 mm left proximal ureteral stone, bilateral tiny stones, and left lower pole cyst.  She started Rocephin and tamsulosin.  Urine and blood cultures are pending.  She underwent left ureteroscopic laser lithotripsy  2024.    She is doing well after left ureteroscopic laser lithotripsy yesterday.  She has good urine output.  She is afebrile and hemodynamically stable.    Plan: She may go home when deemed appropriate by the hospitalist.  She was instructed to remove the stent and strings in 2 days on Wednesday morning and may come to the office if needed.  She may follow-up with me in 3 to 4 weeks.      Renal stone    Acute flank pain    Hydronephrosis, left          Plan for disposition:Where: home and When:  today    Ton Harvey MD  24  07:28 EST    Electronically signed by Ton Harvey MD at 24 0732       Guille Gutiérrez DO at 24 1655              [facility]  PROGRESS NOTE    Patient Name: Marta Amaya  : 1985  MRN: [id number]    Date of Admission: 2024  Primary Care Physician: Paolo Bhandari MD    Subjective   Subjective     CC:  Left flank pain left upper quadrant pain    HPI:  Patient seen resting comfortably in bed no acute distress after urology procedure.  Patient continues to have mild to moderate left flank pain.  Tolerating p.o. intake.      Objective   Objective     Vital Signs:   Temp:  [97.8 °F (36.6 °C)-99.6 °F (37.6 °C)] 98.4 °F (36.9 °C)  Heart Rate:  [] 106  Resp:  [16-22] 18  BP: ()/(51-98) 113/68  Flow (L/min) (Oxygen Therapy):  [2-3] 2     Physical Exam  Constitutional:       General: She is not in acute distress.  Eyes:      General: No scleral icterus.     Extraocular Movements: Extraocular movements intact.   Cardiovascular:      Rate and Rhythm: Normal rate.      Pulses: Normal pulses.   Pulmonary:      Effort: Pulmonary effort is normal. No respiratory distress.   Abdominal:      General: There is no distension.      Palpations: Abdomen is soft.      Tenderness: There is no abdominal tenderness. There is no guarding or rebound.      Comments: Reports left flank pain   Musculoskeletal:      Right lower  leg: No edema.      Left lower leg: No edema.   Skin:     General: Skin is warm.   Neurological:      Mental Status: She is alert and oriented to person, place, and time.   Psychiatric:         Mood and Affect: Mood normal.         Behavior: Behavior normal.           Results Reviewed:  LAB RESULTS:      Lab 11/17/24  0902 11/16/24  1855   WBC 7.02 10.97*   HEMOGLOBIN 11.2* 13.5   HEMATOCRIT 34.7 40.5   PLATELETS 190 243   NEUTROS ABS 4.04 8.35*   IMMATURE GRANS (ABS) 0.01 0.01   LYMPHS ABS 2.15 1.76   MONOS ABS 0.59 0.67   EOS ABS 0.22 0.16   MCV 93.5 89.4   PROCALCITONIN  --  0.04   LACTATE  --  1.1         Lab 11/17/24  0902 11/16/24  1855   SODIUM 136 138   POTASSIUM 3.8 3.8   CHLORIDE 105 104   CO2 22.0 24.7   ANION GAP 9.0 9.3   BUN 12 11   CREATININE 0.60 0.69   EGFR 118.0 114.1   GLUCOSE 114* 98   CALCIUM 7.8* 9.0   MAGNESIUM 2.1  --          Lab 11/16/24 1855   TOTAL PROTEIN 7.0   ALBUMIN 4.1   GLOBULIN 2.9   ALT (SGPT) 21   AST (SGOT) 23   BILIRUBIN 0.4   ALK PHOS 94   LIPASE 25                     Brief Urine Lab Results  (Last result in the past 365 days)        Color   Clarity   Blood   Leuk Est   Nitrite   Protein   CREAT   Urine HCG        11/16/24 1855 Orange  Comment: Color of urine may affect dipstick results.    Slightly Cloudy   Small (1+)   Moderate (2+)   Positive   >=300 mg/dL (3+)                   Microbiology Results Abnormal       None            FL C Arm During Surgery    Result Date: 11/17/2024  This procedure was auto-finalized with no dictation required.    XR Chest 1 View    Result Date: 11/16/2024  XR CHEST 1 VW Date of Exam: 11/16/2024 11:09 PM EST Indication: pre op / leukcytosis Comparison: 1/16/2020. Findings: There are no airspace consolidations. No pleural fluid. No pneumothorax. The pulmonary vasculature appears within normal limits. The cardiac and mediastinal silhouette appear unremarkable. No acute osseous abnormality identified.     Impression: Impression: No acute  cardiopulmonary process. Electronically Signed: Angelita Garner MD  11/16/2024 11:40 PM EST  Workstation ID: [id number]    CT Abdomen Pelvis With Contrast    Result Date: 11/16/2024  CT ABDOMEN PELVIS W CONTRAST Date of Exam: 11/16/2024 7:38 PM EST Indication: Left flank pain. Comparison: CT of the abdomen and pelvis dated 11/14/2020 Technique: Axial CT images were obtained of the abdomen and pelvis following the uneventful intravenous administration of 90 mL Isovue 300. Reconstructed coronal and sagittal images were also obtained. Automated exposure control and iterative construction methods were used. Findings: Liver: The liver is unremarkable in morphology and decreased in attenuation. No focal liver lesion is seen. No biliary dilation is seen. Gallbladder: Unremarkable. Pancreas: Unremarkable. Spleen: Unremarkable. Adrenal glands: Unremarkable. Genitourinary tract: 5 mm obstructing calculus within the left proximal ureter causes mild to moderate left hydronephrosis. There are multiple punctate nonobstructing calculi within both kidneys, ranging in size from 1 to 3 mm. No hydronephrosis on the right. The visualized portion of the right ureter is unremarkable. Urinary bladder and pelvic organs demonstrate no acute abnormality. Gastrointestinal tract: The visualized hollow viscera demonstrate no focal lesion. There is no evidence of bowel obstruction. Appendix: The appendix is not identified. Other findings: No free air or free fluid is identified. No pathologically enlarged lymph nodes are seen. The abdominal aorta and IVC appear unremarkable. Bones and soft tissues: No acute or suspicious osseous or soft tissue lesion is identified. Lung bases: The visualized lung bases are clear.     Impression: Impression: 1.5 mm obstructing calculus within the left proximal ureter causes mild to moderate left hydronephrosis. 2.Additional bilateral punctate nonobstructive nephrolithiasis. 3.Hepatic steatosis. 4.Additional findings  as detailed above. Electronically Signed: Maldonado Decker MD  11/16/2024 8:16 PM EST  Workstation ID: [id number]         Current medications:  Scheduled Meds:acetaminophen, 500 mg, Oral, Q6H  cefTRIAXone, 2,000 mg, Intravenous, Q24H  famotidine, 40 mg, Oral, Daily  Lidocaine, 1 patch, Transdermal, Q24H  senna-docusate sodium, 2 tablet, Oral, BID  sodium chloride, 10 mL, Intravenous, Q12H  tamsulosin, 0.4 mg, Oral, Nightly      Continuous Infusions:lactated ringers, 9 mL/hr, Last Rate: 9 mL/hr (11/17/24 1138)      PRN Meds:.  senna-docusate sodium **AND** polyethylene glycol **AND** bisacodyl **AND** bisacodyl    Calcium Replacement - Follow Nurse / BPA Driven Protocol    HYDROcodone-acetaminophen    HYDROmorphone **AND** naloxone    Magnesium Standard Dose Replacement - Follow Nurse / BPA Driven Protocol    melatonin    nitroglycerin    ondansetron ODT **OR** ondansetron    Phosphorus Replacement - Follow Nurse / BPA Driven Protocol    Potassium Replacement - Follow Nurse / BPA Driven Protocol    Sodium Chloride (PF)    sodium chloride    sodium chloride    Assessment & Plan   Assessment & Plan     Active Hospital Problems    Diagnosis  POA    **Renal stone [N20.0]  Yes    Hydronephrosis, left [N13.30]  Yes    Acute flank pain [R10.9]  Yes      Resolved Hospital Problems   No resolved problems to display.        Brief Hospital Course to date:  Marta Amaya is a 39 y.o. female with past medical history of kidney stones, pyelonephritis status post right ureteral stent at [facility] on 5/10/2024 with removal on 5/20/2024 presented to [facility] with left flank pain and left upper quadrant pain.  CT abdomen pelvis revealing 5 mm obstructing calculus within the left proximal ureter causing mild to moderate left hydronephrosis.  Urology consulted, status post cystoscopy, left ureteroscopy, laser lithotripsy, basket stone extraction, ureteral stent placement on 11/17.  UA consistent with infection.  Currently on IV antibiotics  and adjusting pain control.    5 mm obstructing calculus within the left proximal ureter  Complicated UTI  Leukocytosis   -Patient with left flank pain and left upper quadrant pain that started on 11/16  - CT abdomen pelvis revealing 5 mm obstructing calculus within the left proximal ureter causing mild to moderate left hydronephrosis.   - Urology consulted, status post cystoscopy, left ureteroscopy, laser lithotripsy, basket stone extraction, ureteral stent placement on 11/17.    -hx of enterococcus faecalis urine cx (3/4/2017)   -Continue Rocephin  -Pain control, lidocaine patch, Norco PRN, dilaudid PRN  -Follow-up urine cultures  -Plan for stent and string removal in 3 days      Expected Discharge Location and Transportation: Home  Expected Discharge   Expected Discharge Date: 11/17/2024; Expected Discharge Time:      VTE Prophylaxis:  Mechanical VTE prophylaxis orders are present.         AM-PAC 6 Clicks Score (PT): 24 (11/17/24 0800)    CODE STATUS:   Code Status and Medical Interventions: CPR (Attempt to Resuscitate); Full Support   Ordered at: 11/16/24 1942     Code Status (Patient has no pulse and is not breathing):    CPR (Attempt to Resuscitate)     Medical Interventions (Patient has pulse or is breathing):    Full Support       Guille Gutiérrez DO  11/17/24        Electronically signed by Guille Gutiérrez DO at 11/17/24 1701          Consult Notes (all)        Ton Harvey MD at 11/17/24 0845           Consult    Patient Name: Marta MCCOY [facility]  Medical Record Number: [id number]  YOB: 1985    Date of consultation: 11/17/2024    Referring Provider:No ref. provider found Bekah CLAIRE and Shiva Marsh MD  Reason for Consultation: Left ureteral stone, possible UTI    Patient Care Team:  Paolo Bhandari MD as PCP - General (Family Medicine)    Chief complaint   Chief Complaint   Patient presents with    Flank Pain       Subjective .     History of present illness:    39-year-old white female  with a history of UTIs, pyelonephritis, and urolithiasis.  She was admitted at [facility] in  for pyelonephritis.  She underwent stone extraction 2024 at .    She was admitted for obstructing left ureteral stone and possible UTI.  She presented to [facility] with left renal colic, nausea, and chills.  A CT scan showed a 5 mm left proximal ureteral stone, bilateral tiny stones, and left lower pole cyst.  Labs included creatinine 0.69, WBC 10.97, lactate 1.1, procalcitonin 0.04, and urinalysis nitrite positive, WBC TNTC, RBC 0-2, bacteria 1+.  She started Rocephin and tamsulosin.    She still has left-sided abdominal and flank pain which is a little better with pain medication.  Her  is at the bedside.    Past Medical History:   Diagnosis Date    Cellulitis     Kidney stones     Obesity     Skin lesion of breast     URI (upper respiratory infection)      Past Surgical History:   Procedure Laterality Date    APPENDECTOMY      TONSILLECTOMY      TUBAL ABDOMINAL LIGATION       Family History   Problem Relation Age of Onset    No Known Problems Mother     Hypertension Father     Diabetes Other     Heart disease Other     Hypertension Other      Social History     Tobacco Use    Smoking status: Every Day     Current packs/day: 0.00     Types: Cigarettes     Start date:      Last attempt to quit: 2016     Years since quittin.8    Smokeless tobacco: Never   Vaping Use    Vaping status: Never Used   Substance Use Topics    Alcohol use: No    Drug use: No     Medications Prior to Admission   Medication Sig Dispense Refill Last Dose/Taking    acetaminophen (TYLENOL) 500 MG tablet Take 2 tablets by mouth Every 6 (Six) Hours As Needed for Mild Pain.   2024 Morning    amoxicillin (AMOXIL) 500 MG tablet TAKE 1 TABLET BY MOUTH THREE TIMES DAILY UNTIL GONE   Taking    ibuprofen (ADVIL,MOTRIN) 600 MG tablet Take 1 tablet by mouth Every 6 (Six) Hours As Needed.   Taking As Needed    phenazopyridine  "(PYRIDIUM) 200 MG tablet Take 1 tablet by mouth 3 (Three) Times a Day As Needed for Bladder Spasms. 9 tablet 0 Taking As Needed     Allergies:  Patient has no known allergies.    Review of Systems  The following systems were reviewed and negative;  respiratory, cardiovascular, musculoskeletal, neurological, and behavioral/psych    Objective     Vital Signs   BP 96/51 (BP Location: Right arm, Patient Position: Lying)   Pulse 78   Temp 98.4 °F (36.9 °C) (Axillary)   Resp 18   Ht 162.6 cm (64\")   Wt 112 kg (247 lb 9.6 oz)   SpO2 96%   BMI 42.50 kg/m²     Physical Exam:  General Appearance: No acute distress, sitting up in bed, pleasant, appears uncomfortable, obese  Lungs: Respirations regular, even and  unlabored  Heart: Regular rhythm and normal rate  Neurologic: Neurologically grossly intact    Results Review:  Lab Results (last 24 hours)       Procedure Component Value Units Date/Time    Procalcitonin [[id number]]  (Normal) Collected: 11/16/24 1855    Specimen: Blood Updated: 11/16/24 2054     Procalcitonin 0.04 ng/mL     Lactic Acid, Plasma [[id number]]  (Normal) Collected: 11/16/24 1855    Specimen: Blood Updated: 11/16/24 2046     Lactate 1.1 mmol/L     Urine Culture - Urine, Urine, Clean Catch [[id number]] Collected: 11/16/24 1855    Specimen: Urine, Clean Catch Updated: 11/16/24 2032    hCG, Quantitative, Pregnancy [[id number]] Collected: 11/16/24 1855    Specimen: Blood Updated: 11/16/24 1931     HCG Quantitative <0.20 mIU/mL     Narrative:      HCG Ranges by Gestational Age    Females - non-pregnant premenopausal   </= 1mIU/mL HCG  Females - postmenopausal               </= 7mIU/mL HCG    3 Weeks         5.8 -    71.2 mIU/mL  4 Weeks         9.5 -     750 mIU/mL  5 Weeks         217 -   7,138 mIU/mL  6 Weeks         158 -  31,795 mIU/mL  7 Weeks       3,697 - 163,563 mIU/mL  8 Weeks      32,065 - 149,571 mIU/mL  9 Weeks      63,803 - 151,410 mIU/mL  10 Weeks     46,509 - 186,977 mIU/mL  12 Weeks     " 27,832 - 210,612 mIU/mL  14 Weeks     13,950 -  62,530 mIU/mL  15 Weeks     12,039 -  70,971 mIU/mL  16 Weeks      9,040 -  56,451 mIU/mL  17 Weeks      8,175 -  55,868 mIU/mL  18 Weeks      8,099 -  58,176 mIU/mL  Results may be falsely decreased if patient taking Biotin.      Comprehensive Metabolic Panel [[id number]] Collected: 11/16/24 1855    Specimen: Blood Updated: 11/16/24 1923     Glucose 98 mg/dL      BUN 11 mg/dL      Creatinine 0.69 mg/dL      Sodium 138 mmol/L      Potassium 3.8 mmol/L      Chloride 104 mmol/L      CO2 24.7 mmol/L      Calcium 9.0 mg/dL      Total Protein 7.0 g/dL      Albumin 4.1 g/dL      ALT (SGPT) 21 U/L      AST (SGOT) 23 U/L      Alkaline Phosphatase 94 U/L      Total Bilirubin 0.4 mg/dL      Globulin 2.9 gm/dL      A/G Ratio 1.4 g/dL      BUN/Creatinine Ratio 15.9     Anion Gap 9.3 mmol/L      eGFR 114.1 mL/min/1.73     Narrative:      GFR Normal >60  Chronic Kidney Disease <60  Kidney Failure <15      Urinalysis, Microscopic Only - Urine, Clean Catch [[id number]]  (Abnormal) Collected: 11/16/24 1855    Specimen: Urine, Clean Catch Updated: 11/16/24 1923     RBC, UA 0-2 /HPF      WBC, UA Too Numerous to Count /HPF      Bacteria, UA 1+ /HPF      Squamous Epithelial Cells, UA 3-6 /HPF      Hyaline Casts, UA None Seen /LPF      WBC Clumps, UA Small/1+ /HPF      Methodology Manual Light Microscopy    Lipase [[id number]]  (Normal) Collected: 11/16/24 1855    Specimen: Blood Updated: 11/16/24 1923     Lipase 25 U/L     [facility]y Draw [[id number]] Collected: 11/16/24 1855    Specimen: Blood Updated: 11/16/24 1916    Narrative:      The following orders were created for panel order [facility] Draw.  Procedure                               Abnormality         Status                     ---------                               -----------         ------                     Green Top (Gel)[[id number]]                                  Final result               Lavender Top[[id number]]                                      Final result               Gold Top - SST[[id number]]                                   Final result               Carr Top[[id number]]                                         Final result               Light Blue Top[[id number]]                                   Final result                 Please view results for these tests on the individual orders.    Green Top (Gel) [[id number]] Collected: 11/16/24 1855    Specimen: Blood Updated: 11/16/24 1916     Extra Tube Hold for add-ons.     Comment: Auto resulted.       Lavender Top [[id number]] Collected: 11/16/24 1855    Specimen: Blood Updated: 11/16/24 1916     Extra Tube hold for add-on     Comment: Auto resulted       Gold Top - SST [[id number]] Collected: 11/16/24 1855    Specimen: Blood Updated: 11/16/24 1916     Extra Tube Hold for add-ons.     Comment: Auto resulted.       Gray Top [[id number]] Collected: 11/16/24 1855    Specimen: Blood Updated: 11/16/24 1916     Extra Tube Hold for add-ons.     Comment: Auto resulted.       Light Blue Top [[id number]] Collected: 11/16/24 1855    Specimen: Blood Updated: 11/16/24 1916     Extra Tube Hold for add-ons.     Comment: Auto resulted       Urinalysis With Microscopic If Indicated (No Culture) - Urine, Clean Catch [[id number]]  (Abnormal) Collected: 11/16/24 1855    Specimen: Urine, Clean Catch Updated: 11/16/24 1909     Color, UA Orange     Comment: Color of urine may affect dipstick results.         Appearance, UA Slightly Cloudy     pH, UA 5.5     Specific Gravity, UA 1.015     Glucose,  mg/dL (1+)     Ketones, UA Trace     Bilirubin, UA Negative     Blood, UA Small (1+)     Protein, UA >=300 mg/dL (3+)     Leuk Esterase, UA Moderate (2+)     Nitrite, UA Positive     Urobilinogen, UA >=8.0 E.U./dL    CBC & Differential [[id number]]  (Abnormal) Collected: 11/16/24 1855    Specimen: Blood Updated: 11/16/24 1905    Narrative:      The following orders were created for panel order CBC &  Differential.  Procedure                               Abnormality         Status                     ---------                               -----------         ------                     CBC Auto Differential[[id number]]        Abnormal            Final result                 Please view results for these tests on the individual orders.    CBC Auto Differential [[id number]]  (Abnormal) Collected: 11/16/24 1855    Specimen: Blood Updated: 11/16/24 1905     WBC 10.97 10*3/mm3      RBC 4.53 10*6/mm3      Hemoglobin 13.5 g/dL      Hematocrit 40.5 %      MCV 89.4 fL      MCH 29.8 pg      MCHC 33.3 g/dL      RDW 12.9 %      RDW-SD 42.5 fl      MPV 10.4 fL      Platelets 243 10*3/mm3      Neutrophil % 76.1 %      Lymphocyte % 16.0 %      Monocyte % 6.1 %      Eosinophil % 1.5 %      Basophil % 0.2 %      Immature Grans % 0.1 %      Neutrophils, Absolute 8.35 10*3/mm3      Lymphocytes, Absolute 1.76 10*3/mm3      Monocytes, Absolute 0.67 10*3/mm3      Eosinophils, Absolute 0.16 10*3/mm3      Basophils, Absolute 0.02 10*3/mm3      Immature Grans, Absolute 0.01 10*3/mm3           Imaging Results (Last 72 Hours)       Procedure Component Value Units Date/Time    XR Chest 1 View [[id number]] Collected: 11/16/24 2339     Updated: 11/16/24 2343    Narrative:      XR CHEST 1 VW    Date of Exam: 11/16/2024 11:09 PM EST    Indication: pre op / leukcytosis    Comparison: 1/16/2020.    Findings:  There are no airspace consolidations. No pleural fluid. No pneumothorax. The pulmonary vasculature appears within normal limits. The cardiac and mediastinal silhouette appear unremarkable. No acute osseous abnormality identified.      Impression:      Impression:  No acute cardiopulmonary process.      Electronically Signed: Angelita Garner MD    11/16/2024 11:40 PM EST    Workstation ID: [id number]    CT Abdomen Pelvis With Contrast [[id number]] Collected: 11/16/24 2012     Updated: 11/16/24 2020    Narrative:      CT ABDOMEN PELVIS W  CONTRAST    Date of Exam: 11/16/2024 7:38 PM EST    Indication: Left flank pain.    Comparison: CT of the abdomen and pelvis dated 11/14/2020    Technique: Axial CT images were obtained of the abdomen and pelvis following the uneventful intravenous administration of 90 mL Isovue 300. Reconstructed coronal and sagittal images were also obtained. Automated exposure control and iterative   construction methods were used.  Findings:    Liver: The liver is unremarkable in morphology and decreased in attenuation. No focal liver lesion is seen. No biliary dilation is seen.    Gallbladder: Unremarkable.    Pancreas: Unremarkable.    Spleen: Unremarkable.    Adrenal glands: Unremarkable.    Genitourinary tract: 5 mm obstructing calculus within the left proximal ureter causes mild to moderate left hydronephrosis. There are multiple punctate nonobstructing calculi within both kidneys, ranging in size from 1 to 3 mm. No hydronephrosis on the   right. The visualized portion of the right ureter is unremarkable. Urinary bladder and pelvic organs demonstrate no acute abnormality.    Gastrointestinal tract: The visualized hollow viscera demonstrate no focal lesion. There is no evidence of bowel obstruction.    Appendix: The appendix is not identified.    Other findings: No free air or free fluid is identified. No pathologically enlarged lymph nodes are seen. The abdominal aorta and IVC appear unremarkable.    Bones and soft tissues: No acute or suspicious osseous or soft tissue lesion is identified.    Lung bases: The visualized lung bases are clear.      Impression:      Impression:  1.5 mm obstructing calculus within the left proximal ureter causes mild to moderate left hydronephrosis.  2.Additional bilateral punctate nonobstructive nephrolithiasis.  3.Hepatic steatosis.  4.Additional findings as detailed above.      Electronically Signed: Maldonado Decker MD    11/16/2024 8:16 PM EST    Workstation ID: [id number]             I  reviewed the patient's new clinical results.  I reviewed the patient's new imaging results and agree with the interpretation.      Assessment and Recommendations  39-year-old white female with a history of UTIs, pyelonephritis, and urolithiasis.  She was admitted at [facility] in 2020 for pyelonephritis.  She underwent stone extraction 5/2024 at .    She was admitted for obstructing left ureteral stone and possible UTI.  A CT scan with contrast 11/16/2024 at [facility] showed a 5 mm left proximal ureteral stone, bilateral tiny stones, and left lower pole cyst.  Labs included creatinine 0.69, WBC 10.97, lactate 1.1, procalcitonin 0.04, and urinalysis nitrite positive, WBC TNTC, RBC 0-2, bacteria 1+.  She started Rocephin and tamsulosin.    We discussed management of the left ureteral stone including trial of passage, ureteroscopic laser lithotripsy, or ESWL.  We agreed to proceed with left ureteroscopic laser lithotripsy today.  If there is purulent drainage, I would place a stent and defer laser lithotripsy for a later date.  We discussed the operative technique and postoperative expectations and care.  Potential risks include bleeding, infection, injury to the urethra, bladder, ureter, kidney, and anesthetic risks.  Their questions were answered.  She would like to proceed.    Plan: Continue hydration and pain control.  Continue broad-spectrum antibiotic pending cultures, currently Rocephin.  To the OR today for cystoscopy, left ureteroscopy, laser lithotripsy, and stent placement.      Renal stone    Acute flank pain    Hydronephrosis, left      I discussed the patients findings and my recommendations with patient and family    Ton Harvey MD  11/17/24  08:46 EST      Electronically signed by Ton Harvey MD at 11/17/24 7037

## 2024-11-20 ENCOUNTER — NURSE TRIAGE (OUTPATIENT)
Dept: CALL CENTER | Facility: HOSPITAL | Age: 39
End: 2024-11-20
Payer: COMMERCIAL

## 2024-11-20 ENCOUNTER — TRANSITIONAL CARE MANAGEMENT TELEPHONE ENCOUNTER (OUTPATIENT)
Dept: CALL CENTER | Facility: HOSPITAL | Age: 39
End: 2024-11-20
Payer: COMMERCIAL

## 2024-11-20 LAB — BACTERIA SPEC AEROBE CULT: NO GROWTH

## 2024-11-20 NOTE — OUTREACH NOTE
Call Center TCM Note      Flowsheet Row Responses   [facility] patient discharged from? [place]   Does the patient have one of the following disease processes/diagnoses(primary or secondary)? Other   TCM attempt successful? No   Unsuccessful attempts Attempt 1            Yazmin Ferreira RN    11/20/2024, 13:47 EST

## 2024-11-20 NOTE — OUTREACH NOTE
Call Center TCM Note      Flowsheet Row Responses   [facility] patient discharged from? [place]   Does the patient have one of the following disease processes/diagnoses(primary or secondary)? Other   TCM attempt successful? No   Unsuccessful attempts Attempt 2            Yazmin Ferreira RN    11/20/2024, 14:59 EST

## 2024-11-21 ENCOUNTER — TRANSITIONAL CARE MANAGEMENT TELEPHONE ENCOUNTER (OUTPATIENT)
Dept: CALL CENTER | Facility: HOSPITAL | Age: 39
End: 2024-11-21
Payer: COMMERCIAL

## 2024-11-21 NOTE — DISCHARGE SUMMARY
[facility] Medicine Services  DISCHARGE SUMMARY    Patient Name: Marta Amaya  : 1985  MRN: [id number]    Date of Admission: 2024  6:32 PM  Date of Discharge:  2024  Primary Care Physician: Paolo Bhandari MD    Consults       No orders found from 10/18/2024 to 2024.            Hospital Course     Presenting Problem: left flank pain     Active Hospital Problems    Diagnosis  POA    **Renal stone [N20.0]  Yes    Hydronephrosis, left [N13.30]  Yes    Acute flank pain [R10.9]  Yes      Resolved Hospital Problems   No resolved problems to display.          Hospital Course:  Marta Amaya is a 39 y.o. female with PMHx of kidney stones, pyelonephritis with R ureteral stent at [facility] 5/10/2024 (subsequently removed ) who presented to [place] ED for evaluation of L flank pain / LUQ pain and lowgrade temps.      5 mm obstructing calculus within the left proximal ureter  Leukocytosis   -Patient with left flank pain and left upper quadrant pain that started on   - CT abdomen pelvis revealing 5 mm obstructing calculus within the left proximal ureter causing mild to moderate left hydronephrosis.   - Urology consulted.  On  Dr Harvey did cystoscopy, left ureteroscopy, laser lithotripsy, basket stone extraction, ureteral stent placement.    - she got ceftriaxone x 3 doses.  Urine culture was no growth.   - She had significant ongoing L flank pain with superimposed spasms, and stayed in house for management.  - Valium and toradol were most successful in managing her pain.      Discharge Follow Up Recommendations for outpatient labs/diagnostics:   - FU with Dr Harvey in a week if still having symptoms    - Stent to be removed on  at home     Day of Discharge     HPI:   better with valium and toradol but still not comfortable.  On balance, she prefers to be discharged.  Eating without difficulty.  No fevers.     Review of Systems  No dysuria     Vital Signs:           Physical Exam:  Gen:  WD/WN  Neuro: alert and oriented, clear speech, follows commands, grossly nonfocal  HEENT:  NC/AT PERRL, OP benign  Neck:  Supple, no LAD  Heart RRR no murmur, rub, or gallop  Abd:  Soft, nontender, no rebound   Back - tender spot at L flank/CVA area, but sensitive to even light palpation  Extrem:  No c/c/e    Pertinent  and/or Most Recent Results     LAB RESULTS:      Lab 11/17/24  0902 11/16/24  1855   WBC 7.02 10.97*   HEMOGLOBIN 11.2* 13.5   HEMATOCRIT 34.7 40.5   PLATELETS 190 243   NEUTROS ABS 4.04 8.35*   IMMATURE GRANS (ABS) 0.01 0.01   LYMPHS ABS 2.15 1.76   MONOS ABS 0.59 0.67   EOS ABS 0.22 0.16   MCV 93.5 89.4   PROCALCITONIN  --  0.04   LACTATE  --  1.1         Lab 11/18/24  0955 11/17/24  0902 11/16/24  1855   SODIUM 139 136 138   POTASSIUM 4.1 3.8 3.8   CHLORIDE 105 105 104   CO2 23.0 22.0 24.7   ANION GAP 11.0 9.0 9.3   BUN 12 12 11   CREATININE 0.72 0.60 0.69   EGFR 109.9 118.0 114.1   GLUCOSE 144* 114* 98   CALCIUM 8.1* 7.8* 9.0   MAGNESIUM  --  2.1  --          Lab 11/16/24  1855   TOTAL PROTEIN 7.0   ALBUMIN 4.1   GLOBULIN 2.9   ALT (SGPT) 21   AST (SGOT) 23   BILIRUBIN 0.4   ALK PHOS 94   LIPASE 25                     Brief Urine Lab Results  (Last result in the past 365 days)        Color   Clarity   Blood   Leuk Est   Nitrite   Protein   CREAT   Urine HCG        11/19/24 0953 Red   Turbid   Large (3+)   Moderate (2+)   Negative   >=300 mg/dL (3+)                 Microbiology Results (last 10 days)       Procedure Component Value - Date/Time    Urine Culture - Urine, Urine, Clean Catch [[id number]]  (Normal) Collected: 11/19/24 0953    Lab Status: Final result Specimen: Urine, Clean Catch Updated: 11/20/24 1154     Urine Culture No growth    Blood Culture - Blood, Hand, Left [[id number]]  (Normal) Collected: 11/16/24 2045    Lab Status: Preliminary result Specimen: Blood from Hand, Left Updated: 11/20/24 2146     Blood Culture No growth at 3 days    Blood Culture -  Blood, Arm, Right [[id number]]  (Normal) Collected: 11/16/24 2030    Lab Status: Preliminary result Specimen: Blood from Arm, Right Updated: 11/20/24 2146     Blood Culture No growth at 3 days    Urine Culture - Urine, Urine, Clean Catch [[id number]]  (Normal) Collected: 11/16/24 1855    Lab Status: Final result Specimen: Urine, Clean Catch Updated: 11/19/24 0148     Urine Culture No growth            FL C Arm During Surgery    Result Date: 11/17/2024  This procedure was auto-finalized with no dictation required.    XR Chest 1 View    Result Date: 11/16/2024  XR CHEST 1 VW Date of Exam: 11/16/2024 11:09 PM EST Indication: pre op / leukcytosis Comparison: 1/16/2020. Findings: There are no airspace consolidations. No pleural fluid. No pneumothorax. The pulmonary vasculature appears within normal limits. The cardiac and mediastinal silhouette appear unremarkable. No acute osseous abnormality identified.     Impression: No acute cardiopulmonary process. Electronically Signed: Angelita Garner MD  11/16/2024 11:40 PM EST  Workstation ID: [id number]    CT Abdomen Pelvis With Contrast    Result Date: 11/16/2024  CT ABDOMEN PELVIS W CONTRAST Date of Exam: 11/16/2024 7:38 PM EST Indication: Left flank pain. Comparison: CT of the abdomen and pelvis dated 11/14/2020 Technique: Axial CT images were obtained of the abdomen and pelvis following the uneventful intravenous administration of 90 mL Isovue 300. Reconstructed coronal and sagittal images were also obtained. Automated exposure control and iterative construction methods were used. Findings: Liver: The liver is unremarkable in morphology and decreased in attenuation. No focal liver lesion is seen. No biliary dilation is seen. Gallbladder: Unremarkable. Pancreas: Unremarkable. Spleen: Unremarkable. Adrenal glands: Unremarkable. Genitourinary tract: 5 mm obstructing calculus within the left proximal ureter causes mild to moderate left hydronephrosis. There are multiple punctate  nonobstructing calculi within both kidneys, ranging in size from 1 to 3 mm. No hydronephrosis on the right. The visualized portion of the right ureter is unremarkable. Urinary bladder and pelvic organs demonstrate no acute abnormality. Gastrointestinal tract: The visualized hollow viscera demonstrate no focal lesion. There is no evidence of bowel obstruction. Appendix: The appendix is not identified. Other findings: No free air or free fluid is identified. No pathologically enlarged lymph nodes are seen. The abdominal aorta and IVC appear unremarkable. Bones and soft tissues: No acute or suspicious osseous or soft tissue lesion is identified. Lung bases: The visualized lung bases are clear.     Impression: 1.5 mm obstructing calculus within the left proximal ureter causes mild to moderate left hydronephrosis. 2.Additional bilateral punctate nonobstructive nephrolithiasis. 3.Hepatic steatosis. 4.Additional findings as detailed above. Electronically Signed: Maldonado Decker MD  11/16/2024 8:16 PM EST  Workstation ID: [id number]                 Plan for Follow-up of Pending Labs/Results: will follow up   Pending Labs       Order Current Status    STONE ANALYSIS - Calculus, Ureter, Left In process    Blood Culture - Blood, Arm, Right Preliminary result    Blood Culture - Blood, Hand, Left Preliminary result          Discharge Details        Discharge Medications        New Medications        Instructions Start Date   diazePAM 5 MG tablet  Commonly known as: VALIUM   5 mg, Oral, Every 6 Hours PRN      HYDROcodone-acetaminophen 7.5-325 MG per tablet  Commonly known as: NORCO   1 tablet, Oral, Every 6 Hours PRN      tamsulosin 0.4 MG capsule 24 hr capsule  Commonly known as: FLOMAX   0.4 mg, Oral, Nightly             Continue These Medications        Instructions Start Date   acetaminophen 500 MG tablet  Commonly known as: TYLENOL   1,000 mg, Every 6 Hours PRN      ibuprofen 600 MG tablet  Commonly known as: ADVIL,MOTRIN    600 mg, Every 6 Hours PRN      phenazopyridine 200 MG tablet  Commonly known as: PYRIDIUM   200 mg, Oral, 3 Times Daily PRN             Stop These Medications      amoxicillin 500 MG tablet  Commonly known as: AMOXIL              No Known Allergies      Discharge Disposition:  Home or Self Care    Diet:  Hospital:No active diet order                CODE STATUS:    Code Status and Medical Interventions: CPR (Attempt to Resuscitate); Full Support   Ordered at: 11/16/24 2258     Code Status (Patient has no pulse and is not breathing):    CPR (Attempt to Resuscitate)     Medical Interventions (Patient has pulse or is breathing):    Full Support       No future appointments.    Additional Instructions for the Follow-ups that You Need to Schedule       Discharge Follow-up with Specified Provider: Dr rene; 1 Week   As directed      To: Dr rene   Follow Up: 1 Week   Follow Up Details: recent lithotripsy w persistent pain after procedure                      Radha Bhandari MD  11/21/24      Time Spent on Discharge:  I spent  35  minutes on this discharge activity which included: face-to-face encounter with the patient, reviewing the data in the system, coordination of the care with the nursing staff as well as consultants, documentation, and entering orders.

## 2024-11-21 NOTE — TELEPHONE ENCOUNTER
Reason for Disposition   [1] MILD-MODERATE post-op pain (e.g., pain scale 1-7) AND [2] not controlled with pain medications    Additional Information   Negative: Sounds like a life-threatening emergency to the triager   Negative: Chest pain   Negative: Difficulty breathing   Negative: Acting confused (e.g., disoriented, slurred speech) or excessively sleepy   Negative: Post-Op tonsil and adenoid surgery, symptoms or questions about   Negative: Surgical incision symptoms and questions   Negative: [1] Pain or burning with passing urine (urination) AND [2] male   Negative: [1] Pain or burning with passing urine (urination) AND [2] female   Negative: Constipation   Negative: New or worsening leg (calf, thigh) pain   Negative: New or worsening leg swelling   Negative: Dizziness is severe, or persists > 24 hours after surgery   Negative: Pain, redness, swelling, or pus at IV Site   Negative: Symptoms arising from use of a urinary catheter (e.g., Coude, Urena)   Negative: Cast problems or questions   Negative: Medication question   Negative: [1] Widespread rash AND [2] bright red, sunburn-like   Negative: [1] SEVERE headache AND [2] after spinal (epidural) anesthesia   Negative: [1] Vomiting AND [2] persists > 4 hours   Negative: [1] Vomiting AND [2] abdomen looks much more swollen than usual   Negative: [1] Drinking very little AND [2] dehydration suspected (e.g., no urine > 12 hours, very dry mouth, very lightheaded)   Negative: Patient sounds very sick or weak to the triager   Negative: Sounds like a serious complication to the triager   Negative: Fever > 100.4 F (38.0 C)   Negative: [1] SEVERE post-op pain (e.g., excruciating, pain scale 8-10) AND [2] not controlled with pain medications   Negative: [1] Caller has URGENT question AND [2] triager unable to answer question   Negative: [1] Headache AND [2] after spinal (epidural) anesthesia AND [3] not severe   Negative: Fever present > 3 days (72 hours)    Answer  "Assessment - Initial Assessment Questions  1. SYMPTOM: \"What's the main symptom you're concerned about?\" (e.g., pain, fever, vomiting)      Pain  2. ONSET: \"When did pain  start?\"      Since discharge from inpatient  3. SURGERY: \"What surgery did you have?\"      Lithotripsy  4. DATE of SURGERY: \"When was the surgery?\"       11/18.24  5. ANESTHESIA: \" What type of anesthesia did you have?\" (e.g., general, spinal, epidural, local)      general  6. PAIN: \"Is there any pain?\" If Yes, ask: \"How bad is it?\"  (Scale 1-10; or mild, moderate, severe)      Yes - 10  7. FEVER: \"Do you have a fever?\" If Yes, ask: \"What is your temperature, how was it measured, and when did it start?\"      denies  8. VOMITING: \"Is there any vomiting?\" If Yes, ask: \"How many times?\"      denies  9. BLEEDING: \"Is there any bleeding?\" If Yes, ask: \"How much?\" and \"Where?\"      denies  10. OTHER SYMPTOMS: \"Do you have any other symptoms?\" (e.g., drainage from wound, painful urination, constipation)        denies    Protocols used: Post-Op Symptoms and Questions-ADULT-    "

## 2024-11-21 NOTE — OUTREACH NOTE
Call Center TCM Note      Flowsheet Row Responses   [facility] patient discharged from? [place]   Does the patient have one of the following disease processes/diagnoses(primary or secondary)? Other   TCM attempt successful? Yes   Call start time 1616   Call end time 1627   Discharge diagnosis Cystoscopy ureteroscopy with lasewr and stent placement   Person spoke with today (if not patient) and relationship pt   Meds reviewed with patient/caregiver? Yes   Is the patient having any side effects they believe may be caused by any medication additions or changes? No   Does the patient have all medications ordered at discharge? Yes   Is the patient taking all medications as directed (includes completed medication regime)? Yes   Comments Urology told pt to fu with PCP   Does the patient have an appointment with their PCP within 7-14 days of discharge? Yes  [11/27/2024 12:00 PM]   Psychosocial issues? No   Did the patient receive a copy of their discharge instructions? Yes   What is the patient's perception of their health status since discharge? Improving   Is the patient/caregiver able to teach back signs and symptoms related to disease process for when to call PCP? Yes   Is the patient/caregiver able to teach back signs and symptoms related to disease process for when to call 911? Yes   Is the patient/caregiver able to teach back the hierarchy of who to call/visit for symptoms/problems? PCP, Specialist, Home health nurse, Urgent Care, ED, 911 Yes   If the patient is a current smoker, are they able to teach back resources for cessation? Not a smoker   TCM call completed? Yes   Wrap up additional comments Pt is still having a lot of pain, and pt shares urology office had pt pull her own stent out. Pt shares there was some blood, and had some blood later that night. Pt is having a lot of pain since lithotripsy. Pt has Norco but still having a lot of pain,  pt advised to call PCP to inquire about pyridium/neurontin  for pain management. Pt verified PCP fu appt   Call end time 9540   Would this patient benefit from a Referral to [facility] Social Work? No   Is the patient interested in additional calls from an ambulatory ? No            Tennille Martino RN    11/21/2024, 16:31 EST

## 2024-11-22 LAB
BACTERIA SPEC AEROBE CULT: NORMAL
BACTERIA SPEC AEROBE CULT: NORMAL

## 2024-11-23 LAB
CALCIUM OXALATE DIHYDRATE MFR STONE IR: 40 %
COLOR STONE: NORMAL
COM MFR STONE: 55 %
COMPN STONE: NORMAL
HYDROXYAPATITE: 5 %
LABORATORY COMMENT REPORT: NORMAL
LABORATORY COMMENT REPORT: NORMAL
Lab: NORMAL
Lab: NORMAL
PHOTO: NORMAL
SIZE STONE: NORMAL MM
SPEC SOURCE SUBJ: NORMAL
STONE ANALYSIS-IMP: NORMAL
WT STONE: 45 MG

## 2024-11-27 ENCOUNTER — OFFICE VISIT (OUTPATIENT)
Dept: FAMILY MEDICINE CLINIC | Facility: CLINIC | Age: 39
End: 2024-11-27
Payer: COMMERCIAL

## 2024-11-27 VITALS
RESPIRATION RATE: 16 BRPM | BODY MASS INDEX: 40.97 KG/M2 | HEART RATE: 103 BPM | TEMPERATURE: 98.1 F | DIASTOLIC BLOOD PRESSURE: 80 MMHG | OXYGEN SATURATION: 94 % | HEIGHT: 64 IN | SYSTOLIC BLOOD PRESSURE: 122 MMHG | WEIGHT: 240 LBS

## 2024-11-27 DIAGNOSIS — R73.9 HYPERGLYCEMIA: ICD-10-CM

## 2024-11-27 DIAGNOSIS — Z09 HOSPITAL DISCHARGE FOLLOW-UP: Primary | ICD-10-CM

## 2024-11-27 DIAGNOSIS — R05.1 ACUTE COUGH: ICD-10-CM

## 2024-11-27 DIAGNOSIS — N28.9 ACUTE RENAL INSUFFICIENCY: ICD-10-CM

## 2024-11-27 DIAGNOSIS — N20.0 KIDNEY STONES: ICD-10-CM

## 2024-11-27 LAB
EXPIRATION DATE: NORMAL
FLUAV AG UPPER RESP QL IA.RAPID: NOT DETECTED
FLUBV AG UPPER RESP QL IA.RAPID: NOT DETECTED
INTERNAL CONTROL: NORMAL
Lab: NORMAL
SARS-COV-2 AG UPPER RESP QL IA.RAPID: NOT DETECTED

## 2024-11-27 RX ORDER — AZITHROMYCIN 250 MG/1
TABLET, FILM COATED ORAL
Qty: 6 TABLET | Refills: 0 | Status: SHIPPED | OUTPATIENT
Start: 2024-11-27

## 2024-11-27 RX ORDER — BROMPHENIRAMINE MALEATE, PSEUDOEPHEDRINE HYDROCHLORIDE, AND DEXTROMETHORPHAN HYDROBROMIDE 2; 30; 10 MG/5ML; MG/5ML; MG/5ML
5 SYRUP ORAL 4 TIMES DAILY PRN
Qty: 180 ML | Refills: 0 | Status: SHIPPED | OUTPATIENT
Start: 2024-11-27

## 2024-11-27 NOTE — PROGRESS NOTES
"Transitional Care Follow Up Visit  Subjective     Marta Amaya is a 39 y.o. female who presents for a transitional care management visit.    Within 48 business hours after discharge our office contacted her via telephone to coordinate her care and needs.      I reviewed and discussed the details of that call along with the discharge summary, hospital problems, inpatient lab results, inpatient diagnostic studies, and consultation reports with Marta.     Current outpatient and discharge medications have been reconciled for the patient.  Reviewed by: Paolo Bhandari MD          11/19/2024     6:53 PM   Date of TCM Phone Call   [facility]   Date of Admission 11/16/2024   Date of Discharge 11/19/2024   Discharge Disposition Home or Self Care     Risk for Readmission (LACE) Score: 3 (11/19/2024  6:00 AM)      History of Present Illness   Course During Hospital Stay:      She had more renal stones recently  Had stent placed  She had to pull her own stent and this was painful  Intense left flank pain as well    She has been doing ok at home  Has had ongoing flank pain  Feling good today!  No fevers, no N/V  Eating well      Has had more congestion the past few days  Coughing  More mucous     The following portions of the patient's history were reviewed and updated as appropriate: allergies, current medications, past family history, past medical history, past social history, past surgical history, and problem list.    Review of Systems   Constitutional: Negative.  Negative for fever.   Respiratory: Negative.     Gastrointestinal:  Negative for abdominal pain, nausea and vomiting.   Psychiatric/Behavioral: Negative.         Objective   /80   Pulse 103   Temp 98.1 °F (36.7 °C)   Resp 16   Ht 162.6 cm (64\")   Wt 109 kg (240 lb)   SpO2 94%   BMI 41.20 kg/m²   Physical Exam  Vitals and nursing note reviewed.   Constitutional:       General: She is not in acute distress.     Appearance: Normal " appearance. She is well-developed.   HENT:      Head: Normocephalic and atraumatic.      Right Ear: Hearing and external ear normal.      Left Ear: Hearing and external ear normal.      Nose: Nose normal.      Mouth/Throat:      Pharynx: Uvula midline.   Eyes:      Conjunctiva/sclera: Conjunctivae normal.   Cardiovascular:      Rate and Rhythm: Normal rate and regular rhythm.      Heart sounds: Normal heart sounds.   Pulmonary:      Effort: Pulmonary effort is normal.      Breath sounds: Normal breath sounds.   Abdominal:      General: Abdomen is flat. Bowel sounds are normal. There is no distension.      Palpations: Abdomen is soft.      Tenderness: There is no abdominal tenderness. There is no guarding or rebound.   Musculoskeletal:      Cervical back: Normal range of motion.   Lymphadenopathy:      Cervical: No cervical adenopathy.   Neurological:      Mental Status: She is alert and oriented to person, place, and time.   Psychiatric:         Mood and Affect: Mood normal.         Behavior: Behavior normal.         Thought Content: Thought content normal.         Judgment: Judgment normal.         Assessment & Plan   Diagnoses and all orders for this visit:    1. Hospital discharge follow-up (Primary)  -     Hemoglobin A1c  -     CBC & Differential  -     Comprehensive Metabolic Panel    2. Kidney stones  -     CBC & Differential    3. Acute cough  -     POCT SARS-CoV-2 Antigen SEDA + Flu  -     CBC & Differential  -     brompheniramine-pseudoephedrine-DM 30-2-10 MG/5ML syrup; Take 5 mL by mouth 4 (Four) Times a Day As Needed for Cough or Congestion.  Dispense: 180 mL; Refill: 0  -     azithromycin (Zithromax) 250 MG tablet; Take 2 tablets the first day, then 1 tablet daily for 4 days.  Dispense: 6 tablet; Refill: 0    4. Hyperglycemia  -     Hemoglobin A1c  -     Comprehensive Metabolic Panel    5. Acute renal insufficiency  -     Comprehensive Metabolic Panel    Overall pt doing well with her kidney stones ans  has not had any recent pains.  Will recheck labs for kidney function and electrolyte abnomalities. Will check CBC to ensure return to normal    Bromfed for cough, consider zpac INB as this is holiday weekend.  Pt agrees    F/u pending labs work up to follow up form hospital stay

## 2024-11-28 LAB
ALBUMIN SERPL-MCNC: 4.3 G/DL (ref 3.9–4.9)
ALP SERPL-CCNC: 100 IU/L (ref 44–121)
ALT SERPL-CCNC: 50 IU/L (ref 0–32)
AST SERPL-CCNC: 28 IU/L (ref 0–40)
BASOPHILS # BLD AUTO: 0 X10E3/UL (ref 0–0.2)
BASOPHILS NFR BLD AUTO: 0 %
BILIRUB SERPL-MCNC: 0.3 MG/DL (ref 0–1.2)
BUN SERPL-MCNC: 12 MG/DL (ref 6–20)
BUN/CREAT SERPL: 15 (ref 9–23)
CALCIUM SERPL-MCNC: 9.1 MG/DL (ref 8.7–10.2)
CHLORIDE SERPL-SCNC: 102 MMOL/L (ref 96–106)
CO2 SERPL-SCNC: 24 MMOL/L (ref 20–29)
CREAT SERPL-MCNC: 0.79 MG/DL (ref 0.57–1)
EGFRCR SERPLBLD CKD-EPI 2021: 98 ML/MIN/1.73
EOSINOPHIL # BLD AUTO: 0.3 X10E3/UL (ref 0–0.4)
EOSINOPHIL NFR BLD AUTO: 3 %
ERYTHROCYTE [DISTWIDTH] IN BLOOD BY AUTOMATED COUNT: 12.6 % (ref 11.7–15.4)
GLOBULIN SER CALC-MCNC: 2.6 G/DL (ref 1.5–4.5)
GLUCOSE SERPL-MCNC: 132 MG/DL (ref 70–99)
HBA1C MFR BLD: 5.7 % (ref 4.8–5.6)
HCT VFR BLD AUTO: 43 % (ref 34–46.6)
HGB BLD-MCNC: 13.6 G/DL (ref 11.1–15.9)
IMM GRANULOCYTES # BLD AUTO: 0 X10E3/UL (ref 0–0.1)
IMM GRANULOCYTES NFR BLD AUTO: 0 %
LYMPHOCYTES # BLD AUTO: 2.2 X10E3/UL (ref 0.7–3.1)
LYMPHOCYTES NFR BLD AUTO: 28 %
MCH RBC QN AUTO: 29.5 PG (ref 26.6–33)
MCHC RBC AUTO-ENTMCNC: 31.6 G/DL (ref 31.5–35.7)
MCV RBC AUTO: 93 FL (ref 79–97)
MONOCYTES # BLD AUTO: 0.4 X10E3/UL (ref 0.1–0.9)
MONOCYTES NFR BLD AUTO: 6 %
NEUTROPHILS # BLD AUTO: 4.9 X10E3/UL (ref 1.4–7)
NEUTROPHILS NFR BLD AUTO: 63 %
PLATELET # BLD AUTO: 297 X10E3/UL (ref 150–450)
POTASSIUM SERPL-SCNC: 4.3 MMOL/L (ref 3.5–5.2)
PROT SERPL-MCNC: 6.9 G/DL (ref 6–8.5)
RBC # BLD AUTO: 4.61 X10E6/UL (ref 3.77–5.28)
SODIUM SERPL-SCNC: 139 MMOL/L (ref 134–144)
WBC # BLD AUTO: 7.8 X10E3/UL (ref 3.4–10.8)

## 2025-08-07 ENCOUNTER — APPOINTMENT (OUTPATIENT)
Dept: CT IMAGING | Facility: HOSPITAL | Age: 40
End: 2025-08-07
Payer: COMMERCIAL

## 2025-08-07 LAB
ALBUMIN SERPL-MCNC: 4.3 G/DL (ref 3.5–5.2)
ALBUMIN/GLOB SERPL: 1.5 G/DL
ALP SERPL-CCNC: 86 U/L (ref 39–117)
ALT SERPL W P-5'-P-CCNC: 45 U/L (ref 1–33)
ANION GAP SERPL CALCULATED.3IONS-SCNC: 12.1 MMOL/L (ref 5–15)
AST SERPL-CCNC: 26 U/L (ref 1–32)
BACTERIA UR QL AUTO: ABNORMAL /HPF
BASOPHILS # BLD AUTO: 0.03 10*3/MM3 (ref 0–0.2)
BASOPHILS NFR BLD AUTO: 0.4 % (ref 0–1.5)
BILIRUB SERPL-MCNC: 0.4 MG/DL (ref 0–1.2)
BILIRUB UR QL STRIP: NEGATIVE
BUN SERPL-MCNC: 14.2 MG/DL (ref 6–20)
BUN/CREAT SERPL: 19.7 (ref 7–25)
CALCIUM SPEC-SCNC: 9.3 MG/DL (ref 8.6–10.5)
CHLORIDE SERPL-SCNC: 105 MMOL/L (ref 98–107)
CLARITY UR: CLEAR
CO2 SERPL-SCNC: 24.9 MMOL/L (ref 22–29)
COLOR UR: ABNORMAL
CREAT SERPL-MCNC: 0.72 MG/DL (ref 0.57–1)
D-LACTATE SERPL-SCNC: 1.9 MMOL/L (ref 0.5–2)
DEPRECATED RDW RBC AUTO: 41.1 FL (ref 37–54)
EGFRCR SERPLBLD CKD-EPI 2021: 109.2 ML/MIN/1.73
EOSINOPHIL # BLD AUTO: 0.16 10*3/MM3 (ref 0–0.4)
EOSINOPHIL NFR BLD AUTO: 1.9 % (ref 0.3–6.2)
ERYTHROCYTE [DISTWIDTH] IN BLOOD BY AUTOMATED COUNT: 12.5 % (ref 12.3–15.4)
GLOBULIN UR ELPH-MCNC: 2.9 GM/DL
GLUCOSE SERPL-MCNC: 128 MG/DL (ref 65–99)
GLUCOSE UR STRIP-MCNC: NEGATIVE MG/DL
HCG INTACT+B SERPL-ACNC: <1 MIU/ML
HCT VFR BLD AUTO: 37.9 % (ref 34–46.6)
HGB BLD-MCNC: 12.9 G/DL (ref 12–15.9)
HGB UR QL STRIP.AUTO: ABNORMAL
HOLD SPECIMEN: NORMAL
HYALINE CASTS UR QL AUTO: ABNORMAL /LPF
IMM GRANULOCYTES # BLD AUTO: 0.03 10*3/MM3 (ref 0–0.05)
IMM GRANULOCYTES NFR BLD AUTO: 0.4 % (ref 0–0.5)
KETONES UR QL STRIP: ABNORMAL
LEUKOCYTE ESTERASE UR QL STRIP.AUTO: ABNORMAL
LIPASE SERPL-CCNC: 18 U/L (ref 13–60)
LYMPHOCYTES # BLD AUTO: 3.54 10*3/MM3 (ref 0.7–3.1)
LYMPHOCYTES NFR BLD AUTO: 42.4 % (ref 19.6–45.3)
MCH RBC QN AUTO: 30.7 PG (ref 26.6–33)
MCHC RBC AUTO-ENTMCNC: 34 G/DL (ref 31.5–35.7)
MCV RBC AUTO: 90.2 FL (ref 79–97)
MONOCYTES # BLD AUTO: 0.51 10*3/MM3 (ref 0.1–0.9)
MONOCYTES NFR BLD AUTO: 6.1 % (ref 5–12)
NEUTROPHILS NFR BLD AUTO: 4.07 10*3/MM3 (ref 1.7–7)
NEUTROPHILS NFR BLD AUTO: 48.8 % (ref 42.7–76)
NITRITE UR QL STRIP: NEGATIVE
NRBC BLD AUTO-RTO: 0 /100 WBC (ref 0–0.2)
PH UR STRIP.AUTO: 6 [PH] (ref 5–8)
PLATELET # BLD AUTO: 232 10*3/MM3 (ref 140–450)
PMV BLD AUTO: 10.2 FL (ref 6–12)
POTASSIUM SERPL-SCNC: 3.8 MMOL/L (ref 3.5–5.2)
PROT SERPL-MCNC: 7.2 G/DL (ref 6–8.5)
PROT UR QL STRIP: NEGATIVE
RBC # BLD AUTO: 4.2 10*6/MM3 (ref 3.77–5.28)
RBC # UR STRIP: ABNORMAL /HPF
REF LAB TEST METHOD: ABNORMAL
SODIUM SERPL-SCNC: 142 MMOL/L (ref 136–145)
SP GR UR STRIP: 1.02 (ref 1–1.03)
SQUAMOUS #/AREA URNS HPF: ABNORMAL /HPF
UROBILINOGEN UR QL STRIP: ABNORMAL
WBC # UR STRIP: ABNORMAL /HPF
WBC NRBC COR # BLD AUTO: 8.34 10*3/MM3 (ref 3.4–10.8)
WHOLE BLOOD HOLD COAG: NORMAL
WHOLE BLOOD HOLD SPECIMEN: NORMAL

## 2025-08-07 PROCEDURE — 25010000002 PROCHLORPERAZINE 10 MG/2ML SOLUTION: Performed by: EMERGENCY MEDICINE

## 2025-08-07 PROCEDURE — 96375 TX/PRO/DX INJ NEW DRUG ADDON: CPT

## 2025-08-07 PROCEDURE — 99284 EMERGENCY DEPT VISIT MOD MDM: CPT

## 2025-08-07 PROCEDURE — 81001 URINALYSIS AUTO W/SCOPE: CPT | Performed by: EMERGENCY MEDICINE

## 2025-08-07 PROCEDURE — 84702 CHORIONIC GONADOTROPIN TEST: CPT | Performed by: EMERGENCY MEDICINE

## 2025-08-07 PROCEDURE — 83690 ASSAY OF LIPASE: CPT | Performed by: EMERGENCY MEDICINE

## 2025-08-07 PROCEDURE — 25010000002 KETOROLAC TROMETHAMINE PER 15 MG: Performed by: EMERGENCY MEDICINE

## 2025-08-07 PROCEDURE — 85025 COMPLETE CBC W/AUTO DIFF WBC: CPT | Performed by: EMERGENCY MEDICINE

## 2025-08-07 PROCEDURE — 80053 COMPREHEN METABOLIC PANEL: CPT | Performed by: EMERGENCY MEDICINE

## 2025-08-07 PROCEDURE — 83605 ASSAY OF LACTIC ACID: CPT | Performed by: EMERGENCY MEDICINE

## 2025-08-07 PROCEDURE — 74176 CT ABD & PELVIS W/O CONTRAST: CPT

## 2025-08-07 RX ORDER — PROCHLORPERAZINE EDISYLATE 5 MG/ML
10 INJECTION INTRAMUSCULAR; INTRAVENOUS ONCE
Status: COMPLETED | OUTPATIENT
Start: 2025-08-07 | End: 2025-08-07

## 2025-08-07 RX ORDER — KETOROLAC TROMETHAMINE 15 MG/ML
15 INJECTION, SOLUTION INTRAMUSCULAR; INTRAVENOUS ONCE
Status: COMPLETED | OUTPATIENT
Start: 2025-08-07 | End: 2025-08-07

## 2025-08-07 RX ORDER — SODIUM CHLORIDE 9 MG/ML
10 INJECTION, SOLUTION INTRAMUSCULAR; INTRAVENOUS; SUBCUTANEOUS AS NEEDED
Status: DISCONTINUED | OUTPATIENT
Start: 2025-08-07 | End: 2025-08-08 | Stop reason: HOSPADM

## 2025-08-07 RX ADMIN — PROCHLORPERAZINE EDISYLATE 10 MG: 5 INJECTION INTRAMUSCULAR; INTRAVENOUS at 22:40

## 2025-08-07 RX ADMIN — KETOROLAC TROMETHAMINE 15 MG: 15 INJECTION, SOLUTION INTRAMUSCULAR; INTRAVENOUS at 22:34

## 2025-08-08 ENCOUNTER — HOSPITAL ENCOUNTER (EMERGENCY)
Facility: HOSPITAL | Age: 40
Discharge: HOME | End: 2025-08-08
Attending: EMERGENCY MEDICINE
Payer: COMMERCIAL

## 2025-08-08 VITALS
HEART RATE: 71 BPM | SYSTOLIC BLOOD PRESSURE: 94 MMHG | HEIGHT: 64 IN | RESPIRATION RATE: 16 BRPM | DIASTOLIC BLOOD PRESSURE: 61 MMHG | OXYGEN SATURATION: 92 % | TEMPERATURE: 98.2 F | WEIGHT: 240.3 LBS | BODY MASS INDEX: 41.03 KG/M2

## 2025-08-08 DIAGNOSIS — N12 PYELONEPHRITIS: Primary | ICD-10-CM

## 2025-08-08 DIAGNOSIS — Z72.0 TOBACCO ABUSE: ICD-10-CM

## 2025-08-08 DIAGNOSIS — R10.9 ACUTE FLANK PAIN: ICD-10-CM

## 2025-08-08 PROCEDURE — 96365 THER/PROPH/DIAG IV INF INIT: CPT

## 2025-08-08 PROCEDURE — 25010000002 CEFTRIAXONE PER 250 MG: Performed by: EMERGENCY MEDICINE

## 2025-08-08 RX ORDER — CEPHALEXIN 500 MG/1
500 CAPSULE ORAL 2 TIMES DAILY
Qty: 14 CAPSULE | Refills: 0 | Status: SHIPPED | OUTPATIENT
Start: 2025-08-08 | End: 2025-08-15

## 2025-08-08 RX ADMIN — CEFTRIAXONE 2000 MG: 2 INJECTION, POWDER, FOR SOLUTION INTRAMUSCULAR; INTRAVENOUS at 00:47

## (undated) DEVICE — SYR LL TP 10ML STRL

## (undated) DEVICE — CVR FTSWITCH UNIV

## (undated) DEVICE — GW ZIPWIRE STD/SHFT STR JB/8CM .035X150CM

## (undated) DEVICE — FIBR LASR SOLTIVE BALL/TP 200MICRON 1P/U

## (undated) DEVICE — NITINOL STONE RETRIEVAL BASKET: Brand: ZERO TIP

## (undated) DEVICE — GLV SURG SENSICARE W/ALOE PF LF 7.5 STRL

## (undated) DEVICE — CATH URETRL FLXITP POLLACK STD 5F 70CM

## (undated) DEVICE — URETERAL ACCESS SHEATH SET: Brand: NAVIGATOR HD

## (undated) DEVICE — SYR LUERLOK 30CC

## (undated) DEVICE — PK CYSTO-TUR BASIC 10

## (undated) DEVICE — BLANKT WARM UPPR/BDY ARM/OUT 57X196CM